# Patient Record
Sex: FEMALE | Race: WHITE | NOT HISPANIC OR LATINO | Employment: OTHER | URBAN - METROPOLITAN AREA
[De-identification: names, ages, dates, MRNs, and addresses within clinical notes are randomized per-mention and may not be internally consistent; named-entity substitution may affect disease eponyms.]

---

## 2018-09-04 ENCOUNTER — HOSPITAL ENCOUNTER (EMERGENCY)
Facility: CLINIC | Age: 73
Discharge: HOME | End: 2018-09-05
Attending: EMERGENCY MEDICINE | Admitting: EMERGENCY MEDICINE
Payer: MEDICARE

## 2018-09-04 VITALS
TEMPERATURE: 98.2 F | DIASTOLIC BLOOD PRESSURE: 79 MMHG | SYSTOLIC BLOOD PRESSURE: 139 MMHG | BODY MASS INDEX: 39.65 KG/M2 | RESPIRATION RATE: 16 BRPM | WEIGHT: 210 LBS | HEART RATE: 84 BPM | HEIGHT: 61 IN | OXYGEN SATURATION: 94 %

## 2018-09-04 DIAGNOSIS — M54.41 ACUTE RIGHT-SIDED LOW BACK PAIN WITH RIGHT-SIDED SCIATICA: ICD-10-CM

## 2018-09-04 DIAGNOSIS — M25.551 HIP PAIN, RIGHT: ICD-10-CM

## 2018-09-04 PROCEDURE — A9270 NON-COVERED ITEM OR SERVICE: HCPCS | Mod: GY | Performed by: EMERGENCY MEDICINE

## 2018-09-04 PROCEDURE — 99283 EMERGENCY DEPT VISIT LOW MDM: CPT

## 2018-09-04 PROCEDURE — 25000132 ZZH RX MED GY IP 250 OP 250 PS 637: Mod: GY | Performed by: EMERGENCY MEDICINE

## 2018-09-04 RX ORDER — TRAMADOL HYDROCHLORIDE 50 MG/1
50 TABLET ORAL ONCE
Status: COMPLETED | OUTPATIENT
Start: 2018-09-04 | End: 2018-09-04

## 2018-09-04 RX ORDER — ACETAMINOPHEN 500 MG
1000 TABLET ORAL ONCE
Status: COMPLETED | OUTPATIENT
Start: 2018-09-04 | End: 2018-09-04

## 2018-09-04 RX ORDER — OXYCODONE HYDROCHLORIDE 5 MG/1
5 TABLET ORAL ONCE
Status: COMPLETED | OUTPATIENT
Start: 2018-09-04 | End: 2018-09-04

## 2018-09-04 RX ADMIN — TRAMADOL HYDROCHLORIDE 50 MG: 50 TABLET, COATED ORAL at 22:09

## 2018-09-04 RX ADMIN — ACETAMINOPHEN 1000 MG: 500 TABLET, FILM COATED ORAL at 22:09

## 2018-09-04 RX ADMIN — OXYCODONE HYDROCHLORIDE 5 MG: 5 TABLET ORAL at 22:58

## 2018-09-04 NOTE — ED AVS SNAPSHOT
Emergency Department    [address] [place] [address]    Phone:  [phone]    Fax:  [phone]                                       Coretta Kolb   MRN: [id number]    Department:   Emergency Department   Date of Visit:  9/4/2018           After Visit Summary Signature Page     I have received my discharge instructions, and my questions have been answered. I have discussed any challenges I see with this plan with the nurse or doctor.    ..........................................................................................................................................  Patient/Patient Representative Signature      ..........................................................................................................................................  Patient Representative Print Name and Relationship to Patient    ..................................................               ................................................  Date                                            Time    ..........................................................................................................................................  Reviewed by Signature/Title    ...................................................              ..............................................  Date                                                            Time          22EPIC Rev 08/18

## 2018-09-04 NOTE — ED AVS SNAPSHOT
Emergency Department    [address] [facility] [address]    Phone:  [phone]    Fax:  [phone]                                       Coretta Kolb   MRN: [id number]    Department:   Emergency Department   Date of Visit:  9/4/2018           Patient Information     Date Of Birth          1945        Your diagnoses for this visit were:     Hip pain, right     Acute right-sided low back pain with right-sided sciatica        You were seen by Hannah Patel MD.      Follow-up Information     Schedule an appointment as soon as possible for a visit with Myra Tesfaye DO.    Specialty:  Family Practice    Contact information:    [facility]  [address]  [facility] [address]  [phone]          Discharge Instructions       Try heat or ice to your back, activity as tolerated.  Tylenol and/or Roxicodone as needed for pain.  MiraLAX and a stool softener.  See your doctor in the clinic in the next 24-48 hours.  You may need an MRI scan.  If you get worse and cannot get up out of the chair or bed due to severe pain, return to the ER.    Opioid Medication Information  You have been given a prescription for an opioid (narcotic) pain medicine and/or have received a pain medicine while here in the emergency department. These medicines can make you drowsy or impaired. You must not drive, operate dangerous equipment, or engage in any other dangerous activities while taking these medications. If you drive while taking these medications, you could be arrested for DUI, or driving under the influence. Do not drink any alcohol while you are taking these medications.   Opioid pain medications can cause addiction. If you have a history of chemical dependency of any type, you are at a higher risk of becoming addicted to pain medications. Only take these prescribed medications to treat your pain when all other options have been tried. Take it for as short a time and as  few doses as possible. Store your pain pills in a secure place, as they are frequently stolen and provide a dangerous opportunity for children or visitors in your house to start abusing these powerful medications. We will not replace any lost or stolen medicine. As soon as your pain is better, you should flush all your remaining medication.   Many prescription pain medications contain Tylenol (acetaminophen), including Vicodin, Tylenol #3, Norco, Lortab, and Percocet. You should not take any extra pills of Tylenol if you are using these prescription medications or you can get very sick. Do not ever take more than 4000 mg of acetaminophen in any 24 hour period.  All opioids tend to cause constipation. Drink plenty of water and eat foods that have a lot of fiber, such as fruits, vegetables, prune juice, apple juice and high fiber cereal. Take a laxative if you don t move your bowels at least every other day. Miralax, Milk of Magnesia, Colace, or Senna can be used to keep you regular.       Discharge References/Attachments     BACK PAIN (ACUTE OR CHRONIC) (ENGLISH)      24 Hour Appointment Hotline       To make an appointment at any [facility] clinic, call [phone] ([phone]). If you don't have a family doctor or clinic, we will help you find one. [facility] clinics are conveniently located to serve the needs of you and your family.             Review of your medicines      START taking        Dose / Directions Last dose taken    oxyCODONE IR 5 MG tablet   Commonly known as:  ROXICODONE   Dose:  5-10 mg   Quantity:  14 tablet        Take 1-2 tablets (5-10 mg) by mouth every 6 hours as needed for pain   Refills:  0          Our records show that you are taking the medicines listed below. If these are incorrect, please call your family doctor or clinic.        Dose / Directions Last dose taken    artificial tears Oint ophthalmic ointment   Dose:  2 inch   Quantity:  1 Tube        Place 2 g into the right eye At  Bedtime   Refills:  1        aspirin 81 MG tablet   Dose:  81 mg        Take 81 mg by mouth daily   Refills:  0        ATENOLOL PO        Refills:  0        HUMALOG KWIKPEN SC        Refills:  0        insulin glargine 100 UNIT/ML injection   Commonly known as:  LANTUS        Inject Subcutaneous At Bedtime   Refills:  0        LEVOTHYROXINE SODIUM PO        Refills:  0        LISINOPRIL PO        Refills:  0        valACYclovir 1000 mg tablet   Commonly known as:  VALTREX   Dose:  1000 mg   Quantity:  21 tablet        Take 1 tablet (1,000 mg) by mouth 3 times daily   Refills:  0                Information about OPIOIDS     PRESCRIPTION OPIOIDS: WHAT YOU NEED TO KNOW   We gave you an opioid (narcotic) pain medicine. It is important to manage your pain, but opioids are not always the best choice. You should first try all the other options your care team gave you. Take this medicine for as short a time (and as few doses) as possible.    Some activities can increase your pain, such as bandage changes or therapy sessions. It may help to take your pain medicine 30 to 60 minutes before these activities. Reduce your stress by getting enough sleep, working on hobbies you enjoy and practicing relaxation or meditation. Talk to your care team about ways to manage your pain beyond prescription opioids.    These medicines have risks:    DO NOT drive when on new or higher doses of pain medicine. These medicines can affect your alertness and reaction times, and you could be arrested for driving under the influence (DUI). If you need to use opioids long-term, talk to your care team about driving.    DO NOT operate heavy machinery    DO NOT do any other dangerous activities while taking these medicines.    DO NOT drink any alcohol while taking these medicines.     If the opioid prescribed includes acetaminophen, DO NOT take with any other medicines that contain acetaminophen. Read all labels carefully. Look for the word   acetaminophen  or  Tylenol.  Ask your pharmacist if you have questions or are unsure.    You can get addicted to pain medicines, especially if you have a history of addiction (chemical, alcohol or substance dependence). Talk to your care team about ways to reduce this risk.    All opioids tend to cause constipation. Drink plenty of water and eat foods that have a lot of fiber, such as fruits, vegetables, prune juice, apple juice and high-fiber cereal. Take a laxative (Miralax, milk of magnesia, Colace, Senna) if you don t move your bowels at least every other day. Other side effects include upset stomach, sleepiness, dizziness, throwing up, tolerance (needing more of the medicine to have the same effect), physical dependence and slowed breathing.    Store your pills in a secure place, locked if possible. We will not replace any lost or stolen medicine. If you don t finish your medicine, please throw away (dispose) as directed by your pharmacist. The [place] [employer] has more information about safe disposal: [email or web address]://[email or web address].[email or web address].mn.us/living-green/managing-unwanted-medications        Prescriptions were sent or printed at these locations (1 Prescription)                   Other Prescriptions                Printed at Department/Unit printer (1 of 1)         oxyCODONE IR (ROXICODONE) 5 MG tablet                Orders Needing Specimen Collection     None      Pending Results     No orders found for last 3 day(s).            Pending Culture Results     No orders found for last 3 day(s).            Pending Results Instructions     If you had any lab results that were not finalized at the time of your Discharge, you can call the ED Lab Result RN at [phone]. You will be contacted by this team for any positive Lab results or changes in treatment. The nurses are available 7 days a week from 10A to 6:30P.  You can leave a message 24 hours per day and they will return your call.        Test Results From  Your Hospital Stay               Clinical Quality Measure: Blood Pressure Screening     Your blood pressure was checked while you were in the emergency department today. The last reading we obtained was  BP: 139/79 . Please read the guidelines below about what these numbers mean and what you should do about them.  If your systolic blood pressure (the top number) is less than 120 and your diastolic blood pressure (the bottom number) is less than 80, then your blood pressure is normal. There is nothing more that you need to do about it.  If your systolic blood pressure (the top number) is 120-139 or your diastolic blood pressure (the bottom number) is 80-89, your blood pressure may be higher than it should be. You should have your blood pressure rechecked within a year by a primary care provider.  If your systolic blood pressure (the top number) is 140 or greater or your diastolic blood pressure (the bottom number) is 90 or greater, you may have high blood pressure. High blood pressure is treatable, but if left untreated over time it can put you at risk for heart attack, stroke, or kidney failure. You should have your blood pressure rechecked by a primary care provider within the next 4 weeks.  If your provider in the emergency department today gave you specific instructions to follow-up with your doctor or provider even sooner than that, you should follow that instruction and not wait for up to 4 weeks for your follow-up visit.        Thank you for choosing [facility]       Thank you for choosing [facility] for your care. Our goal is always to provide you with excellent care. Hearing back from our patients is one way we can continue to improve our services. Please take a few minutes to complete the written survey that you may receive in the mail after you visit with us. Thank you!        [employer] Information     [employer] lets you send messages to your doctor, view your test results, renew your prescriptions, schedule  "appointments and more. To sign up, go to [email or web address] . Click on \"Log in\" on the left side of the screen, which will take you to the Welcome page. Then click on \"Sign up Now\" on the right side of the page.     You will be asked to enter the access code listed below, as well as some personal information. Please follow the directions to create your username and password.     Your access code is: [email or web address]  Expires: 2018 12:35 AM     Your access code will  in 90 days. If you need help or a new code, please call your [facility] clinic or [phone].        Care EveryWhere ID     This is your Care EveryWhere ID. This could be used by other organizations to access your [facility] medical records  [id number]        Equal Access to Services     KETTY GABRIEL : Sienna Gamino, farshad sanabria, florencio valles, becca myers . So [facility] [phone].    ATENCIÓN: Si habla español, tiene a laura disposición servicios gratuitos de asistencia lingüística. Llame al [phone].    We comply with applicable federal civil rights laws and [place] laws. We do not discriminate on the basis of race, color, national origin, age, disability, sex, sexual orientation, or gender identity.            After Visit Summary       This is your record. Keep this with you and show to your community pharmacist(s) and doctor(s) at your next visit.                  "

## 2018-09-05 RX ORDER — OXYCODONE HYDROCHLORIDE 5 MG/1
5-10 TABLET ORAL EVERY 6 HOURS PRN
Qty: 14 TABLET | Refills: 0 | Status: SHIPPED | OUTPATIENT
Start: 2018-09-05 | End: 2023-01-09

## 2018-09-05 ASSESSMENT — ENCOUNTER SYMPTOMS
ARTHRALGIAS: 1
MYALGIAS: 1
BACK PAIN: 1

## 2018-09-05 NOTE — ED PROVIDER NOTES
"  History     Chief Complaint:  Back Pain      HPI   Coretta Kolb is a 72 year old female, with a history of diabetes, neuropathy, hypertension, and back surgery, who presents with back pain. Patient has bad some discomfort in her hip and thighs bilaterally since her surgery, however, three weeks ago it progressively worsened. She reports being able to do her chores around the house and walking up and down stairs, but the movement causes pain. She denies any throbbing pain at night. Patient has neuropathy so she notes numbness in her toes. Normal urination and bowel movements. Patient has iced the area, but has not taken any medications for her pain. She reports  Cortisone shots and physical therapy have not helped. Her surgeon is Dr. Gutierrez and her family physician is Dr. Myra Tesfaye.             Allergies:  Hydrocodone-acetaminophen      Medications:    Artifical tears  Aspirin  Atenolol  Insulin glargine  Insulin lispro  Levothyroxine  Lisinopril  Valtrex     Past Medical History:    Diabetes  Hypertension  Hypothyroid     Past Surgical History:    Orthopedic surgery     Family History:    The patient denies any relevant family medical history.    Social History:  The patient was accompanied to the ED by daughter.  Smoking Status: No  Smokeless Tobacco: Not on file  Alcohol Use: Yes  Marital Status:  Single [1]      Review of Systems   Musculoskeletal: Positive for arthralgias, back pain and myalgias.   All other systems reviewed and are negative.    Physical Exam   Vitals:  Patient Vitals for the past 24 hrs:   BP Temp Temp src Pulse Resp SpO2 Height Weight   09/04/18 2208 139/79 - - - - - - -   09/04/18 2052 182/83 98.2  F (36.8  C) Oral 84 16 94 % 1.556 m (5' 1.25\") 95.3 kg (210 lb)         Physical Exam  Nursing note and vitals reviewed.    Constitutional:  Appears well-developed and well-nourished, comfortable at rest but grimaces    with movement.    Cardiovascular:  Normal rate, regular " rhythm.     Peripheral pulse 2+ in right foot.  Cap refill less than 2 seconds.  Musculoskeletal:  Range of motion as directed with her back due to the pain on the right side. No    extremity deformity.     Tenderness over the right SI joint and lateral hip and thigh and over the proximal    iliotibial band.  Minimal midline lumbar tenderness.  Neurological:   Alert and oriented. Exhibits good muscle tone.  Normal ankle flexion and extension.     Sensation in the affected extremity normal.     GCS eye subscore is 4. GCS verbal subscore is 5.      GCS motor subscore is 6.   Skin:    Skin is warm and dry. No rash noted. No bruising.     No erythema. No pallor.  No lesions.   Psychiatric:   Behavior is normal. Appropriate mood and affect.     Judgment and thought content normal.   Emergency Department Course     Interventions:  2209 Tylenol 1000 mg Oral  2209 Tramadol 50 mg Oral  2258 Oxycodone IR 5 mg Oral       Emergency Department Course:  Nursing notes and vitals reviewed.  I performed an exam of the patient as documented above.     1722 check in with the patient.     I personally answered all related questions prior to discharge.    Findings and plan explained to the patient. Patient discharged home with instructions regarding supportive care, medications, and reasons to return. The importance of close follow-up was reviewed.     Impression & Plan      Medical Decision Making:  Patient comes in with right low back and hip pain. It feels just like her previous one where she required surgery of her lumbar spine due to disc. She has some SI joint tenderness but also lateral hip and thigh tenderness. I believe that she probably does have a disc causing some sciatica. She was given initially a tramadol which was not adequate for pain management and then a Roxicodone 5 mg tablet.  She was able to get up and move around but the getting up and down was what was painful.  She does not need imaging tonight, she does not  have a history of trauma. She may need an MRI to determine if this is another disc issue that may need surgery. She has had steroid injections and been on steroids in the past despite being a diabetic and they have not helped. I am going to send her home with a prescription for oxycodone with the precautions and she will follow-up with her doctor in the clinic in the next 1-2 days.  If she gets acutely worse where she cannot even get out of a chair, she can return to the ER.      Diagnosis:    ICD-10-CM    1. Hip pain, right M25.551    2. Acute right-sided low back pain with right-sided sciatica M54.41        Disposition:   Discharged    Discharge instructions:  Try heat or ice to your back, activity as tolerated. Tylenol and/or Roxicodone as needed for pain. MiraLAX and a stool softener. See your doctor in the clinic in the next 24-48 hours. You may need an MRI scan.  If you get worse and cannot get up out of the chair or bed due to severe pain, return to the ER.    CMS Diagnoses: None     Discharge Medications:  New Prescriptions    OXYCODONE IR (ROXICODONE) 5 MG TABLET    Take 1-2 tablets (5-10 mg) by mouth every 6 hours as needed for pain     Scribe Disclosure:  I, Karla Canseco, am serving as a scribe at 9:54 PM on 9/4/2018 to document services personally performed by Hannah Patel MD, based on my observations and the provider's statements to me.   EMERGENCY DEPARTMENT       Hannah Patel MD  09/05/18 0109

## 2018-09-05 NOTE — ED NOTES
Road test: Pt successfully ambulated without assistance, however 10/10 pain when going to a sitting position. MD notified.

## 2018-09-05 NOTE — DISCHARGE INSTRUCTIONS
Try heat or ice to your back, activity as tolerated.  Tylenol and/or Roxicodone as needed for pain.  MiraLAX and a stool softener.  See your doctor in the clinic in the next 24-48 hours.  You may need an MRI scan.  If you get worse and cannot get up out of the chair or bed due to severe pain, return to the ER.    Opioid Medication Information  You have been given a prescription for an opioid (narcotic) pain medicine and/or have received a pain medicine while here in the emergency department. These medicines can make you drowsy or impaired. You must not drive, operate dangerous equipment, or engage in any other dangerous activities while taking these medications. If you drive while taking these medications, you could be arrested for DUI, or driving under the influence. Do not drink any alcohol while you are taking these medications.   Opioid pain medications can cause addiction. If you have a history of chemical dependency of any type, you are at a higher risk of becoming addicted to pain medications. Only take these prescribed medications to treat your pain when all other options have been tried. Take it for as short a time and as few doses as possible. Store your pain pills in a secure place, as they are frequently stolen and provide a dangerous opportunity for children or visitors in your house to start abusing these powerful medications. We will not replace any lost or stolen medicine. As soon as your pain is better, you should flush all your remaining medication.   Many prescription pain medications contain Tylenol (acetaminophen), including Vicodin, Tylenol #3, Norco, Lortab, and Percocet. You should not take any extra pills of Tylenol if you are using these prescription medications or you can get very sick. Do not ever take more than 4000 mg of acetaminophen in any 24 hour period.  All opioids tend to cause constipation. Drink plenty of water and eat foods that have a lot of fiber, such as fruits, vegetables,  prune juice, apple juice and high fiber cereal. Take a laxative if you don t move your bowels at least every other day. Miralax, Milk of Magnesia, Colace, or Senna can be used to keep you regular.

## 2019-05-09 ENCOUNTER — HOSPITAL ENCOUNTER (EMERGENCY)
Facility: CLINIC | Age: 74
Discharge: HOME | End: 2019-05-09
Attending: EMERGENCY MEDICINE | Admitting: EMERGENCY MEDICINE
Payer: MEDICARE

## 2019-05-09 VITALS
SYSTOLIC BLOOD PRESSURE: 172 MMHG | OXYGEN SATURATION: 96 % | DIASTOLIC BLOOD PRESSURE: 72 MMHG | RESPIRATION RATE: 16 BRPM | WEIGHT: 215 LBS | HEART RATE: 84 BPM | TEMPERATURE: 98.7 F | BODY MASS INDEX: 40.59 KG/M2 | HEIGHT: 61 IN

## 2019-05-09 DIAGNOSIS — M79.651 PAIN OF RIGHT THIGH: ICD-10-CM

## 2019-05-09 PROCEDURE — 99283 EMERGENCY DEPT VISIT LOW MDM: CPT

## 2019-05-09 PROCEDURE — A9270 NON-COVERED ITEM OR SERVICE: HCPCS | Performed by: EMERGENCY MEDICINE

## 2019-05-09 PROCEDURE — 25000132 ZZH RX MED GY IP 250 OP 250 PS 637: Performed by: EMERGENCY MEDICINE

## 2019-05-09 RX ORDER — OXYCODONE AND ACETAMINOPHEN 5; 325 MG/1; MG/1
1 TABLET ORAL ONCE
Status: COMPLETED | OUTPATIENT
Start: 2019-05-09 | End: 2019-05-09

## 2019-05-09 RX ORDER — LIDOCAINE 4 G/G
2 PATCH TOPICAL
Status: DISCONTINUED | OUTPATIENT
Start: 2019-05-09 | End: 2019-05-10 | Stop reason: HOSPADM

## 2019-05-09 RX ORDER — LIDOCAINE 50 MG/G
1 PATCH TOPICAL EVERY 24 HOURS
Qty: 10 PATCH | Refills: 0 | Status: SHIPPED | OUTPATIENT
Start: 2019-05-09 | End: 2019-05-19

## 2019-05-09 RX ADMIN — OXYCODONE HYDROCHLORIDE AND ACETAMINOPHEN 1 TABLET: 5; 325 TABLET ORAL at 22:53

## 2019-05-09 RX ADMIN — LIDOCAINE 2 PATCH: 560 PATCH PERCUTANEOUS; TOPICAL; TRANSDERMAL at 22:53

## 2019-05-09 ASSESSMENT — ENCOUNTER SYMPTOMS
CHILLS: 0
NUMBNESS: 0
WOUND: 0
ARTHRALGIAS: 0
COLOR CHANGE: 0
WEAKNESS: 0
FEVER: 0

## 2019-05-09 ASSESSMENT — MIFFLIN-ST. JEOR: SCORE: 1427.61

## 2019-05-09 NOTE — ED AVS SNAPSHOT
Emergency Department  [address] [place] [address]  Phone:  [phone]  Fax:  [phone]                                    Coretta Kolb   MRN: [id number]    Department:   Emergency Department   Date of Visit:  5/9/2019           After Visit Summary Signature Page    I have received my discharge instructions, and my questions have been answered. I have discussed any challenges I see with this plan with the nurse or doctor.    ..........................................................................................................................................  Patient/Patient Representative Signature      ..........................................................................................................................................  Patient Representative Print Name and Relationship to Patient    ..................................................               ................................................  Date                                   Time    ..........................................................................................................................................  Reviewed by Signature/Title    ...................................................              ..............................................  Date                                               Time          22EPIC Rev 08/18

## 2019-05-10 NOTE — ED PROVIDER NOTES
History     Chief Complaint:  Leg Pain    HPI   Coretta Kolb is a 73 year old female with a history of insulin-dependent diabetes with diabetic neuropathy as well as right leg bursitis diagnosed in September 2018 who presents with right leg pain. Since September 2018, the patient reports she has been dealing with some right upper leg pain secondary to bursitis for which she received injection with good results in December 2018 and follows with Dr. Cardoso at [facility]. She had a second injection a few weeks ago without any relief and has been attending physical therapy since then. This morning, the patient reports she attended physical therapy and noticed the pain in her right leg seemed particularly bad afterwards, preventing her from walking without assistance, so she had her daughter call [facility] who recommended she come to the ED for further evaluation. Here, the patient states her pain is not different than normal, but rather she is just sick of it and has had enough. She notes she has been intermittently using ice at home, but otherwise does not take any over-the-counter pain medications at home. She describes the pain as a tightness. She denies any numbness, tingling, weakness, recent trauma, knee or joint pain, redness/warmth, fevers, chills, leg swelling, or other acute symptoms or changes.    Allergies:  Hydrocodone-acetaminophen  Metformin  Pioglitazone    Medications:    Meloxicam  Amlodipine  Crestor  Lisinopril  Levothyroxine  Cymbalta  Gabapentin  Novolin    Past Medical History:    Corneal dystrophy  Hypertension  Sensorineural hearing loss  Dyslipidemia  Type II diabetes mellitus  Painful diabetic neuropathy  Hypothyroidism  Plantar fascial fibromatosis    Past Surgical History:    Right foot surgery x4  Left knee surgery  Ligation fallopian tube  Left foot surgery  Dilation and curettage  Herniated disc/spinal stenosis surgery    Family History:    Cataracts    Social History:  Smoking status:  "No  Alcohol use: Yes, occasionally  Drug use: No  PCP: Myra Tesfaye DO  Presents to the ED with her daughter  Marital Status: Single [1]     Review of Systems   Constitutional: Negative for chills and fever.   Cardiovascular: Negative for leg swelling.   Musculoskeletal: Negative for arthralgias.        Right lower extremity pain   Skin: Negative for color change and wound.   Neurological: Negative for weakness and numbness.   10 point review of systems performed and is negative except as above and in HPI.    Physical Exam     Patient Vitals for the past 24 hrs:   BP Temp Temp src Pulse Resp SpO2 Height Weight   05/09/19 2033 172/72 98.7  F (37.1  C) Oral 84 16 96 % 1.549 m (5' 1\") 97.5 kg (215 lb)     Physical Exam  General: No distress.   Head: No signs of trauma.   Mouth/Throat: Oropharynx moist.   Eyes: Conjunctivae are normal. Pupils are equal..   Neck: Normal range of motion.    Resp:No respiratory distress.   MSK: Normal range of motion. No obvious deformity. Right hip is non-tender. Right lateral thigh is mildly tender to palpation. No saddle paresthesias. No tenderness in the posterior leg. No tenderness to the calf. No swelling. She is able to bear weight on the right lower extremity.  Neuro: The patient is alert and interactive. TEJEDA. Speech normal. GCS 15  Skin: No lesion or sign of trauma noted.   Psych: normal mood and affect. behavior is normal.     Emergency Department Course   Interventions:  2253: 4% Lidocaine patches applied to patient's right leg  2253: 1 tablet Percocet PO    Emergency Department Course:  Past medical records, nursing notes, and vitals reviewed.  2212: I performed an exam of the patient and obtained history, as documented above.    2303: I rechecked the patient. Findings and plan explained to the patient. Patient discharged home with instructions regarding supportive care, medications, and reasons to return. The importance of close follow-up was reviewed.     Impression & Plan "    Medical Decision Making:  Coretta Kolb is a 73 year old female who presents for evaluation of chronic pain to her right thigh, previously diagnosed as bursitis. The patient follows with orthopedic surgery and is currently undergoing physical therapy. She attended PT this morning and thinks that may have exacerbated her pain, prompting her ED visit this evening. Here, she describes pain consistent with her chronic pain of this area. She has never tried a Lidocaine patch in the past, so we applied these in the ED. I also gave her one dose of Percocet to help alleviate her pain temporarily. At this time, there are no signs of complication of the long-standing bursitis including recent fevers, color changes, numbness, or weakness. There is no indication for imaging as the patient has undergone extensive imaging in the past. The patient will continue to follow-up with orthopedics. In the meantime, I will write her a prescription for Lidocaine patches. She was understanding and agreeable to the plan for discharge home. She was discharged home in stable, improved condition.    Diagnosis:    ICD-10-CM   1. Pain of right thigh M79.651     Disposition: Discharged to home    Discharge Medications:  lidocaine 5 % patch  Commonly known as:  LIDODERM  1 patch, Transdermal, EVERY 24 HOURS       Dorothy Donovan  5/9/2019    EMERGENCY DEPARTMENT    I, Dorothy Donovan, am serving as a scribe at 10:12 PM on 5/9/2019 to document services personally performed by Ines Gordon MD based on my observations and the provider's statements to me.      Ines Gordon MD  05/11/19 0151

## 2019-05-10 NOTE — ED TRIAGE NOTES
Rt leg pain since last fall. Has seen an MD at [facility], has been getting PT, had PT today. Rt side of leg has increased in pain today.

## 2022-11-30 ENCOUNTER — HOSPITAL ENCOUNTER (EMERGENCY)
Facility: CLINIC | Age: 77
Discharge: HOME | End: 2022-11-30
Attending: EMERGENCY MEDICINE | Admitting: EMERGENCY MEDICINE
Payer: MEDICARE

## 2022-11-30 VITALS
HEIGHT: 61 IN | SYSTOLIC BLOOD PRESSURE: 153 MMHG | OXYGEN SATURATION: 96 % | HEART RATE: 77 BPM | TEMPERATURE: 98.1 F | DIASTOLIC BLOOD PRESSURE: 88 MMHG | RESPIRATION RATE: 18 BRPM | BODY MASS INDEX: 41.54 KG/M2 | WEIGHT: 220 LBS

## 2022-11-30 DIAGNOSIS — R73.9 ELEVATED BLOOD SUGAR: ICD-10-CM

## 2022-11-30 LAB
ALBUMIN SERPL-MCNC: 3 G/DL (ref 3.4–5)
ALBUMIN UR-MCNC: 100 MG/DL
ALP SERPL-CCNC: 114 U/L (ref 40–150)
ALT SERPL W P-5'-P-CCNC: 27 U/L (ref 0–50)
ANION GAP SERPL CALCULATED.3IONS-SCNC: 9 MMOL/L (ref 3–14)
APPEARANCE UR: CLEAR
AST SERPL W P-5'-P-CCNC: 35 U/L (ref 0–45)
ATRIAL RATE - MUSE: NORMAL BPM
BASE EXCESS BLDV CALC-SCNC: 2.7 MMOL/L (ref -7.7–1.9)
BASOPHILS # BLD AUTO: 0.1 10E3/UL (ref 0–0.2)
BASOPHILS NFR BLD AUTO: 1 %
BILIRUB SERPL-MCNC: 0.5 MG/DL (ref 0.2–1.3)
BILIRUB UR QL STRIP: NEGATIVE
BUN SERPL-MCNC: 18 MG/DL (ref 7–30)
CALCIUM SERPL-MCNC: 9.8 MG/DL (ref 8.5–10.1)
CHLORIDE BLD-SCNC: 103 MMOL/L (ref 94–109)
CO2 SERPL-SCNC: 23 MMOL/L (ref 20–32)
COLOR UR AUTO: ABNORMAL
CREAT SERPL-MCNC: 0.67 MG/DL (ref 0.52–1.04)
DIASTOLIC BLOOD PRESSURE - MUSE: NORMAL MMHG
EOSINOPHIL # BLD AUTO: 0.5 10E3/UL (ref 0–0.7)
EOSINOPHIL NFR BLD AUTO: 7 %
ERYTHROCYTE [DISTWIDTH] IN BLOOD BY AUTOMATED COUNT: 13.5 % (ref 10–15)
GFR SERPL CREATININE-BSD FRML MDRD: >90 ML/MIN/1.73M2
GLUCOSE BLD-MCNC: 432 MG/DL (ref 70–99)
GLUCOSE BLDC GLUCOMTR-MCNC: 382 MG/DL (ref 70–99)
GLUCOSE UR STRIP-MCNC: >=1000 MG/DL
HBA1C MFR BLD: 9.8 % (ref 0–5.6)
HCO3 BLDV-SCNC: 27 MMOL/L (ref 21–28)
HCT VFR BLD AUTO: 46.3 % (ref 35–47)
HGB BLD-MCNC: 15.2 G/DL (ref 11.7–15.7)
HGB UR QL STRIP: ABNORMAL
IMM GRANULOCYTES # BLD: 0 10E3/UL
IMM GRANULOCYTES NFR BLD: 0 %
INTERPRETATION ECG - MUSE: NORMAL
KETONES BLD-SCNC: 0.3 MMOL/L (ref 0–0.6)
KETONES UR STRIP-MCNC: NEGATIVE MG/DL
LEUKOCYTE ESTERASE UR QL STRIP: NEGATIVE
LYMPHOCYTES # BLD AUTO: 1.8 10E3/UL (ref 0.8–5.3)
LYMPHOCYTES NFR BLD AUTO: 22 %
MCH RBC QN AUTO: 29.3 PG (ref 26.5–33)
MCHC RBC AUTO-ENTMCNC: 32.8 G/DL (ref 31.5–36.5)
MCV RBC AUTO: 89 FL (ref 78–100)
MONOCYTES # BLD AUTO: 0.7 10E3/UL (ref 0–1.3)
MONOCYTES NFR BLD AUTO: 8 %
MUCOUS THREADS #/AREA URNS LPF: PRESENT /LPF
NEUTROPHILS # BLD AUTO: 5.2 10E3/UL (ref 1.6–8.3)
NEUTROPHILS NFR BLD AUTO: 62 %
NITRATE UR QL: NEGATIVE
NRBC # BLD AUTO: 0 10E3/UL
NRBC BLD AUTO-RTO: 0 /100
O2/TOTAL GAS SETTING VFR VENT: 0 %
OXYHGB MFR BLDV: 84 % (ref 70–75)
P AXIS - MUSE: NORMAL DEGREES
PCO2 BLDV: 41 MM HG (ref 40–50)
PH BLDV: 7.43 [PH] (ref 7.32–7.43)
PH UR STRIP: 6 [PH] (ref 5–7)
PLAT MORPH BLD: NORMAL
PLATELET # BLD AUTO: 222 10E3/UL (ref 150–450)
PO2 BLDV: 48 MM HG (ref 25–47)
POTASSIUM BLD-SCNC: 4.6 MMOL/L (ref 3.4–5.3)
PR INTERVAL - MUSE: NORMAL MS
PROT SERPL-MCNC: 7.5 G/DL (ref 6.8–8.8)
QRS DURATION - MUSE: 86 MS
QT - MUSE: 380 MS
QTC - MUSE: 416 MS
R AXIS - MUSE: -24 DEGREES
RBC # BLD AUTO: 5.19 10E6/UL (ref 3.8–5.2)
RBC MORPH BLD: NORMAL
RBC URINE: 2 /HPF
SODIUM SERPL-SCNC: 135 MMOL/L (ref 133–144)
SP GR UR STRIP: 1.03 (ref 1–1.03)
SQUAMOUS EPITHELIAL: <1 /HPF
SYSTOLIC BLOOD PRESSURE - MUSE: NORMAL MMHG
T AXIS - MUSE: 50 DEGREES
TROPONIN I SERPL HS-MCNC: 9 NG/L
UROBILINOGEN UR STRIP-MCNC: NORMAL MG/DL
VENTRICULAR RATE- MUSE: 72 BPM
WBC # BLD AUTO: 8.3 10E3/UL (ref 4–11)
WBC URINE: 1 /HPF

## 2022-11-30 PROCEDURE — 96360 HYDRATION IV INFUSION INIT: CPT

## 2022-11-30 PROCEDURE — 81001 URINALYSIS AUTO W/SCOPE: CPT | Performed by: EMERGENCY MEDICINE

## 2022-11-30 PROCEDURE — 99284 EMERGENCY DEPT VISIT MOD MDM: CPT | Mod: 25

## 2022-11-30 PROCEDURE — 84484 ASSAY OF TROPONIN QUANT: CPT | Performed by: EMERGENCY MEDICINE

## 2022-11-30 PROCEDURE — 82374 ASSAY BLOOD CARBON DIOXIDE: CPT | Performed by: EMERGENCY MEDICINE

## 2022-11-30 PROCEDURE — 82435 ASSAY OF BLOOD CHLORIDE: CPT | Performed by: EMERGENCY MEDICINE

## 2022-11-30 PROCEDURE — 85025 COMPLETE CBC W/AUTO DIFF WBC: CPT | Performed by: EMERGENCY MEDICINE

## 2022-11-30 PROCEDURE — 36415 COLL VENOUS BLD VENIPUNCTURE: CPT | Performed by: EMERGENCY MEDICINE

## 2022-11-30 PROCEDURE — 83036 HEMOGLOBIN GLYCOSYLATED A1C: CPT | Performed by: EMERGENCY MEDICINE

## 2022-11-30 PROCEDURE — 82805 BLOOD GASES W/O2 SATURATION: CPT | Performed by: EMERGENCY MEDICINE

## 2022-11-30 PROCEDURE — 258N000003 HC RX IP 258 OP 636: Performed by: EMERGENCY MEDICINE

## 2022-11-30 PROCEDURE — 93005 ELECTROCARDIOGRAM TRACING: CPT

## 2022-11-30 PROCEDURE — 82010 KETONE BODYS QUAN: CPT | Performed by: EMERGENCY MEDICINE

## 2022-11-30 RX ADMIN — SODIUM CHLORIDE 1000 ML: 9 INJECTION, SOLUTION INTRAVENOUS at 10:13

## 2022-11-30 ASSESSMENT — ACTIVITIES OF DAILY LIVING (ADL): ADLS_ACUITY_SCORE: 35

## 2022-11-30 ASSESSMENT — ENCOUNTER SYMPTOMS
DYSURIA: 0
ABDOMINAL PAIN: 0
HEMATURIA: 0

## 2022-11-30 NOTE — ED PROVIDER NOTES
"  History     Chief Complaint:  Hyperglycemia      HPI   Coretta Kolb is a 77 year old female who has a history of DM type II and presents with hyperglycemia. Patient was supposed to have cataract surgery but was instead sent to the ED for high blood sugar. She states that she has not taken her lantus or humalog since Sunday, and when asked why she states that it \"was a mistake.\" She has enough medication at home. She denies chest pain, abdominal pain, or urinary problems. Her second cataract surgery is scheduled in December.    Review of Systems   Cardiovascular: Negative for chest pain.   Gastrointestinal: Negative for abdominal pain.   Genitourinary: Negative for decreased urine volume, dysuria, hematuria and urgency.   All other systems reviewed and are negative.    Allergies:  Hydrocodone-Acetaminophen      Medications:    aspirin   atenolol  insulin glargine  Insulin Lispro  levothyroxine  lisinopril  oxycodone IR  valacyclovir     Past Medical History:    Diabetes mellitus type II  Hypertension  Hypothyroidism  Basal cell carcinoma  Cataracts  Osteoarthritis  Lichen sclerosus  JESUS    Past Surgical History:    Orthopedic surgery    Social History:  Presents with daughter  Physical Exam     Patient Vitals for the past 24 hrs:   BP Temp Temp src Pulse Resp SpO2 Height Weight   11/30/22 0945 (!) 153/88 -- -- -- -- -- -- --   11/30/22 0914 (!) 209/96 98.1  F (36.7  C) Temporal 77 18 96 % 1.549 m (5' 1\") 99.8 kg (220 lb)       Physical Exam  Vitals: reviewed by me  General: Pt seen on \[facility] [place] [facility]\"", [facility], cooperative, and alert to conversation  Eyes: Tracking well, clear conjunctiva BL  ENT: MMM, midline trachea.   Lungs: No tachypnea, no accessory muscle use. No respiratory distress.   CV: Rate as above  Abd: Soft, non tender, no guarding, no rebound. Non distended  MSK: no joint effusion.  No evidence of trauma  Skin: No rash  Neuro: Clear speech and no facial droop.  Psych: Not RIS, no e/o " AH/VH      Emergency Department Course   ECG:  ECG taken at 0942  ECG results from 11/30/22   EKG 12-lead, tracing only     Value    Systolic Blood Pressure     Diastolic Blood Pressure     Ventricular Rate 72    Atrial Rate     RI Interval     QRS Duration 86        QTc 416    P Axis     R AXIS -24    T Axis 50    Interpretation ECG      Accelerated Junctional rhythm  Low voltage QRS  Cannot rule out Anterior infarct , age undetermined  Abnormal ECG  When compared with ECG of 27-OCT-2014 22:10,  Junctional rhythm has replaced Sinus rhythm  Minimal criteria for Anterior infarct are now Present  ST now depressed in Lateral leads       Laboratory:  Labs Ordered and Resulted from Time of ED Arrival to Time of ED Departure   COMPREHENSIVE METABOLIC PANEL - Abnormal       Result Value    Sodium 135      Potassium 4.6      Chloride 103      Carbon Dioxide (CO2) 23      Anion Gap 9      Urea Nitrogen 18      Creatinine 0.67      Calcium 9.8      Glucose 432 (*)     Alkaline Phosphatase 114      AST 35      ALT 27      Protein Total 7.5      Albumin 3.0 (*)     Bilirubin Total 0.5      GFR Estimate >90     BLOOD GAS VENOUS WITH OXYHEMOGLOBIN - Abnormal    pH Venous 7.43      pCO2 Venous 41      pO2 Venous 48 (*)     Bicarbonate Venous 27      FIO2 0      Oxyhemoglobin Venous 84 (*)     Base Excess/Deficit (+/-) 2.7 (*)    ROUTINE UA WITH MICROSCOPIC REFLEX TO CULTURE - Abnormal    Color Urine Light Yellow      Appearance Urine Clear      Glucose Urine >=1000 (*)     Bilirubin Urine Negative      Ketones Urine Negative      Specific Gravity Urine 1.030      Blood Urine Trace (*)     pH Urine 6.0      Protein Albumin Urine 100 (*)     Urobilinogen Urine Normal      Nitrite Urine Negative      Leukocyte Esterase Urine Negative      Mucus Urine Present (*)     RBC Urine 2      WBC Urine 1      Squamous Epithelials Urine <1     HEMOGLOBIN A1C - Abnormal    Hemoglobin A1C 9.8 (*)    KETONE BETA-HYDROXYBUTYRATE QUANTITATIVE,  RAPID - Normal    Ketone (Beta-Hydroxybutyrate) Quantitative 0.3     TROPONIN I - Normal    Troponin I High Sensitivity 9     CBC WITH PLATELETS AND DIFFERENTIAL    WBC Count 8.3      RBC Count 5.19      Hemoglobin 15.2      Hematocrit 46.3      MCV 89      MCH 29.3      MCHC 32.8      RDW 13.5      Platelet Count 222      % Neutrophils 62      % Lymphocytes 22      % Monocytes 8      % Eosinophils 7      % Basophils 1      % Immature Granulocytes 0      NRBCs per 100 WBC 0      Absolute Neutrophils 5.2      Absolute Lymphocytes 1.8      Absolute Monocytes 0.7      Absolute Eosinophils 0.5      Absolute Basophils 0.1      Absolute Immature Granulocytes 0.0      Absolute NRBCs 0.0     RBC AND PLATELET MORPHOLOGY    Platelet Assessment        Value: Automated Count Confirmed. Platelet morphology is normal.    RBC Morphology Confirmed RBC Indices       Emergency Department Course:    Reviewed:  I reviewed nursing notes, vitals, past medical history and Care Everywhere    Assessments:  0920 I obtained history and examined the patient as noted above.   1120 I rechecked the patient and explained findings.     Interventions:  1013 NS 1000 mL IV    Disposition:  The patient was discharged to home.     Impression & Plan      Medical Decision Making:  This is a very pleasant 77-year-old female who presents to the emergency room with what appears to be elevated blood glucose found incidentally today.  She confirms that she has no symptoms.  She tells me she has a little thirsty however, this may be because she was n.p.o. for her procedure, but also because of dehydration associated with her hyperglycemia.  She seems quite forthright that she simply has forgotten to take her medications for the last 2 or 3 days, and tells me that she feels confident she will be able to start taking it again tonight.  Have asked her to follow with her regular doctor in the next 48 hours at least by phone to let her team know what is happening,  and have asked the patient to be certain to take all her medications starting  when she gets home.  The patient has reassuring labs here, no evidence of acidosis, benign abdominal exam, no evidence of ACS or other cause of hyperglycemia, and we have a clear and compelling alternative reason for why her sugars would be high.  She maintains that apart from her thirst, she again is asymptomatic, and tells me she feels comfortable going home.  She received IV fluids here, just, she feels improved again regarding her thirst and that she will come back if anything changes.  Daughter is at the bedside as well, they are both quite reliable appearing and I do think that they will come back if anything changes      Diagnosis:    ICD-10-CM    1. Elevated blood sugar  R73.9         Scribe Disclosure:  I, Kady Donnelly, am serving as a scribe at 9:51 AM on 11/30/2022 to document services personally performed by Gordon Hu MD based on my observations and the provider's statements to me.      Gordon Hu MD  11/30/22 6536

## 2022-11-30 NOTE — DISCHARGE INSTRUCTIONS
As we discussed, your blood sugar is likely high because you have not taken her medication for several days.  Please take your medication today as directed and be certain that you come back to the ER immediately with any new symptoms like vomiting, abdominal pain, or if you feel too dehydrated.  Please be certain to drink plenty of water at home, more than you normally what is you are somewhat dehydrated here, although we did give you a liter of fluids.  Please also touch base with your primary care doctor in the next 48 to 72 hours at least by phone to let them know what happened and if see if they want to make any changes to your insulin regimen.  Come back with any other concerns you have.  Do be certain to follow with your regular doctor in the next 1 week as we discussed in person as well.

## 2022-11-30 NOTE — ED TRIAGE NOTES
Scheduled for cataract surgery this AM at eye clinic in [place].   and wasn't able to do surgery and sent here.  States no symptoms. Has been 2 days since checking BS numbers.  Type II DM.     Triage Assessment     Row Name 11/30/22 0908       Triage Assessment (Adult)    Airway WDL WDL       Respiratory WDL    Respiratory WDL WDL       Skin Circulation/Temperature WDL    Skin Circulation/Temperature WDL WDL       Cardiac WDL    Cardiac WDL WDL       Peripheral/Neurovascular WDL    Peripheral Neurovascular WDL WDL       Cognitive/Neuro/Behavioral WDL    Cognitive/Neuro/Behavioral WDL WDL

## 2022-11-30 NOTE — ED NOTES
"Patient reports she hasn't checked her BG or taken her medicine for 2 days. I asked patent if something happened that she was unable to and she said, \" I am just lazy and didn't want to.\" Patient has no symptoms, feels well.  "

## 2023-01-09 ENCOUNTER — APPOINTMENT (OUTPATIENT)
Dept: MRI IMAGING | Facility: CLINIC | Age: 78
End: 2023-01-09
Attending: EMERGENCY MEDICINE
Payer: MEDICARE

## 2023-01-09 ENCOUNTER — HOSPITAL ENCOUNTER (OUTPATIENT)
Facility: CLINIC | Age: 78
Setting detail: OBSERVATION
Discharge: NURSING, REHAB OR LONG-TERM CARE | End: 2023-01-14
Attending: EMERGENCY MEDICINE | Admitting: INTERNAL MEDICINE
Payer: MEDICARE

## 2023-01-09 DIAGNOSIS — M54.16 LUMBAR BACK PAIN WITH RADICULOPATHY AFFECTING LEFT LOWER EXTREMITY: ICD-10-CM

## 2023-01-09 DIAGNOSIS — E11.42 TYPE 2 DIABETES MELLITUS WITH DIABETIC POLYNEUROPATHY, WITH LONG-TERM CURRENT USE OF INSULIN (H): Primary | ICD-10-CM

## 2023-01-09 DIAGNOSIS — R52 INTRACTABLE PAIN: ICD-10-CM

## 2023-01-09 DIAGNOSIS — Z79.4 TYPE 2 DIABETES MELLITUS WITH DIABETIC POLYNEUROPATHY, WITH LONG-TERM CURRENT USE OF INSULIN (H): Primary | ICD-10-CM

## 2023-01-09 LAB
ALBUMIN UR-MCNC: 300 MG/DL
AMORPH CRY #/AREA URNS HPF: ABNORMAL /HPF
ANION GAP SERPL CALCULATED.3IONS-SCNC: 8 MMOL/L (ref 3–14)
APPEARANCE UR: ABNORMAL
BACTERIA #/AREA URNS HPF: ABNORMAL /HPF
BASOPHILS # BLD AUTO: 0.1 10E3/UL (ref 0–0.2)
BASOPHILS NFR BLD AUTO: 1 %
BILIRUB UR QL STRIP: NEGATIVE
BUN SERPL-MCNC: 22 MG/DL (ref 7–30)
CALCIUM SERPL-MCNC: 9.6 MG/DL (ref 8.5–10.1)
CHLORIDE BLD-SCNC: 103 MMOL/L (ref 94–109)
CO2 SERPL-SCNC: 23 MMOL/L (ref 20–32)
COLOR UR AUTO: ABNORMAL
CREAT SERPL-MCNC: 0.62 MG/DL (ref 0.52–1.04)
EOSINOPHIL # BLD AUTO: 0.4 10E3/UL (ref 0–0.7)
EOSINOPHIL NFR BLD AUTO: 3 %
ERYTHROCYTE [DISTWIDTH] IN BLOOD BY AUTOMATED COUNT: 13.3 % (ref 10–15)
GFR SERPL CREATININE-BSD FRML MDRD: >90 ML/MIN/1.73M2
GLUCOSE BLD-MCNC: 273 MG/DL (ref 70–99)
GLUCOSE BLDC GLUCOMTR-MCNC: 174 MG/DL (ref 70–99)
GLUCOSE BLDC GLUCOMTR-MCNC: 206 MG/DL (ref 70–99)
GLUCOSE UR STRIP-MCNC: 150 MG/DL
HCT VFR BLD AUTO: 42.8 % (ref 35–47)
HGB BLD-MCNC: 14.3 G/DL (ref 11.7–15.7)
HGB UR QL STRIP: NEGATIVE
HOLD SPECIMEN: NORMAL
HOLD SPECIMEN: NORMAL
HYALINE CASTS: 5 /LPF
IMM GRANULOCYTES # BLD: 0.1 10E3/UL
IMM GRANULOCYTES NFR BLD: 1 %
KETONES UR STRIP-MCNC: NEGATIVE MG/DL
LEUKOCYTE ESTERASE UR QL STRIP: ABNORMAL
LYMPHOCYTES # BLD AUTO: 1.8 10E3/UL (ref 0.8–5.3)
LYMPHOCYTES NFR BLD AUTO: 14 %
MCH RBC QN AUTO: 29.7 PG (ref 26.5–33)
MCHC RBC AUTO-ENTMCNC: 33.4 G/DL (ref 31.5–36.5)
MCV RBC AUTO: 89 FL (ref 78–100)
MONOCYTES # BLD AUTO: 0.5 10E3/UL (ref 0–1.3)
MONOCYTES NFR BLD AUTO: 4 %
MUCOUS THREADS #/AREA URNS LPF: PRESENT /LPF
NEUTROPHILS # BLD AUTO: 10 10E3/UL (ref 1.6–8.3)
NEUTROPHILS NFR BLD AUTO: 77 %
NITRATE UR QL: NEGATIVE
NRBC # BLD AUTO: 0 10E3/UL
NRBC BLD AUTO-RTO: 0 /100
PH UR STRIP: 6 [PH] (ref 5–7)
PLATELET # BLD AUTO: 290 10E3/UL (ref 150–450)
POTASSIUM BLD-SCNC: 4.1 MMOL/L (ref 3.4–5.3)
RBC # BLD AUTO: 4.82 10E6/UL (ref 3.8–5.2)
RBC URINE: 2 /HPF
SODIUM SERPL-SCNC: 134 MMOL/L (ref 133–144)
SP GR UR STRIP: 1.02 (ref 1–1.03)
SQUAMOUS EPITHELIAL: 2 /HPF
UROBILINOGEN UR STRIP-MCNC: NORMAL MG/DL
WBC # BLD AUTO: 12.8 10E3/UL (ref 4–11)
WBC URINE: 53 /HPF

## 2023-01-09 PROCEDURE — 80048 BASIC METABOLIC PNL TOTAL CA: CPT | Performed by: EMERGENCY MEDICINE

## 2023-01-09 PROCEDURE — 87086 URINE CULTURE/COLONY COUNT: CPT | Performed by: EMERGENCY MEDICINE

## 2023-01-09 PROCEDURE — G0378 HOSPITAL OBSERVATION PER HR: HCPCS

## 2023-01-09 PROCEDURE — 99223 1ST HOSP IP/OBS HIGH 75: CPT | Mod: AI | Performed by: INTERNAL MEDICINE

## 2023-01-09 PROCEDURE — 250N000011 HC RX IP 250 OP 636: Performed by: EMERGENCY MEDICINE

## 2023-01-09 PROCEDURE — 96376 TX/PRO/DX INJ SAME DRUG ADON: CPT

## 2023-01-09 PROCEDURE — 85004 AUTOMATED DIFF WBC COUNT: CPT | Performed by: EMERGENCY MEDICINE

## 2023-01-09 PROCEDURE — 82962 GLUCOSE BLOOD TEST: CPT

## 2023-01-09 PROCEDURE — G1010 CDSM STANSON: HCPCS

## 2023-01-09 PROCEDURE — 250N000011 HC RX IP 250 OP 636: Performed by: INTERNAL MEDICINE

## 2023-01-09 PROCEDURE — 96374 THER/PROPH/DIAG INJ IV PUSH: CPT

## 2023-01-09 PROCEDURE — 96375 TX/PRO/DX INJ NEW DRUG ADDON: CPT

## 2023-01-09 PROCEDURE — 250N000013 HC RX MED GY IP 250 OP 250 PS 637: Performed by: EMERGENCY MEDICINE

## 2023-01-09 PROCEDURE — 99285 EMERGENCY DEPT VISIT HI MDM: CPT | Mod: 25

## 2023-01-09 PROCEDURE — 36415 COLL VENOUS BLD VENIPUNCTURE: CPT | Performed by: EMERGENCY MEDICINE

## 2023-01-09 PROCEDURE — 250N000013 HC RX MED GY IP 250 OP 250 PS 637: Performed by: INTERNAL MEDICINE

## 2023-01-09 PROCEDURE — 81001 URINALYSIS AUTO W/SCOPE: CPT | Performed by: EMERGENCY MEDICINE

## 2023-01-09 RX ORDER — HYDROMORPHONE HCL IN WATER/PF 6 MG/30 ML
0.2 PATIENT CONTROLLED ANALGESIA SYRINGE INTRAVENOUS
Status: COMPLETED | OUTPATIENT
Start: 2023-01-09 | End: 2023-01-09

## 2023-01-09 RX ORDER — GABAPENTIN 300 MG/1
900 CAPSULE ORAL 2 TIMES DAILY
Status: ON HOLD | COMMUNITY
End: 2024-02-23

## 2023-01-09 RX ORDER — OXYCODONE AND ACETAMINOPHEN 5; 325 MG/1; MG/1
1 TABLET ORAL EVERY 6 HOURS PRN
Qty: 12 TABLET | Refills: 0 | Status: SHIPPED | OUTPATIENT
Start: 2023-01-09 | End: 2023-01-13

## 2023-01-09 RX ORDER — NALOXONE HYDROCHLORIDE 0.4 MG/ML
0.4 INJECTION, SOLUTION INTRAMUSCULAR; INTRAVENOUS; SUBCUTANEOUS
Status: DISCONTINUED | OUTPATIENT
Start: 2023-01-09 | End: 2023-01-14 | Stop reason: HOSPADM

## 2023-01-09 RX ORDER — NALOXONE HYDROCHLORIDE 0.4 MG/ML
0.2 INJECTION, SOLUTION INTRAMUSCULAR; INTRAVENOUS; SUBCUTANEOUS
Status: DISCONTINUED | OUTPATIENT
Start: 2023-01-09 | End: 2023-01-14 | Stop reason: HOSPADM

## 2023-01-09 RX ORDER — DEXTROSE MONOHYDRATE 25 G/50ML
25-50 INJECTION, SOLUTION INTRAVENOUS
Status: DISCONTINUED | OUTPATIENT
Start: 2023-01-09 | End: 2023-01-14 | Stop reason: HOSPADM

## 2023-01-09 RX ORDER — ACETAMINOPHEN 325 MG/1
975 TABLET ORAL EVERY 8 HOURS
Status: DISCONTINUED | OUTPATIENT
Start: 2023-01-09 | End: 2023-01-14 | Stop reason: HOSPADM

## 2023-01-09 RX ORDER — IBUPROFEN 600 MG/1
600 TABLET, FILM COATED ORAL ONCE
Status: COMPLETED | OUTPATIENT
Start: 2023-01-09 | End: 2023-01-09

## 2023-01-09 RX ORDER — MORPHINE SULFATE 4 MG/ML
4 INJECTION, SOLUTION INTRAMUSCULAR; INTRAVENOUS
Status: COMPLETED | OUTPATIENT
Start: 2023-01-09 | End: 2023-01-09

## 2023-01-09 RX ORDER — BISACODYL 10 MG
10 SUPPOSITORY, RECTAL RECTAL DAILY PRN
Status: DISCONTINUED | OUTPATIENT
Start: 2023-01-09 | End: 2023-01-14 | Stop reason: HOSPADM

## 2023-01-09 RX ORDER — TRIAMCINOLONE ACETONIDE 0.25 MG/G
OINTMENT TOPICAL 2 TIMES DAILY
COMMUNITY

## 2023-01-09 RX ORDER — GABAPENTIN 300 MG/1
900 CAPSULE ORAL 2 TIMES DAILY
Status: DISCONTINUED | OUTPATIENT
Start: 2023-01-09 | End: 2023-01-14 | Stop reason: HOSPADM

## 2023-01-09 RX ORDER — CYCLOBENZAPRINE HCL 10 MG
10 TABLET ORAL 3 TIMES DAILY PRN
Qty: 18 TABLET | Refills: 0 | Status: SHIPPED | OUTPATIENT
Start: 2023-01-09 | End: 2023-01-13

## 2023-01-09 RX ORDER — KETOROLAC TROMETHAMINE 5 MG/ML
1 SOLUTION OPHTHALMIC 4 TIMES DAILY
COMMUNITY
End: 2023-01-30

## 2023-01-09 RX ORDER — CYCLOBENZAPRINE HCL 5 MG
5 TABLET ORAL 3 TIMES DAILY PRN
Status: DISCONTINUED | OUTPATIENT
Start: 2023-01-09 | End: 2023-01-14 | Stop reason: HOSPADM

## 2023-01-09 RX ORDER — AMLODIPINE BESYLATE 10 MG/1
10 TABLET ORAL DAILY
Status: DISCONTINUED | OUTPATIENT
Start: 2023-01-10 | End: 2023-01-14 | Stop reason: HOSPADM

## 2023-01-09 RX ORDER — ONDANSETRON 4 MG/1
4 TABLET, ORALLY DISINTEGRATING ORAL EVERY 6 HOURS PRN
Status: DISCONTINUED | OUTPATIENT
Start: 2023-01-09 | End: 2023-01-14 | Stop reason: HOSPADM

## 2023-01-09 RX ORDER — ONDANSETRON 2 MG/ML
4 INJECTION INTRAMUSCULAR; INTRAVENOUS EVERY 30 MIN PRN
Status: DISCONTINUED | OUTPATIENT
Start: 2023-01-09 | End: 2023-01-09

## 2023-01-09 RX ORDER — ONDANSETRON 2 MG/ML
4 INJECTION INTRAMUSCULAR; INTRAVENOUS EVERY 6 HOURS PRN
Status: DISCONTINUED | OUTPATIENT
Start: 2023-01-09 | End: 2023-01-14 | Stop reason: HOSPADM

## 2023-01-09 RX ORDER — DULOXETIN HYDROCHLORIDE 60 MG/1
60 CAPSULE, DELAYED RELEASE ORAL DAILY
Status: ON HOLD | COMMUNITY
End: 2024-02-14

## 2023-01-09 RX ORDER — OXYCODONE HYDROCHLORIDE 5 MG/1
5 TABLET ORAL ONCE
Status: COMPLETED | OUTPATIENT
Start: 2023-01-09 | End: 2023-01-09

## 2023-01-09 RX ORDER — CYCLOBENZAPRINE HCL 10 MG
10 TABLET ORAL ONCE
Status: COMPLETED | OUTPATIENT
Start: 2023-01-09 | End: 2023-01-09

## 2023-01-09 RX ORDER — HYDROMORPHONE HCL IN WATER/PF 6 MG/30 ML
0.2 PATIENT CONTROLLED ANALGESIA SYRINGE INTRAVENOUS
Status: DISCONTINUED | OUTPATIENT
Start: 2023-01-09 | End: 2023-01-11

## 2023-01-09 RX ORDER — GATIFLOXACIN 5 MG/ML
1 SOLUTION/ DROPS OPHTHALMIC 4 TIMES DAILY
COMMUNITY
End: 2023-01-30

## 2023-01-09 RX ORDER — NICOTINE POLACRILEX 4 MG
15-30 LOZENGE BUCCAL
Status: DISCONTINUED | OUTPATIENT
Start: 2023-01-09 | End: 2023-01-14 | Stop reason: HOSPADM

## 2023-01-09 RX ORDER — LISINOPRIL 30 MG/1
30 TABLET ORAL DAILY
Status: ON HOLD | COMMUNITY
End: 2024-02-23

## 2023-01-09 RX ORDER — INSULIN LISPRO 100 [IU]/ML
INJECTION, SUSPENSION SUBCUTANEOUS
Status: ON HOLD | COMMUNITY
End: 2024-02-23

## 2023-01-09 RX ORDER — LEVOTHYROXINE SODIUM 112 UG/1
112 TABLET ORAL DAILY
COMMUNITY

## 2023-01-09 RX ORDER — LEVOTHYROXINE SODIUM 100 UG/1
100 TABLET ORAL DAILY
Status: DISCONTINUED | OUTPATIENT
Start: 2023-01-10 | End: 2023-01-14 | Stop reason: HOSPADM

## 2023-01-09 RX ORDER — ROSUVASTATIN CALCIUM 20 MG/1
20 TABLET, COATED ORAL AT BEDTIME
COMMUNITY

## 2023-01-09 RX ORDER — AMLODIPINE BESYLATE 10 MG/1
10 TABLET ORAL DAILY
COMMUNITY

## 2023-01-09 RX ORDER — OXYCODONE HYDROCHLORIDE 5 MG/1
5 TABLET ORAL EVERY 4 HOURS PRN
Status: DISCONTINUED | OUTPATIENT
Start: 2023-01-09 | End: 2023-01-11

## 2023-01-09 RX ORDER — TERBINAFINE HYDROCHLORIDE 250 MG/1
250 TABLET ORAL
COMMUNITY

## 2023-01-09 RX ORDER — PREDNISOLONE ACETATE 10 MG/ML
1 SUSPENSION/ DROPS OPHTHALMIC 4 TIMES DAILY
COMMUNITY
End: 2023-01-30

## 2023-01-09 RX ADMIN — GABAPENTIN 900 MG: 300 CAPSULE ORAL at 20:16

## 2023-01-09 RX ADMIN — OXYCODONE HYDROCHLORIDE 5 MG: 5 TABLET ORAL at 12:08

## 2023-01-09 RX ADMIN — ONDANSETRON 4 MG: 2 INJECTION INTRAMUSCULAR; INTRAVENOUS at 11:10

## 2023-01-09 RX ADMIN — MORPHINE SULFATE 4 MG: 4 INJECTION, SOLUTION INTRAMUSCULAR; INTRAVENOUS at 11:09

## 2023-01-09 RX ADMIN — IBUPROFEN 600 MG: 600 TABLET ORAL at 09:28

## 2023-01-09 RX ADMIN — CYCLOBENZAPRINE 10 MG: 10 TABLET, FILM COATED ORAL at 11:09

## 2023-01-09 RX ADMIN — OXYCODONE HYDROCHLORIDE 5 MG: 5 TABLET ORAL at 23:52

## 2023-01-09 RX ADMIN — ACETAMINOPHEN 975 MG: 325 TABLET, FILM COATED ORAL at 18:46

## 2023-01-09 RX ADMIN — ONDANSETRON 4 MG: 2 INJECTION INTRAMUSCULAR; INTRAVENOUS at 14:26

## 2023-01-09 RX ADMIN — HYDROMORPHONE HYDROCHLORIDE 0.2 MG: 0.2 INJECTION, SOLUTION INTRAMUSCULAR; INTRAVENOUS; SUBCUTANEOUS at 14:26

## 2023-01-09 RX ADMIN — HYDROMORPHONE HYDROCHLORIDE 0.2 MG: 0.2 INJECTION, SOLUTION INTRAMUSCULAR; INTRAVENOUS; SUBCUTANEOUS at 20:16

## 2023-01-09 ASSESSMENT — ACTIVITIES OF DAILY LIVING (ADL)
ADLS_ACUITY_SCORE: 35
ADLS_ACUITY_SCORE: 38
ADLS_ACUITY_SCORE: 35
ADLS_ACUITY_SCORE: 35

## 2023-01-09 ASSESSMENT — ENCOUNTER SYMPTOMS
FEVER: 0
ARTHRALGIAS: 1

## 2023-01-09 NOTE — ED NOTES
[facility]  ED Nurse Handoff Report    ED Chief complaint: Leg Pain (Pt c/o left leg pain since Saturday. She sts it feels the same aas in 2010 when she had a back fusion)      ED Diagnosis:   Final diagnoses:   Lumbar back pain with radiculopathy affecting left lower extremity       Code Status: not addressed     Allergies:   Allergies   Allergen Reactions     Hydrocodone-Acetaminophen      constipated       Patient Story: hx lumbar fusion ? Which level,  13 years ago , this is the third time has had significantpain since surgery, started spontaneously on Saturday   Focused Assessment:  Denies relief with pain meds     Treatments and/or interventions provided: morphine, oxy, flexeril, dilaudid   Patient's response to treatments and/or interventions: denies relief, unable to lay for MRI     To be done/followed up on inpatient unit:  unknown     Does this patient have any cognitive concerns?: none     Activity level - Baseline/Home:  Independent  Activity Level - Current:   Unknown    Patient's Preferred language: English   Needed?: No    Isolation: None  Infection: Not Applicable  Patient tested for COVID 19 prior to admission: YES  Bariatric?: No    Vital Signs:   Vitals:    01/09/23 1217 01/09/23 1230 01/09/23 1247 01/09/23 1302   BP: (!) 157/78 (!) 166/79 (!) 157/80 (!) 163/82   Pulse: 73 73 75 78   Resp:       Temp:       TempSrc:       SpO2: 97% 94% 95% 96%       Cardiac Rhythm:     Was the PSS-3 completed:   Yes  What interventions are required if any?               Family Comments:   OBS brochure/video discussed/provided to patient/family:               Name of person given brochure if not patient:               Relationship to patient:     For the majority of the shift this patient's behavior was .   Behavioral interventions performed were .    ED NURSE PHONE NUMBER: [phone]

## 2023-01-09 NOTE — ED PROVIDER NOTES
History     Chief Complaint:  Leg Pain (Pt c/o left leg pain since Saturday. She sts it feels the same aas in 2010 when she had a back fusion)       The history is provided by the patient and a relative (daughter).      Coretta Kolb is a 77 year old female who presents with leg pain. The patient endorses a history of L2/L3 spinal fusion about 13 years ago due to a herniated lumbar disc and spinal stenosis. Since then, she has experienced constant hip discomfort with 2-3 episodes of increased pain. Most recently, she developed terrible left hip pain about 2 nights ago. She took ibuprofen this morning, which did not help. The patient denies recent mechanical falls, injury, extensive periods of immobilization, or other recent back surgeries. She also denies loss of bowel/bladder or fever.      Independent Historian: the patient & her daughter     Review of External Notes: ED note from 9/4/2018    ROS:  Review of Systems   Constitutional: Negative for fever.   Musculoskeletal: Positive for arthralgias (L hip).     Allergies:  Hydrocodone-Acetaminophen   Metformin  Pioglitazone    Medications:    Aspirin 81 mg  Atenolol  Insulin  Levothyroxine  Lisinopril  Lisinopril-hydrochlorothiazide  Amlodipine  Oxycodone  Valcyclovir  Glipizide  Cymbalta  Crestor  Jardiance  Gabapentin  Lamisil    Past Medical History:    Type 2 diabetes  Hypertension  Hypothyroidism  Primary osteoarthritis of left wrist  BCC, face  Cataracts, bilateral  Sensorineural hearing loss  Dyslipidemia  Lichen sclerosus  Anxiety  Plantar fascial fibromatosis  Herniated lumbar disc  Spinal stenosis    Past Surgical History:    Subtalar athroereisis, bilateral feet  Endometrial biopsy  Fallopian tube ligation  L2/L3 spinal fusion    Family History:    Father: cataract    Social History:  The patient presents to the ED with her daughter.  PCP: Myra Tesfaye     Physical Exam     Patient Vitals for the past 24 hrs:   BP Temp Temp src Pulse Resp  SpO2   01/09/23 1302 (!) 163/82 -- -- 78 -- 96 %   01/09/23 1247 (!) 157/80 -- -- 75 -- 95 %   01/09/23 1230 (!) 166/79 -- -- 73 -- 94 %   01/09/23 1217 (!) 157/78 -- -- 73 -- 97 %   01/09/23 1202 (!) 153/79 -- -- 74 -- 94 %   01/09/23 1200 (!) 153/79 -- -- 74 -- 95 %   01/09/23 1157 (!) 148/75 -- -- 72 -- 97 %   01/09/23 0923 (!) 207/78 97.6  F (36.4  C) Oral 75 20 97 %        Physical Exam  General: Patient is alert and quite uncomfortable appearing  HEENT: Head atraumatic    Eyes: pupils equal and reactive. Conjunctiva clear   Nares: patent   Oropharynx: no lesions, uvula midline, no palatal draping, normal voice, no trismus  Neck: Supple without lymphadenopathy, no meningismus  Chest: Heart regular rate and rhythm.   Lungs: Equal clear to auscultation with no wheeze or rales  Abdomen: Soft, non tender, nondistended, normal bowel sounds  Back: No costovertebral angle tenderness, no midline C, T or L spine tenderness.  Left SI joint and lateral back tenderness to palpation.  No saddle anesthesia.  Strength equal bilateral lower extremities.  Reflexes equal and decreased.  Negative straight leg raise.  Neuro: Grossly nonfocal, normal speech, strength equal bilaterally, CN 2-12 intact.  Difficulty walking due to pain in her back  Extremities: No deformities, equal radial and DP pulses. No clubbing, cyanosis.  No edema  Skin: Warm and dry with no rash.       Emergency Department Course     Imaging:  MR Lumbar Spine w/o Contrast   Final Result   IMPRESSION:     1. Limited imaging consisting of only a sagittal T2 sequence. Exam was   terminated early due to patient's request.   2. Postoperative changes at L4-L5. Metal hardware at that level causes   artifact and limits evaluation.   3. Marked degenerative changes at the nonoperative levels particularly   L3-L4, L2-L3, and L1-L2. Recommend repeat imaging when the patient is   able.      ANIYAH ADAMES MD            SYSTEM ID:  [id number]         Report per  radiology    Laboratory:  Labs Ordered and Resulted from Time of ED Arrival to Time of ED Departure   BASIC METABOLIC PANEL - Abnormal       Result Value    Sodium 134      Potassium 4.1      Chloride 103      Carbon Dioxide (CO2) 23      Anion Gap 8      Urea Nitrogen 22      Creatinine 0.62      Calcium 9.6      Glucose 273 (*)     GFR Estimate >90     CBC WITH PLATELETS AND DIFFERENTIAL - Abnormal    WBC Count 12.8 (*)     RBC Count 4.82      Hemoglobin 14.3      Hematocrit 42.8      MCV 89      MCH 29.7      MCHC 33.4      RDW 13.3      Platelet Count 290      % Neutrophils 77      % Lymphocytes 14      % Monocytes 4      % Eosinophils 3      % Basophils 1      % Immature Granulocytes 1      NRBCs per 100 WBC 0      Absolute Neutrophils 10.0 (*)     Absolute Lymphocytes 1.8      Absolute Monocytes 0.5      Absolute Eosinophils 0.4      Absolute Basophils 0.1      Absolute Immature Granulocytes 0.1      Absolute NRBCs 0.0     ROUTINE UA WITH MICROSCOPIC REFLEX TO CULTURE      Emergency Department Course & Assessments:         Interventions:  0928 Ibuprofen 600 mg PO  1109 Morphine 4 mg IV  1109 Flexeril 10 mg PO  1110 Zofran 4 mg IV  1208 Oxycodone 5 mg PO       Consultations/Discussion of Management or Tests:  1049 I obtained patient history and examined the patient as noted above.   1204 I rechecked the patient and discussed lab findings.  1345 patient's had MRI.  I updated the patient's daughter.  1411 patient returned from MRI with incomplete imaging.  She feels too much pain to lay on the MRI table.  She states this is what happened to her when she needed surgery back in 1410.  She feels no relief of pain with medications provided here.  1417 I discussed with Dr. Joe of the hospitalist service    Disposition:  Dr. Joe    Impression & Plan    Medical Decision Making:  Coretta Kolb is a 77 year old female who presents for evaluation of back pain and radicular symptoms. They have history of  back pain in the past with previous surgery.  Patient with continued pain despite pain medications here.  She had not attempted pain medication at home.  Given this, as well as leukocytosis and previous surgery I felt she warranted MRI.  Patient with no new trauma.  There is no clinical evidence of cauda equina syndrome, discitis, spinal/epidural space hematoma or epidural abscess although patient with intractable pain here, leukocytosis and requires further imaging.  Patient went to MRI but was unable to withstand the test due to pain and being unable to lie on the bed.  She feels no relief with pain medications prescribed here.  She denies no new weakness.  Her pain is greatest in the lateral leg radiating to her knee.  There is no erythema or ecchymosis overlying the area and no signs of a rash.  Compartments are soft.  There is no knee pain erythema or warmth.    Given patient's intractable pain and inability to have MRI performed.  Patient in addition lives alone and feels she is unable to get around her house due to her pain level will require observation stay for continued pain control, spine consultation and repeat attempts at MRI.  Patient does not feel she safe to go home given her pain.      Diagnosis:    ICD-10-CM    1. Lumbar back pain with radiculopathy affecting left lower extremity  M54.16       2. Intractable pain  R52                Scribe Disclosure:  Agustín MERINO, am serving as a scribe at 10:46 AM on 1/9/2023 to document services personally performed by Lorin Cuevas MD  based on my observations and the provider's statements to me.     1/9/2023   Lorin Cuevas MD Neuner, Maria Bea, MD  01/09/23 9783

## 2023-01-09 NOTE — H&P
[facility]    History and Physical  Hospitalist       Date of Admission:  1/9/2023    Assessment & Plan   Coretta Kolb is a 77 year old female who presents with back pain.    Intractable left lower back and left lateral thigh pain.(Likely L2-4 radiculopathy)  History of L2-L3 hemilaminectomy and discectomy, L3-5 laminectomy and foraminotomies bilaterally and L4-5 spinal fusion in November 2010  -Patient appears to have longstanding problem with lumbar spine disease  -Underwent above surgery in 2010, with Dr. Gutierrez of [facility] and subsequently has had multiple epidural injection with last injection being left L3 transforaminal epidural steroid injection pain 2016  -Presents with left lower back pain and left lateral thigh pain(apparently similar in presentatio to previous episodes)  -MRI of the spine was attempted today however was terminated early due to patient wishes due to ongoing pain.  This was limited which shows postoperative changes at L4-L5, marked degenerative changes at the nonoperative levels particularly L3-L4, L2-L3 and L1-L2  -Pain control with scheduled Tylenol, as needed oxycodone and IV Dilaudid.    -Continue prior to admission Neurontin.  -Flexeril 5 mg 3 times daily as needed  -Spine surgery consult  -Physical therapy evaluation    Insulin-dependent diabetes mellitus type 2  -Prior to admission on Lantus and scheduled Humalog  -Resume prior to admission Lantus and Humalog once dose verified by pharmacy  -High intensity sliding scale    Essential hypertension  -Resume PTA atenolol and lisinopril    Hypothyroidism  -Continue levothyroxine      DVT Prophylaxis: Pneumatic Compression Devices  Code Status: Full Code    Disposition: Expected discharge in less than 2 days    Adolfo Amaya MD, MD    Primary Care Physician   Myra Tesfaye    Chief Complaint   Back pain    History is obtained from the patient    History of Present Illness   Coretta Kolb is  a 77 year old female who presents with left lower back and left lateral thigh pain.  Patient has an history of lumbar spine surgery as mentioned above which was done in 2010 and subsequent to that has had on and off pain of her back and left lateral thigh.  She is also had few epidural steroid injection subsequent to the surgery.  Over the last few years though she has not required any intervention, she does have some low-grade on and off pain but nothing major.   This past Saturday i.e. 2 days ago she started having pain in the same area which got worse yesterday to the point it was 10/10 in intensity.  The pain is in her left lateral lumbar area and left lateral thigh.  There is no radiation of the pain and does not extend below her knee.  Denies any fever or chills.  Patient denies any trauma or falls.  She denies bowel or bladder incontinence.  As far as other review of system is concerned she has insulin-dependent diabetes mellitus and peripheral neuropathy and has pins and needle sensation on bilateral lower extremity.  Denies dysuria, urinary urgency or frequency.  No hematochezia or melena.    In the emergency room the patient was found to be in intractable pain.  An MRI of the lumbar spine was attempted which was inadequate study and had to be terminated early.  Given inadequate pain control and inability to get out of the bed she is admitted for pain control and further evaluation.    Past Medical History    I have reviewed this patient's medical history and updated it with pertinent information if needed.   Past Medical History:   Diagnosis Date     Diabetes (H)      Hypertension      Hypothyroid        Past Surgical History   I have reviewed this patient's surgical history and updated it with pertinent information if needed.  Past Surgical History:   Procedure Laterality Date     ORTHOPEDIC SURGERY         Prior to Admission Medications   Prior to Admission Medications   Prescriptions Last Dose Informant  Patient Reported? Taking?   ATENOLOL PO   Yes No   Artificial Tear Ointment (ARTIFICIAL TEARS) OINT ophthalmic ointment   No No   Sig: Place 2 g into the right eye At Bedtime   Insulin Lispro, Human, (HUMALOG KWIKPEN SC)   Yes No   LEVOTHYROXINE SODIUM PO   Yes No   LISINOPRIL PO   Yes No   aspirin 81 MG tablet   Yes No   Sig: Take 81 mg by mouth daily   insulin glargine (LANTUS VIAL) 100 UNIT/ML soln   Yes No   Sig: Inject Subcutaneous At Bedtime   oxyCODONE IR (ROXICODONE) 5 MG tablet   No No   Sig: Take 1-2 tablets (5-10 mg) by mouth every 6 hours as needed for pain   valACYclovir (VALTREX) 1000 mg tablet   No No   Sig: Take 1 tablet (1,000 mg) by mouth 3 times daily      Facility-Administered Medications: None     Allergies   Allergies   Allergen Reactions     Hydrocodone-Acetaminophen      constipated       Social History   I have reviewed this patient's social history and updated it with pertinent information if needed. Coretta Kolb  reports that she has never smoked. She does not have any smokeless tobacco history on file. She reports current alcohol use. She reports that she does not use drugs.    Family History   I have reviewed this patient's family history and updated it with pertinent information if needed.   No family history on file.    Review of Systems   The 10 point Review of Systems is negative other than noted in the HPI or here.     Physical Exam   Temp: 97.6  F (36.4  C) Temp src: Oral BP: (!) 163/82 Pulse: 78   Resp: 20 SpO2: 96 %      Vital Signs with Ranges  Temp:  [97.6  F (36.4  C)] 97.6  F (36.4  C)  Pulse:  [72-78] 78  Resp:  [20] 20  BP: (148-207)/(75-82) 163/82  SpO2:  [94 %-97 %] 96 %  0 lbs 0 oz    Gen: AAOX3, NAD, comfortable  HEENT: Moist oral mucosa, no pallor or icterus  Neck: Supple neck, good ROM, no stridor  Resp: CTA B/L, normal WOB; no crackles; no wheezes  CVS- RRR, no murmur, no edema  Abd/GI: Soft, non-tender. BS- normoactive  Skin- Warm, dry, no rashes  MSK: Area  of mild tenderness in the left lateral thigh area.  No spinal midline tenderness in the lumbar area, 1+ pedal edema bilaterally  Neuro- CN- intact.  Moving all 4 extremities however somewhat restricted range of motion of the left thigh.    Data   Data reviewed today:  I personally reviewed no images or EKG's today.  Recent Labs   Lab 01/09/23  1104   WBC 12.8*   HGB 14.3   MCV 89         POTASSIUM 4.1   CHLORIDE 103   CO2 23   BUN 22   CR 0.62   ANIONGAP 8   KATERIN 9.6   *       Recent Results (from the past 24 hour(s))   MR Lumbar Spine w/o Contrast    Narrative    MRI LUMBAR SPINE WITHOUT CONTRAST January 9, 2023 1:53 PM     HISTORY: Low back pain radiating down the left lower.     TECHNIQUE: Exam was terminated early per patient's wishes. Only the  sagittal T2 sequence was obtained.    COMPARISON: None.     FINDINGS: Limited imaging consisting of only sagittal T2 sequence.  Metallic fixation hardware at the L4-L5 level. Metal hardware causes  artifact and limits evaluation. Mild grade 1 retrolisthesis of L1 on  L2. No obvious loss of vertebral body height. Several presumed  Schmorl's node deformities in the lumbar spine. Marked degenerative  endplate changes and loss of disc height at L2-L3 and L3-L4. Moderate  degenerative endplate changes and loss of disc height at L1-L2. There  appears to be facet hypertrophy at L2-L3 and L3-L4. Apparent marked  spinal canal narrowing at L3-L4. Mild to moderate spinal canal  narrowing at L1-L2. Marked neural foraminal narrowings at L2-L3 and  L3-L4.      Impression    IMPRESSION:    1. Limited imaging consisting of only a sagittal T2 sequence. Exam was  terminated early due to patient's request.  2. Postoperative changes at L4-L5. Metal hardware at that level causes  artifact and limits evaluation.  3. Marked degenerative changes at the nonoperative levels particularly  L3-L4, L2-L3, and L1-L2. Recommend repeat imaging when the patient is  able.    ANIYAH RIVERA  MD ROSANGELA         SYSTEM ID:  [id number]

## 2023-01-09 NOTE — PROGRESS NOTES
RECEIVING UNIT ED HANDOFF REVIEW    ED Nurse Handoff Report was reviewed by: Jeannie Staley RN on January 9, 2023 at 5:20 PM

## 2023-01-09 NOTE — PHARMACY-ADMISSION MEDICATION HISTORY
Pharmacy Medication History  Admission medication history interview status for the 1/9/2023  admission is complete. See EPIC admission navigator for prior to admission medications     Location of Interview: Patient room  Medication history sources: Patient, Surescripts and Care Everywhere    Significant changes made to the medication list:  Deleted aspirin atenolol, oxycodone valtrex  Added duloxetine, gabapentin, crestor, amlodipine, triamcinolone, eye drops (3)    In the past week, patient estimated taking medication this percent of the time: less than 50% due to pt says she hasn't started terbinafine and currently not taking jardiance    Additional medication history information:   Pt did not have a list with her and was not a good historian so doses, medication details from Care Everywhere and Surescripts, which pt tried to confirm.    Medication reconciliation completed by provider prior to medication history? No    Time spent in this activity: 25 minutes    Prior to Admission medications    Medication Sig Last Dose Taking? Auth Provider Long Term End Date   amLODIPine (NORVASC) 10 MG tablet Take 10 mg by mouth daily 1/9/2023 at am Yes Unknown, Entered By History Yes    cyclobenzaprine (FLEXERIL) 10 MG tablet Take 1 tablet (10 mg) by mouth 3 times daily as needed for muscle spasms  Yes Lorin Cuevas MD  1/15/23   DULoxetine (CYMBALTA) 60 MG capsule Take 60 mg by mouth daily 1/9/2023 at am Yes Unknown, Entered By History Yes    gabapentin (NEURONTIN) 300 MG capsule Take 900 mg by mouth 2 times daily 1/9/2023 at am Yes Unknown, Entered By History Yes    gatifloxacin (ZYMAXID) 0.5 % ophthalmic solution Place 1 drop into both eyes 4 times daily 1/9/2023 Yes Unknown, Entered By History     insulin glargine (LANTUS VIAL) 100 UNIT/ML soln Inject 32 Units Subcutaneous every morning 1/9/2023 at am Yes Reported, Patient Yes    insulin lispro protamine-insulin lispro (HUMALOG MIX 75/25 KWIKPEN) (75-25) 100 UNIT/ML  pen Inject Subcutaneous 2 times daily (before meals) 50 units with breakfast in the morning and 60 units with dinner in the evening 1/9/2023 at am Yes Unknown, Entered By History Yes    ketorolac (ACULAR) 0.5 % ophthalmic solution Place 1 drop into both eyes 4 times daily 1/9/2023 at am Yes Unknown, Entered By History     levothyroxine (SYNTHROID/LEVOTHROID) 100 MCG tablet Take 100 mcg by mouth daily 1/9/2023 at am Yes Unknown, Entered By History Yes    lisinopril (ZESTRIL) 30 MG tablet Take 30 mg by mouth daily 1/9/2023 at am Yes Unknown, Entered By History Yes    oxyCODONE-acetaminophen (PERCOCET) 5-325 MG tablet Take 1 tablet by mouth every 6 hours as needed for pain  Yes Lorin Cuevas MD  1/12/23   prednisoLONE acetate (PRED FORTE) 1 % ophthalmic suspension Place 1 drop into both eyes 4 times daily 1/9/2023 at am Yes Unknown, Entered By History     rosuvastatin (CRESTOR) 10 MG tablet Take 10 mg by mouth At Bedtime 1/8/2023 at pm Yes Unknown, Entered By History Yes    terbinafine (LAMISIL) 250 MG tablet Take 250 mg by mouth Daily for one week each month Past Month Yes Unknown, Entered By History     triamcinolone (KENALOG) 0.025 % external ointment Apply topically 2 times daily To lower legs 1/9/2023 at am Yes Unknown, Entered By History     empagliflozin (JARDIANCE) 25 MG TABS tablet Take 25 mg by mouth daily  Patient not taking: Reported on 1/9/2023 Not Taking  Unknown, Entered By History         The information provided in this note is only as accurate as the sources available at the time of update(s)

## 2023-01-10 ENCOUNTER — APPOINTMENT (OUTPATIENT)
Dept: GENERAL RADIOLOGY | Facility: CLINIC | Age: 78
End: 2023-01-10
Attending: NURSE PRACTITIONER
Payer: MEDICARE

## 2023-01-10 ENCOUNTER — APPOINTMENT (OUTPATIENT)
Dept: PHYSICAL THERAPY | Facility: CLINIC | Age: 78
End: 2023-01-10
Attending: INTERNAL MEDICINE
Payer: MEDICARE

## 2023-01-10 LAB
BACTERIA UR CULT: ABNORMAL
ERYTHROCYTE [DISTWIDTH] IN BLOOD BY AUTOMATED COUNT: 13.2 % (ref 10–15)
GLUCOSE BLDC GLUCOMTR-MCNC: 112 MG/DL (ref 70–99)
GLUCOSE BLDC GLUCOMTR-MCNC: 139 MG/DL (ref 70–99)
GLUCOSE BLDC GLUCOMTR-MCNC: 153 MG/DL (ref 70–99)
GLUCOSE BLDC GLUCOMTR-MCNC: 174 MG/DL (ref 70–99)
GLUCOSE BLDC GLUCOMTR-MCNC: 193 MG/DL (ref 70–99)
GLUCOSE BLDC GLUCOMTR-MCNC: 225 MG/DL (ref 70–99)
GLUCOSE BLDC GLUCOMTR-MCNC: 77 MG/DL (ref 70–99)
GLUCOSE BLDC GLUCOMTR-MCNC: 80 MG/DL (ref 70–99)
HCT VFR BLD AUTO: 39 % (ref 35–47)
HGB BLD-MCNC: 12.7 G/DL (ref 11.7–15.7)
MCH RBC QN AUTO: 29.1 PG (ref 26.5–33)
MCHC RBC AUTO-ENTMCNC: 32.6 G/DL (ref 31.5–36.5)
MCV RBC AUTO: 89 FL (ref 78–100)
PLATELET # BLD AUTO: 271 10E3/UL (ref 150–450)
RBC # BLD AUTO: 4.36 10E6/UL (ref 3.8–5.2)
WBC # BLD AUTO: 10 10E3/UL (ref 4–11)

## 2023-01-10 PROCEDURE — G0378 HOSPITAL OBSERVATION PER HR: HCPCS

## 2023-01-10 PROCEDURE — 36415 COLL VENOUS BLD VENIPUNCTURE: CPT | Performed by: INTERNAL MEDICINE

## 2023-01-10 PROCEDURE — 250N000012 HC RX MED GY IP 250 OP 636 PS 637: Performed by: INTERNAL MEDICINE

## 2023-01-10 PROCEDURE — 99221 1ST HOSP IP/OBS SF/LOW 40: CPT | Performed by: PHYSICIAN ASSISTANT

## 2023-01-10 PROCEDURE — 97530 THERAPEUTIC ACTIVITIES: CPT | Mod: GP

## 2023-01-10 PROCEDURE — 250N000013 HC RX MED GY IP 250 OP 250 PS 637: Performed by: INTERNAL MEDICINE

## 2023-01-10 PROCEDURE — 82962 GLUCOSE BLOOD TEST: CPT

## 2023-01-10 PROCEDURE — 73590 X-RAY EXAM OF LOWER LEG: CPT | Mod: LT

## 2023-01-10 PROCEDURE — 85027 COMPLETE CBC AUTOMATED: CPT | Performed by: INTERNAL MEDICINE

## 2023-01-10 PROCEDURE — 97161 PT EVAL LOW COMPLEX 20 MIN: CPT | Mod: GP

## 2023-01-10 PROCEDURE — 250N000011 HC RX IP 250 OP 636: Performed by: INTERNAL MEDICINE

## 2023-01-10 PROCEDURE — 96372 THER/PROPH/DIAG INJ SC/IM: CPT | Performed by: INTERNAL MEDICINE

## 2023-01-10 PROCEDURE — 96375 TX/PRO/DX INJ NEW DRUG ADDON: CPT

## 2023-01-10 PROCEDURE — 99232 SBSQ HOSP IP/OBS MODERATE 35: CPT | Performed by: INTERNAL MEDICINE

## 2023-01-10 PROCEDURE — 99207 PR APP CREDIT; MD BILLING SHARED VISIT: CPT | Performed by: NURSE PRACTITIONER

## 2023-01-10 RX ORDER — CEFTRIAXONE 1 G/1
1 INJECTION, POWDER, FOR SOLUTION INTRAMUSCULAR; INTRAVENOUS EVERY 24 HOURS
Status: DISCONTINUED | OUTPATIENT
Start: 2023-01-10 | End: 2023-01-11

## 2023-01-10 RX ADMIN — GABAPENTIN 900 MG: 300 CAPSULE ORAL at 21:50

## 2023-01-10 RX ADMIN — LISINOPRIL 30 MG: 20 TABLET ORAL at 08:12

## 2023-01-10 RX ADMIN — INSULIN GLARGINE 32 UNITS: 100 INJECTION, SOLUTION SUBCUTANEOUS at 13:01

## 2023-01-10 RX ADMIN — INSULIN ASPART 50 UNITS: 100 INJECTION, SUSPENSION SUBCUTANEOUS at 11:50

## 2023-01-10 RX ADMIN — CEFTRIAXONE 1 G: 1 INJECTION, POWDER, FOR SOLUTION INTRAMUSCULAR; INTRAVENOUS at 16:10

## 2023-01-10 RX ADMIN — GABAPENTIN 900 MG: 300 CAPSULE ORAL at 08:12

## 2023-01-10 RX ADMIN — ACETAMINOPHEN 975 MG: 325 TABLET, FILM COATED ORAL at 10:47

## 2023-01-10 RX ADMIN — AMLODIPINE BESYLATE 10 MG: 10 TABLET ORAL at 08:12

## 2023-01-10 RX ADMIN — INSULIN ASPART 3 UNITS: 100 INJECTION, SOLUTION INTRAVENOUS; SUBCUTANEOUS at 11:49

## 2023-01-10 RX ADMIN — LEVOTHYROXINE SODIUM 100 MCG: 100 TABLET ORAL at 08:12

## 2023-01-10 RX ADMIN — CYCLOBENZAPRINE 5 MG: 5 TABLET, FILM COATED ORAL at 08:12

## 2023-01-10 RX ADMIN — Medication 1 MG: at 23:53

## 2023-01-10 RX ADMIN — ACETAMINOPHEN 975 MG: 325 TABLET, FILM COATED ORAL at 03:45

## 2023-01-10 RX ADMIN — ACETAMINOPHEN 975 MG: 325 TABLET, FILM COATED ORAL at 17:04

## 2023-01-10 ASSESSMENT — ACTIVITIES OF DAILY LIVING (ADL)
ADLS_ACUITY_SCORE: 45
ADLS_ACUITY_SCORE: 43
DEPENDENT_IADLS:: INDEPENDENT
ADLS_ACUITY_SCORE: 43
ADLS_ACUITY_SCORE: 44
ADLS_ACUITY_SCORE: 38
ADLS_ACUITY_SCORE: 43
ADLS_ACUITY_SCORE: 41
ADLS_ACUITY_SCORE: 45
ADLS_ACUITY_SCORE: 40
ADLS_ACUITY_SCORE: 40
ADLS_ACUITY_SCORE: 45
ADLS_ACUITY_SCORE: 41

## 2023-01-10 NOTE — PLAN OF CARE
Observation goals  PRIOR TO DISCHARGE        Comments:   -diagnostic tests and consults completed and resulted: Not met   -vital signs normal or at patient baseline: Met  -adequate pain control on oral analgesics: Not met  Nurse to notify provider when observation goals have been met and patient is ready for discharge.

## 2023-01-10 NOTE — PLAN OF CARE
Observation Goals:    -diagnostic tests and consults completed and resulted: Not met  -vital signs normal or at patient baseline: Met  -adequate pain control on oral analgesics: Not Met  Nurse to notify provider when observation goals have been met and patient is ready for discharge.

## 2023-01-10 NOTE — PROGRESS NOTES
House NP paged to come assess pt post fall. Patient was attempting to get up from bedside commode with assistance from nursing aid when she states her L leg gave out on her. She went down to the floor and states she landed on her L knee. She is now having increased pain in the L knee, small abrasion and bruising noted. Denies hitting head. Neuros intact. Assisted back into bed with ceiling lift/team. NP to come assess if further testing needed. Ice applied to knee.

## 2023-01-10 NOTE — CONSULTS
Care Management Initial Consult    General Information  Assessment completed with: Patient,    Type of CM/SW Visit: Initial Assessment    Primary Care Provider verified and updated as needed:     Readmission within the last 30 days:        Reason for Consult: discharge planning  Advance Care Planning:            Communication Assessment  Patient's communication style: spoken language (English or Bilingual)             Cognitive  Cognitive/Neuro/Behavioral: WDL                      Living Environment:   People in home: alone     Current living Arrangements: house      Able to return to prior arrangements:         Family/Social Support:  Care provided by: self  Provides care for:    Marital Status: Single  Children          Description of Support System: Involved, Supportive    Support Assessment: Adequate social supports, Adequate family and caregiver support    Current Resources:   Patient receiving home care services: No     Community Resources: None  Equipment currently used at home: cane, straight, walker, standard  Supplies currently used at home: None    Employment/Financial:  Employment Status:          Financial Concerns:             Lifestyle & Psychosocial Needs:  Social Determinants of Health     Tobacco Use: Not on file   Alcohol Use: Not on file   Financial Resource Strain: Not on file   Food Insecurity: Not on file   Transportation Needs: Not on file   Physical Activity: Not on file   Stress: Not on file   Social Connections: Not on file   Intimate Partner Violence: Not on file   Depression: Not on file   Housing Stability: Not on file       Functional Status:  Prior to admission patient needed assistance:   Dependent ADLs:: Independent  Dependent IADLs:: Independent       Mental Health Status:          Chemical Dependency Status:                Values/Beliefs:  Spiritual, Cultural Beliefs, Confucianist Practices, Values that affect care:                 Additional Information:  CM/SW consulted for  discharge planning. SW reviewed chart. Patient is a 77 year old female who admitted to the hospital on 1/9/23 with left lower back pain and left lateral thigh pain and was admitted for pain control and further evaluation.   SW met with patient at bedside to introduce self and role and discuss discharge planning. Patient lives in a town home alone and she is independent there.   PT is recommending TCU for rehab prior to returning home. Patient was somewhat hesitant because she cannot have her car there and come and go as she pleases, but she ultimately decided it would be a good opportunity. SW left SNF list with patient and her daughter so they could review and choose their preferences. Daughter wanted to cross check list with Medicare.gov. SW also explained insurance coverage to patient and daughter. SW to follow up tomorrow to get choices and send referrals.   SW will continue to follow for discharge planning.     BERENICE Silver

## 2023-01-10 NOTE — PLAN OF CARE
Observation goals  PRIOR TO DISCHARGE        Comments:   -diagnostic tests and consults completed and resulted: Not met   -vital signs normal or at patient baseline: Not met  -adequate pain control on oral analgesics: Not met  Nurse to notify provider when observation goals have been met and patient is ready for discharge.

## 2023-01-10 NOTE — UTILIZATION REVIEW
"Admission Status; Secondary Review Determination     Admission Date: 1/9/2023 10:44 AM       Under the authority of the Utilization Management Committee, the utilization review process indicated a secondary review on the above patient.  The review outcome is based on review of the medical records, discussions with staff, and applying clinical experience noted on the date of the review.          (x) Observation Status Appropriate - This patient does not meet hospital inpatient criteria and is placed in observation status. If this patient's primary payer is Medicare and was admitted as an inpatient, Condition Code 44 should be used and patient status changed to \"observation\".       RATIONALE FOR DETERMINATION      Brief clinical presentation, information copied from the chart, abbreviated and edited for relevant content:     Other than LOS, no criteria for IP.       Coretta Kolb is a 77 year old female who presented with recurrent back pain. History of L2-L3 hemilaminectomy and discectomy, L3-5 laminectomy and foraminotomies bilaterally and L4-5 spinal fusion in November 2010. MRI of the spine was attempted at presentation however was terminated early due to patient wishes due to ongoing pain.  This was limited which shows postoperative changes at L4-L5, marked degenerative changes at the nonoperative levels particularly L3-L4, L2-L3 and L1-L2   Patient mentioned that pain is very consistent with previous pains which she had relief with epidural steroid injection. Pain control with scheduled Tylenol, as needed oxycodone and IV Dilaudid.Neurosurgery consulted, plan is for epidural steroid injection if radiology is okay with the current imaging.  If not we will have to reimage her lumbar spine again probably under sedation.         . The severity of illness, intensity of cares provided, risk for adverse outcome, and expected LOS make the care appropriate for observation.       The information on this document is " developed by the utilization review team in order for the business office to ensure compliance.  This only denotes the appropriateness of proper admission status and does not reflect the quality of care rendered.         The definitions of Inpatient Status and Observation Status used in making the determination above are those provided in the CMS Coverage Manual, Chapter 1 and Chapter 6, section 70.4.      Sincerely,     Kerline Puga MD   Utilization Review/ Case Management  [facility].

## 2023-01-10 NOTE — PLAN OF CARE
Orientation/Cognitive: A&O; forgetful.   Observation Goals (Met/ Not Met): Not met  Mobility Level/Assist Equipment: A2 GB/W. Pt had fall this morning due to L leg giving out on her.   Fall Risk (Y/N): Yes  Behavior Concerns: None  Pain Management: Scheduled tylenol. PRN flexeril given. Prn Oxy and dilaudid available. Pt feel meds aren't helping.   Tele/VS/O2: VSS on RA  ABNL Lab/BG: B 193  Diet: Mod Carb  Bowel/Bladder: Urinary frequency. Purewick in place.   Skin Concerns: Edema in BLE; small abrasion to L knee post fall.   Drains/Devices: PIV SL  Tests/Procedures for next shift: epidural steroid injection tomorrow.   Anticipated DC date & active delays: TBD; will need TCU at discharge.   Patient Stated Goal for Today: Pain control

## 2023-01-10 NOTE — PROGRESS NOTES
Radiology injection provider requesting the patient obtain a completed MRI prior to performing an epidural steroid injection.  I have reordered lumbar MRI, and will ask the hospitalist to provide the patient with sedation either oral or IV to better help her complete the imaging.

## 2023-01-10 NOTE — PROGRESS NOTES
[id number]  Alert and orineted x4. VSS. Denies SOB. Clear but diminished lung sounds. On RA, sating at 96%. Tolerating mod carb diet. Denies nausea and vomiting. Assist of 1 with GB/W. Denied pain upon arrival to the unit. BG checks with meals, has sliding scale insulin. PCDs in place.

## 2023-01-10 NOTE — SIGNIFICANT EVENT
"[facility]    Fall Note  1/10/2023   Time Called: 1019    Date of Admission:  1/9/2023  Code Status: Full Code    Pt was transferring from [facility] when her leg \"gave out\". She was lowered to the ground with staff, but fell on to her left knee in the meantime.     Summary:  I was called to evaluate Coretta Kolb, a 77 year old female following an assisted fall. The patient is admitted for evaluation of back pain. PMH includes DMII, HTN, hypothyroidism.  Patient denies preceding symptoms of chest pain, dyspnea, dizziness.     Assessment:  On my arrival, patient is semi upright  In bed, left lower extremity elevated w/ ice applied.  She endorses ongoing left knee pain, and pain below patella. Pt did brace herself with arms, but denies any discomfort.     Temp: 98  F (36.7  C) Temp src: Oral BP: (!) 178/84 Pulse: 82   Resp: 16 SpO2: 97 % O2 Device: None (Room air)       Plan:  -- tib/fib XR LLE   -- continue working with PT     Physical Exam  Vitals and nursing note reviewed.   Constitutional:       Appearance: Normal appearance.   HENT:      Head: Atraumatic.   Musculoskeletal:      Right elbow: Normal.      Left elbow: Normal.      Right wrist: Normal.      Left wrist: Normal.      Right hand: Normal.      Left hand: Normal.      Left knee: Erythema and bony tenderness present. Decreased range of motion. Tenderness present.        Legs:    Skin:     Findings: Abrasion and bruising present.          Psychiatric:         Behavior: Behavior normal. Behavior is cooperative.      Not all injuries from a fall are obvious on initial exam, please to not hesitate to call for reassessment by House provider should the patient's clinical status change, report new pain, or patient/nursing concern.         HANY Miranda [facility]  Hospitalist Service  House Officer  Pager: [phone] (7a - 6p)     Medical Decision Making     15 MINUTES SPENT BY ME on the date of service doing chart review, history, " exam, documentation & further activities per the note.

## 2023-01-10 NOTE — PROGRESS NOTES
[facility]    Medicine Progress Note - Hospitalist Service        Date of Admission:  1/9/2023 10:44 AM    Assessment & Plan:   Coretta Kolb is a 77 year old female who presents with back pain.     Intractable left lower back and left lateral thigh pain.(Likely L2-4 radiculopathy)  History of L2-L3 hemilaminectomy and discectomy, L3-5 laminectomy and foraminotomies bilaterally and L4-5 spinal fusion in November 2010  -Patient appears to have longstanding problem with lumbar spine disease  -Underwent above surgery in 2010, with Dr. Gutierrez of [facility] and subsequently has had multiple epidural injection with last injection being left L3 transforaminal epidural steroid injection pain 2016  -Presents with left lower back pain and left lateral thigh pain(apparently similar in presentatio to previous episodes)  -MRI of the spine was attempted at presentation however was terminated early due to patient wishes due to ongoing pain.  This was limited which shows postoperative changes at L4-L5, marked degenerative changes at the nonoperative levels particularly L3-L4, L2-L3 and L1-L2   -Patient mentions that pain is very consistent with previous pains which she had relief with epidural steroid injection  -Pain control with scheduled Tylenol, as needed oxycodone and IV Dilaudid.    -Continue prior to admission Neurontin.  -Flexeril 5 mg 3 times daily as needed  -Neurosurgery consulted, plan is for epidural steroid injection if radiology is okay with the current imaging.  If not we will have to reimage her lumbar spine again probably under sedation.  -Physical therapy evaluation  -Patient sustained a fall earlier, x-ray of the left tibia-fibula does not show any fracture, continue to monitor closely     Insulin-dependent diabetes mellitus type 2  -Continue NovoLog 70/30 at PTA dose of 50 units in the morning and 60 units in the evening  -Continue Lantus 32 units every morning  -High intensity sliding  "scale    ?  UTI  -Urine analysis was abnormal, urine culture now growing strep, start empiric Rocephin     Essential hypertension  -Resume PTA atenolol and lisinopril     Hypothyroidism  -Continue levothyroxine    ?  Cognitive impairment  -Daughter concerned about patient's recent memory.  -Due to concern for possible cognitive impairment we will get OT eval          Diet: Moderate Consistent Carb (60 g CHO per Meal) Diet     DVT Prophylaxis: Pneumatic Compression Devices   Rodriguez Catheter: Not present  Code Status: Full Code     Disposition Plan       Expected Discharge Date: 01/11/2023        Discharge Comments: PT consult.      Entered: Adolfo Amaya MD 01/10/2023, 2:14 PM        Clinically Significant Risk Factors Present on Admission                  # Hypertension: home medication list includes antihypertensive(s)      # Severe Obesity: Estimated body mass index is 45.91 kg/m  as calculated from the following:    Height as of this encounter: 1.549 m (5' 1\").    Weight as of this encounter: 110.2 kg (243 lb).              The patient's care was discussed with the Bedside Nurse, Patient and Patient's Family.    Adolfo Amaya MD  Hospitalist Service  [facility]  Text Page 7AM-6PM  Securely message with the Vocera Web Console (learn more here)  Text page via [employer] Paging/Directory    ______________________________________________________________________    Interval History   Continues to have pain in the left lateral thigh area and left lower back.  Patient sustained a fall earlier but thankfully did not appear to have any injury.    Data reviewed today: I reviewed all medications, new labs and imaging results over the last 24 hours. I personally reviewed no images or EKG's today.    Physical Exam   Vital signs:  Temp: 98  F (36.7  C) Temp src: Oral BP: (!) 178/84 Pulse: 82   Resp: 16 SpO2: 97 % O2 Device: None (Room air)   Height: 154.9 cm (5' 1\") Weight: 110.2 kg (243 " "lb)  Estimated body mass index is 45.91 kg/m  as calculated from the following:    Height as of this encounter: 1.549 m (5' 1\").    Weight as of this encounter: 110.2 kg (243 lb).      Wt Readings from Last 2 Encounters:   01/09/23 110.2 kg (243 lb)   11/30/22 99.8 kg (220 lb)       Gen: AAOX3, NAD, comfortable  Resp: CTA B/L, normal WOB  CVS: RRR, no murmur  Abd/GI: Soft, non-tender. BS- normoactive.    Skin: Warm, dry no rashes  MSK: Left knee with small bruise, no other areas of injury apparent.  Still has mild tenderness over left lateral thigh  Neuro- CN- intact.       Data   Recent Labs   Lab 01/10/23  1229 01/10/23  1107 01/10/23  0758 01/10/23  0734 01/09/23  1824 01/09/23  1104   WBC  --   --   --  10.0  --  12.8*   HGB  --   --   --  12.7  --  14.3   MCV  --   --   --  89  --  89   PLT  --   --   --  271  --  290   NA  --   --   --   --   --  134   POTASSIUM  --   --   --   --   --  4.1   CHLORIDE  --   --   --   --   --  103   CO2  --   --   --   --   --  23   BUN  --   --   --   --   --  22   CR  --   --   --   --   --  0.62   ANIONGAP  --   --   --   --   --  8   KATERIN  --   --   --   --   --  9.6   * 193* 174*  --    < > 273*    < > = values in this interval not displayed.       Recent Results (from the past 24 hour(s))   XR Tibia and Fibula Left 2 Views    Narrative    TIBIA AND FIBULA LEFT 2 VIEWS  DATE/TIME: 1/10/2023 12:32 PM    INDICATION: Pain at left knee cap and below knee post fall.    COMPARISON: None available.       Impression    IMPRESSION: Anatomic alignment left tibia and fibula. No acute  displaced tibia or fibula fracture identified. Degenerative arthrosis  left knee. Moderate to severe mid to distal leg and ankle soft tissue  swelling. Talonavicular osteoarthritis with dorsal spurring.     Medications       acetaminophen  975 mg Oral Q8H     amLODIPine  10 mg Oral Daily     gabapentin  900 mg Oral BID     insulin aspart  1-10 Units Subcutaneous TID AC     insulin aspart  1-7 " Units Subcutaneous At Bedtime     insulin aspart prot & aspart  50 Units Subcutaneous BID AC     insulin glargine  32 Units Subcutaneous QAM     levothyroxine  100 mcg Oral Daily     lisinopril  30 mg Oral Daily

## 2023-01-10 NOTE — CONSULTS
Neurosurgery Consult    HPI    Ms. Kolb is a 77-year-old female with a history of lumbar surgery in 2010 as follows.    Operations/Procedures:   1. L2-L3 hemilaminectomy and diskectomy.   2. L3-L4-L5 laminectomy and foraminotomies bilaterally with partial facetectomy.    3. L4-L5 posterior nonsegmental instrumented spinal fusion with pedicle screws. 4. Posterolateral arthrodesis L4-L5 with local laminectomy bone graft and infused BMP.   5. Open reduction spondylolisthesis, grade 1, L4-L5.   6. Dural tear repair.   7. Use of operative microscope for dural tear repair, hemilaminotomy and laminectomy and laminectomy procedures.    Postoperatively she had issues with wound healing, and required a wound VAC.  Over the years she has had multiple injections into her back, most recently in 2016,, when she had a transforaminal left L2 3 injection.  She states that injection helped the symptoms are similar to what she is experiencing now.    Today she reports symptoms of pain in her left lateral thigh.  She does not report any pain radiating from her low back.  It is more of a focal tenderness along the lateral aspect of her mid thigh.    She also fell today in the hospital and had an injury to her left lower leg, x-ray of the fibula and tibia that was negative for fracture.    She states she presented to the hospital because her pain was getting worse over the past several days, and she cannot manage at home anymore.    Medical history  Type 2 diabetes  Hypertension  Hypothyroidism  Primary osteoarthritis of left wrist  BCC, face  Cataracts, bilateral  Sensorineural hearing loss  Dyslipidemia  Lichen sclerosus  Anxiety  Plantar fascial fibromatosis  Herniated lumbar disc  Spinal stenosis         Social history  Patient is here with her daughter.      B/P: 178/84, T: 98, P: 82, R: 16       Exam    No new imaging no acute distress  Bilateral lower extremities of 5-5 strength in hip flexion, knee flexion extension, ankle  dorsiflexion plantarflexion.  Normal with the exception of difficulty with lifting her left leg off the bed due to pain.    Reflexes 2+ patella, absent ankles    Patient's left lateral thigh is tender to palpation in the mid portion focally.    Lumbar spine nontender to palpation, midline incision noted.    Imaging    None MRI performed in the hospital was limited, and prematurely due to patient discomfort.  Results are as follows.    IMPRESSION:    1. Limited imaging consisting of only a sagittal T2 sequence. Exam was  terminated early due to patient's request.  2. Postoperative changes at L4-L5. Metal hardware at that level causes  artifact and limits evaluation.  3. Marked degenerative changes at the nonoperative levels particularly  L3-L4, L2-L3, and L1-L2. Recommend repeat imaging when the patient is  able.    Assessment    Left lateral thigh pain  Status post lumbar surgery and L4-5 fusion, laminectomies at L2-3 and L3-4    Plan:      We discussed various options with the patient, we discussed possibly imaging her left lateral thigh, the patient however would like to try another injection in the low back.  We will see if radiology is willing to do the injection based on the MRI that she currently has, we could target either the left L3-4 level of the left L4-5 given the distribution of her pain.  If her symptoms improve dramatically with the injection that helps us with the diagnosis.  If she does not get any improvement in pain in her left lateral leg with the injection, then we would recommend obtaining a lumbar MRI with sedation and further imaging of that area for evaluation of a more localized problem as a possible radiculopathy.

## 2023-01-10 NOTE — PLAN OF CARE
Orientation/Cognitive: A&O  Observation Goals (Met/ Not Met): Not met  Mobility Level/Assist Equipment: A1 GB/W, transferring to commode  Fall Risk (Y/N): Yes  Behavior Concerns: None  Pain Management: Scheduled tylenol. PRN Oxycodone & Dilaudid. Pt complains medication available is not controlling pain. Remains 10/10   Tele/VS/O2: VSS on RA  ABNL Lab/BG: B, 139  Diet: Mod Carb  Bowel/Bladder: Cont  Skin Concerns: Edema in BLE  Drains/Devices: PIV SL  Tests/Procedures for next shift: PT/SW, Lab draw  Anticipated DC date & active delays: TBD  Patient Stated Goal for Today: Pain control

## 2023-01-10 NOTE — PROGRESS NOTES
01/10/23 1326   Appointment Info   Signing Clinician's Name / Credentials (PT) Louise Chau DPT   Living Environment   People in Home alone   Current Living Arrangements house   Home Accessibility stairs to enter home;stairs within home   Number of Stairs, Main Entrance 1   Stair Railings, Main Entrance none   Number of Stairs, Within Home, Primary greater than 10 stairs   Stair Railings, Within Home, Primary none   Transportation Anticipated family or friend will provide   Living Environment Comments Pt lives alone, pt's daughter cannot provide assist   Self-Care   Usual Activity Tolerance moderate   Current Activity Tolerance poor   Equipment Currently Used at Home cane, straight;walker, standard   Fall history within last six months yes   Number of times patient has fallen within last six months 3   Activity/Exercise/Self-Care Comment Pt IND at baseline, does not use AD   General Information   Onset of Illness/Injury or Date of Surgery 01/09/23   Referring Physician Adolfo Amaya MD   Patient/Family Therapy Goals Statement (PT) Return home   Pertinent History of Current Problem (include personal factors and/or comorbidities that impact the POC) Coretta Kolb is a 77 year old female who presents with back pain   Existing Precautions/Restrictions fall   General Observations Pt supine in bed upon therapist arrival, agreeable to PT   Cognition   Affect/Mental Status (Cognition) WFL   Orientation Status (Cognition) oriented x 4   Follows Commands (Cognition) WFL   Pain Assessment   Patient Currently in Pain   (10/10 L LE)   Integumentary/Edema   Integumentary/Edema Comments Bruising on LE   Posture    Posture Protracted shoulders;Forward head position   Range of Motion (ROM)   Range of Motion ROM is WFL   Strength (Manual Muscle Testing)   Strength (Manual Muscle Testing) Deficits observed during functional mobility   Bed Mobility   Comment, (Bed Mobility) Supine to sit SBA   Transfers   Comment,  (Transfers) Sit to stand Min A   Gait/Stairs (Locomotion)   Comment, (Gait/Stairs) Not tested d/t pain and weakness   Balance   Balance Comments Fair seated and poor standing   Sensory Examination   Sensory Perception patient reports no sensory changes   Clinical Impression   Criteria for Skilled Therapeutic Intervention Yes, treatment indicated   PT Diagnosis (PT) Impaired functional mobility and gait   Influenced by the following impairments Pain, weakness, impaired balance, decreased activity tolerance   Functional limitations due to impairments Limited functional mobility requiring AD and assist   Clinical Presentation (PT Evaluation Complexity) Stable/Uncomplicated   Clinical Presentation Rationale Based on PMH, current status, and social support   Clinical Decision Making (Complexity) low complexity   Planned Therapy Interventions (PT) balance training;bed mobility training;gait training;stair training;strengthening;transfer training;progressive activity/exercise   Risk & Benefits of therapy have been explained evaluation/treatment results reviewed;care plan/treatment goals reviewed;risks/benefits reviewed;current/potential barriers reviewed;participants voiced agreement with care plan;participants included;patient;daughter   PT Total Evaluation Time   PT Eval, Low Complexity Minutes (11303) 10   Physical Therapy Goals   PT Frequency 5x/week   PT Predicted Duration/Target Date for Goal Attainment 01/17/23   PT Goals Bed Mobility;Transfers;Gait;Stairs   PT: Bed Mobility Modified independent;Supine to/from sit   PT: Transfers Supervision/stand-by assist;Sit to/from stand;Assistive device   PT: Gait Supervision/stand-by assist;Assistive device;50 feet   PT: Stairs Minimal assist;Greater than 10 stairs   Interventions   Interventions Quick Adds Therapeutic Activity   Therapeutic Activity   Therapeutic Activities: dynamic activities to improve functional performance Minutes (32807) 16   Symptoms Noted During/After  Treatment Increased pain   Treatment Detail/Skilled Intervention Pt significantly limited by pain. Pt sat EOB for 2 minutes w/ SBA, able to scoot to EOB. Pt stood for 10 seconds, reported increased pain, reported needing to sit down. Pt sat EOB, reported needing to lie down. Pt transferred to supine SBA. Pt able to boost in bed w/ Mod A x2, VCs for hand placement, pt pulling from bed rails. Pt performed APs and quad sets in supine. Therapist educated pt on safe mobility and recommendations for exercises. Therapist wrote exercises on board. Pt supine in bed at end of session, bed alarm on, all needs w/in reach, ice on LE, daughter at side, RN updated.   PT Discharge Planning   PT Plan Progress bed mob and transfers, monitor knee buckling   PT Discharge Recommendation (DC Rec) Transitional Care Facility   PT Rationale for DC Rec Pt below baseline, currently SBA for bed mob and Min A for transfers. Pt significantly limited by pain, unable to amb. Pt high falls risk. Recommend TCU to increase strength and functional mobility   PT Brief overview of current status SBA for bed mob and Min A for transfers   Total Session Time   Timed Code Treatment Minutes 16   Total Session Time (sum of timed and untimed services) 26

## 2023-01-11 ENCOUNTER — ANESTHESIA EVENT (OUTPATIENT)
Dept: SURGERY | Facility: CLINIC | Age: 78
End: 2023-01-11
Payer: MEDICARE

## 2023-01-11 ENCOUNTER — APPOINTMENT (OUTPATIENT)
Dept: OCCUPATIONAL THERAPY | Facility: CLINIC | Age: 78
End: 2023-01-11
Attending: INTERNAL MEDICINE
Payer: MEDICARE

## 2023-01-11 ENCOUNTER — ANESTHESIA (OUTPATIENT)
Dept: SURGERY | Facility: CLINIC | Age: 78
End: 2023-01-11
Payer: MEDICARE

## 2023-01-11 ENCOUNTER — APPOINTMENT (OUTPATIENT)
Dept: MRI IMAGING | Facility: CLINIC | Age: 78
End: 2023-01-11
Attending: INTERNAL MEDICINE
Payer: MEDICARE

## 2023-01-11 LAB
GLUCOSE BLDC GLUCOMTR-MCNC: 139 MG/DL (ref 70–99)
GLUCOSE BLDC GLUCOMTR-MCNC: 144 MG/DL (ref 70–99)
GLUCOSE BLDC GLUCOMTR-MCNC: 145 MG/DL (ref 70–99)
GLUCOSE BLDC GLUCOMTR-MCNC: 184 MG/DL (ref 70–99)
GLUCOSE BLDC GLUCOMTR-MCNC: 186 MG/DL (ref 70–99)
GLUCOSE BLDC GLUCOMTR-MCNC: 204 MG/DL (ref 70–99)
GLUCOSE BLDC GLUCOMTR-MCNC: 266 MG/DL (ref 70–99)

## 2023-01-11 PROCEDURE — 96376 TX/PRO/DX INJ SAME DRUG ADON: CPT

## 2023-01-11 PROCEDURE — 258N000003 HC RX IP 258 OP 636: Performed by: ANESTHESIOLOGY

## 2023-01-11 PROCEDURE — G0378 HOSPITAL OBSERVATION PER HR: HCPCS

## 2023-01-11 PROCEDURE — 97110 THERAPEUTIC EXERCISES: CPT | Mod: GO | Performed by: OCCUPATIONAL THERAPIST

## 2023-01-11 PROCEDURE — 82962 GLUCOSE BLOOD TEST: CPT

## 2023-01-11 PROCEDURE — 250N000011 HC RX IP 250 OP 636: Performed by: INTERNAL MEDICINE

## 2023-01-11 PROCEDURE — 250N000011 HC RX IP 250 OP 636: Performed by: ANESTHESIOLOGY

## 2023-01-11 PROCEDURE — 97530 THERAPEUTIC ACTIVITIES: CPT | Mod: GO | Performed by: OCCUPATIONAL THERAPIST

## 2023-01-11 PROCEDURE — 99232 SBSQ HOSP IP/OBS MODERATE 35: CPT | Performed by: INTERNAL MEDICINE

## 2023-01-11 PROCEDURE — 999N000141 HC STATISTIC PRE-PROCEDURE NURSING ASSESSMENT

## 2023-01-11 PROCEDURE — 710N000009 HC RECOVERY PHASE 1, LEVEL 1, PER MIN

## 2023-01-11 PROCEDURE — 250N000013 HC RX MED GY IP 250 OP 250 PS 637: Performed by: STUDENT IN AN ORGANIZED HEALTH CARE EDUCATION/TRAINING PROGRAM

## 2023-01-11 PROCEDURE — 72148 MRI LUMBAR SPINE W/O DYE: CPT | Mod: MF

## 2023-01-11 PROCEDURE — 250N000013 HC RX MED GY IP 250 OP 250 PS 637: Performed by: INTERNAL MEDICINE

## 2023-01-11 PROCEDURE — 97166 OT EVAL MOD COMPLEX 45 MIN: CPT | Mod: GO | Performed by: OCCUPATIONAL THERAPIST

## 2023-01-11 RX ORDER — FENTANYL CITRATE 0.05 MG/ML
25 INJECTION, SOLUTION INTRAMUSCULAR; INTRAVENOUS EVERY 5 MIN PRN
Status: DISCONTINUED | OUTPATIENT
Start: 2023-01-11 | End: 2023-01-11 | Stop reason: HOSPADM

## 2023-01-11 RX ORDER — LIDOCAINE 40 MG/G
CREAM TOPICAL
Status: DISCONTINUED | OUTPATIENT
Start: 2023-01-11 | End: 2023-01-11 | Stop reason: HOSPADM

## 2023-01-11 RX ORDER — HYDROMORPHONE HYDROCHLORIDE 1 MG/ML
0.3 INJECTION, SOLUTION INTRAMUSCULAR; INTRAVENOUS; SUBCUTANEOUS
Status: DISCONTINUED | OUTPATIENT
Start: 2023-01-11 | End: 2023-01-14 | Stop reason: HOSPADM

## 2023-01-11 RX ORDER — SODIUM CHLORIDE, SODIUM LACTATE, POTASSIUM CHLORIDE, CALCIUM CHLORIDE 600; 310; 30; 20 MG/100ML; MG/100ML; MG/100ML; MG/100ML
INJECTION, SOLUTION INTRAVENOUS CONTINUOUS
Status: DISCONTINUED | OUTPATIENT
Start: 2023-01-11 | End: 2023-01-11 | Stop reason: HOSPADM

## 2023-01-11 RX ORDER — CEFTRIAXONE 2 G/1
2 INJECTION, POWDER, FOR SOLUTION INTRAMUSCULAR; INTRAVENOUS EVERY 24 HOURS
Status: COMPLETED | OUTPATIENT
Start: 2023-01-11 | End: 2023-01-12

## 2023-01-11 RX ORDER — FENTANYL CITRATE 0.05 MG/ML
50 INJECTION, SOLUTION INTRAMUSCULAR; INTRAVENOUS EVERY 5 MIN PRN
Status: DISCONTINUED | OUTPATIENT
Start: 2023-01-11 | End: 2023-01-11 | Stop reason: HOSPADM

## 2023-01-11 RX ORDER — ONDANSETRON 2 MG/ML
4 INJECTION INTRAMUSCULAR; INTRAVENOUS EVERY 30 MIN PRN
Status: DISCONTINUED | OUTPATIENT
Start: 2023-01-11 | End: 2023-01-11 | Stop reason: HOSPADM

## 2023-01-11 RX ORDER — ONDANSETRON 4 MG/1
4 TABLET, ORALLY DISINTEGRATING ORAL EVERY 30 MIN PRN
Status: DISCONTINUED | OUTPATIENT
Start: 2023-01-11 | End: 2023-01-11 | Stop reason: HOSPADM

## 2023-01-11 RX ORDER — OXYCODONE HYDROCHLORIDE 5 MG/1
10 TABLET ORAL EVERY 4 HOURS PRN
Status: DISCONTINUED | OUTPATIENT
Start: 2023-01-11 | End: 2023-01-14 | Stop reason: HOSPADM

## 2023-01-11 RX ORDER — LABETALOL HYDROCHLORIDE 5 MG/ML
10 INJECTION, SOLUTION INTRAVENOUS
Status: DISCONTINUED | OUTPATIENT
Start: 2023-01-11 | End: 2023-01-11 | Stop reason: HOSPADM

## 2023-01-11 RX ADMIN — OXYCODONE HYDROCHLORIDE 10 MG: 5 TABLET ORAL at 20:22

## 2023-01-11 RX ADMIN — LEVOTHYROXINE SODIUM 100 MCG: 100 TABLET ORAL at 09:28

## 2023-01-11 RX ADMIN — FENTANYL CITRATE 25 MCG: 50 INJECTION, SOLUTION INTRAMUSCULAR; INTRAVENOUS at 19:08

## 2023-01-11 RX ADMIN — CEFTRIAXONE SODIUM 2 G: 2 INJECTION, POWDER, FOR SOLUTION INTRAMUSCULAR; INTRAVENOUS at 15:10

## 2023-01-11 RX ADMIN — INSULIN GLARGINE 32 UNITS: 100 INJECTION, SOLUTION SUBCUTANEOUS at 09:32

## 2023-01-11 RX ADMIN — ACETAMINOPHEN 975 MG: 325 TABLET, FILM COATED ORAL at 09:28

## 2023-01-11 RX ADMIN — SODIUM CHLORIDE, POTASSIUM CHLORIDE, SODIUM LACTATE AND CALCIUM CHLORIDE: 600; 310; 30; 20 INJECTION, SOLUTION INTRAVENOUS at 17:13

## 2023-01-11 RX ADMIN — LISINOPRIL 30 MG: 20 TABLET ORAL at 09:28

## 2023-01-11 RX ADMIN — ACETAMINOPHEN 975 MG: 325 TABLET, FILM COATED ORAL at 19:06

## 2023-01-11 RX ADMIN — HYDROMORPHONE HYDROCHLORIDE 0.2 MG: 0.2 INJECTION, SOLUTION INTRAMUSCULAR; INTRAVENOUS; SUBCUTANEOUS at 01:28

## 2023-01-11 RX ADMIN — AMLODIPINE BESYLATE 10 MG: 10 TABLET ORAL at 09:28

## 2023-01-11 RX ADMIN — GABAPENTIN 900 MG: 300 CAPSULE ORAL at 09:28

## 2023-01-11 RX ADMIN — ACETAMINOPHEN 975 MG: 325 TABLET, FILM COATED ORAL at 01:28

## 2023-01-11 RX ADMIN — GABAPENTIN 900 MG: 300 CAPSULE ORAL at 19:49

## 2023-01-11 ASSESSMENT — ACTIVITIES OF DAILY LIVING (ADL)
ADLS_ACUITY_SCORE: 47
ADLS_ACUITY_SCORE: 40
ADLS_ACUITY_SCORE: 48
ADLS_ACUITY_SCORE: 40
ADLS_ACUITY_SCORE: 41
ADLS_ACUITY_SCORE: 43
PREVIOUS_RESPONSIBILITIES: MEAL PREP;HOUSEKEEPING;LAUNDRY;SHOPPING;MEDICATION MANAGEMENT;FINANCES;DRIVING
ADLS_ACUITY_SCORE: 40
ADLS_ACUITY_SCORE: 43
ADLS_ACUITY_SCORE: 47
ADLS_ACUITY_SCORE: 41
ADLS_ACUITY_SCORE: 48
ADLS_ACUITY_SCORE: 41

## 2023-01-11 NOTE — ANESTHESIA PREPROCEDURE EVALUATION
Anesthesia Pre-Procedure Evaluation    Patient: Coretta Kolb   MRN: [id number] : 1945        Procedure : Procedure(s):  ANESTHESIA OUT OF OR LUMBAR MRI          Past Medical History:   Diagnosis Date     Diabetes (H)      Hypertension      Hypothyroid       Past Surgical History:   Procedure Laterality Date     ORTHOPEDIC SURGERY        Allergies   Allergen Reactions     Hydrocodone-Acetaminophen      constipated      Social History     Tobacco Use     Smoking status: Never     Smokeless tobacco: Not on file   Substance Use Topics     Alcohol use: Yes     Comment: occ      Wt Readings from Last 1 Encounters:   23 110.2 kg (243 lb)        Anesthesia Evaluation            ROS/MED HX  ENT/Pulmonary:     (+) LUIS risk factors, hypertension, obese,  (-) sleep apnea   Neurologic:       Cardiovascular:     (+) hypertension-----    METS/Exercise Tolerance:     Hematologic:       Musculoskeletal: Comment: Intractable left lower back and left lateral thigh pain.(Likely L2-4 radiculopathy)  History of L2-L3 hemilaminectomy and discectomy, L3-5 laminectomy and foraminotomies bilaterally and L4-5 spinal fusion in 2010      GI/Hepatic:    (-) GERD   Renal/Genitourinary:       Endo:     (+) type II DM, Using insulin, thyroid problem, hypothyroidism, Obesity,     Psychiatric/Substance Use:       Infectious Disease:       Malignancy:       Other:            Physical Exam    Airway        Mallampati: II   TM distance: > 3 FB   Neck ROM: full   Mouth opening: > 3 cm    Respiratory Devices and Support         Dental  no notable dental history         Cardiovascular   cardiovascular exam normal          Pulmonary   pulmonary exam normal                OUTSIDE LABS:  CBC:   Lab Results   Component Value Date    WBC 10.0 01/10/2023    WBC 12.8 (H) 2023    HGB 12.7 01/10/2023    HGB 14.3 2023    HCT 39.0 01/10/2023    HCT 42.8 2023     01/10/2023     2023     BMP:   Lab  Results   Component Value Date     01/09/2023     11/30/2022    POTASSIUM 4.1 01/09/2023    POTASSIUM 4.6 11/30/2022    CHLORIDE 103 01/09/2023    CHLORIDE 103 11/30/2022    CO2 23 01/09/2023    CO2 23 11/30/2022    BUN 22 01/09/2023    BUN 18 11/30/2022    CR 0.62 01/09/2023    CR 0.67 11/30/2022     (H) 01/11/2023     (H) 01/11/2023     COAGS: No results found for: PTT, INR, FIBR  POC: No results found for: BGM, HCG, HCGS  HEPATIC:   Lab Results   Component Value Date    ALBUMIN 3.0 (L) 11/30/2022    PROTTOTAL 7.5 11/30/2022    ALT 27 11/30/2022    AST 35 11/30/2022    ALKPHOS 114 11/30/2022    BILITOTAL 0.5 11/30/2022     OTHER:   Lab Results   Component Value Date    A1C 9.8 (H) 11/30/2022    KATERIN 9.6 01/09/2023       Anesthesia Plan    ASA Status:  3   NPO Status:  NPO Appropriate    Anesthesia Type: General.     - Airway: ETT   Induction: Intravenous.   Maintenance: Balanced.   Techniques and Equipment:     - Airway: Video-Laryngoscope         Consents    Anesthesia Plan(s) and associated risks, benefits, and realistic alternatives discussed. Questions answered and patient/representative(s) expressed understanding.    - Discussed:     - Discussed with:  Patient         Postoperative Care    Pain management: IV analgesics.   PONV prophylaxis: Ondansetron (or other 5HT-3)     Comments:                BOZENA HARDIN MD

## 2023-01-11 NOTE — PROGRESS NOTES
01/11/23 1420   Appointment Info   Signing Clinician's Name / Credentials (OT) Andriy Alston OTR/L   Living Environment   People in Home alone   Current Living Arrangements house   Home Accessibility stairs to enter home;stairs within home   Number of Stairs, Main Entrance 1   Stair Railings, Main Entrance none   Number of Stairs, Within Home, Primary greater than 10 stairs   Stair Railings, Within Home, Primary none   Transportation Anticipated family or friend will provide   Living Environment Comments Pt. lives alone in two level condo. Tub shower. Bedrooms and bath upstairs. Kitchen downstairs. Railing on landing at top of stairs was removwd several years ago   Self-Care   Usual Activity Tolerance moderate   Current Activity Tolerance poor   Equipment Currently Used at Home cane, straight;walker, standard  (Railing on landing at top of sdta)   Fall history within last six months yes   Number of times patient has fallen within last six months 3   Activity/Exercise/Self-Care Comment Pt IND at baseline, does not use AD   Instrumental Activities of Daily Living (IADL)   Previous Responsibilities meal prep;housekeeping;laundry;shopping;medication management;finances;driving   IADL Comments IND at baseline with all IADL. Pt needs to be MOD I to return home. Daughter works daily and can help intermittently for some tasks.   General Information   Onset of Illness/Injury or Date of Surgery 01/09/23   Referring Physician Adolfo Amaya MD   Patient/Family Therapy Goal Statement (OT) return home   Additional Occupational Profile Info/Pertinent History of Current Problem Coretta Kolb is a 77 year old female who presents with back pain.     Intractable left lower back and left lateral thigh pain.(Likely L2-4 radiculopathy)  History of L2-L3 hemilaminectomy and discectomy, L3-5 laminectomy and foraminotomies bilaterally and L4-5 spinal fusion in November 2010   Existing Precautions/Restrictions fall   Cognitive  Status Examination   Orientation Status orientation to person, place and time   Cognitive Status Comments Pt. very talkative with racing thoughts at times.   Pain Assessment   Patient Currently in Pain Yes, see Vital Sign flowsheet  (10/10 L LE thigh area)   Range of Motion Comprehensive   General Range of Motion bilateral upper extremity ROM WFL   Comment, General Range of Motion decreased LE ROM due to pain   Strength Comprehensive (MMT)   Comment, General Manual Muscle Testing (MMT) Assessment B UE WFL. Decreased L LE strength limited for ADL mobility   Bed Mobility   Bed Mobility rolling right;supine-sit   Rolling Right Lander (Bed Mobility) moderate assist (50% patient effort)   Supine-Sit Lander (Bed Mobility) moderate assist (50% patient effort)   Assistive Device (Bed Mobility) bed rails   Comment (Bed Mobility) Significant pain limitations   Transfers   Transfer Comments Unalbe to complete   Activities of Daily Living   BADL Assessment/Intervention lower body dressing;upper body dressing   Upper Body Dressing Assessment/Training   Lander Level (Upper Body Dressing) moderate assist (50% patient effort)   Lower Body Dressing Assessment/Training   Lander Level (Lower Body Dressing) dependent (less than 25% patient effort)   Clinical Impression   Criteria for Skilled Therapeutic Interventions Met (OT) Yes, treatment indicated   OT Diagnosis Decreased functionbal mobility for ADL   OT Problem List-Impairments impacting ADL problems related to;activity tolerance impaired;cognition;pain   Assessment of Occupational Performance 3-5 Performance Deficits   Identified Performance Deficits Dressing, g/h, toileting, IADL   Planned Therapy Interventions (OT) ADL retraining;IADL retraining;progressive activity/exercise;risk factor education   Clinical Decision Making Complexity (OT) moderate complexity   Risk & Benefits of therapy have been explained evaluation/treatment results reviewed;care  plan/treatment goals reviewed;risks/benefits reviewed;current/potential barriers reviewed;participants voiced agreement with care plan;participants included;patient;daughter   OT Total Evaluation Time   OT Eval, Moderate Complexity Minutes (34068) 15   OT Goals   Therapy Frequency (OT) 5 times/wk   OT Predicted Duration/Target Date for Goal Attainment 01/18/23   OT Goals Hygiene/Grooming;Upper Body Dressing;Lower Body Dressing;Toilet Transfer/Toileting;Cognition   OT: Hygiene/Grooming supervision/stand-by assist;while standing   OT: Upper Body Dressing Modified independent   OT: Lower Body Dressing Supervision/stand-by assist;using adaptive equipment   OT: Toilet Transfer/Toileting Supervision/stand-by assist;cleaning and garment management;toilet transfer   OT: Cognitive Patient/caregiver will verbalize understanding of cognitive assessment results/recommendations as needed for safe discharge planning   Interventions   Interventions Quick Adds Self-Care/Home Management;Therapeutic Activity;Therapeutic Procedures/Exercise   Therapeutic Procedures/Exercise   Therapeutic Procedure: strength, endurance, ROM, flexibillity minutes (06841) 10   Symptoms Noted During/After Treatment fatigue   Treatment Detail/Skilled Intervention Pt enaged in B UE strengthening acvitiy from elevated HOB. Bed at 40 degree incline. Pt. Completed Three sets x 10 of B shoulder protraction and retraction. Completed 3 sets x 10 shldr elevation. Pt indicating that UE ther ex in supported sitting was not worsening pain in leg.   Therapeutic Activities   Therapeutic Activity Minutes (95699) 15   Symptoms noted during/after treatment increased pain;fatigue   Treatment Detail/Skilled Intervention Eval completged and daughter present. Pt. enaged in bed mobility from supine to EOB. MOD A to roll to left side and with VCs for hand placement pt. pushed to sit with MIn A. Pt sat self supported with B UE. UNable toprogress with mobiility or reaching.  Return to supine with MOD A. Pt. and daughter enaged in instruction on safety for return home. Pt/ and daughter with mutiple questions. Due to lack of hand rails on stairs and cluttered living space, pt is at greater risk for falls and difficulty with home mobility.   OT Discharge Planning   OT Plan COG screen SLUMS, G/H EOB or b/s chair. Toilet transfer to b/s commode   OT Discharge Recommendation (DC Rec) Transitional Care Facility   OT Rationale for DC Rec Pt. is significantly below baseline LOF with pain limiting  OOB and seated activity. Per daughter. pt's home is multilevel and cluttered extensively. Pt. had marginal ability to complete ADL/IADL consistenelty at home and ability has deteiorated over the past several years. Pt demonstrates potential to regain mobility and activity tolerance via IP OT and TCU OT for ADl/IADL IND.   OT Brief overview of current status PT coud not tolerated EOB sit for more than 3 minutes due to L LE pain   Total Session Time   Timed Code Treatment Minutes 25   Total Session Time (sum of timed and untimed services) 40

## 2023-01-11 NOTE — PLAN OF CARE
Orientation/Cognitive: A&O; forgetful.   Observation Goals (Met/ Not Met): Not met  Mobility Level/Assist Equipment: A2 GB/W. Pt had fall this morning due to L leg giving out on her.   Fall Risk (Y/N): Yes  Behavior Concerns: None  Pain Management: Scheduled tylenol and ice given  Tele/VS/O2: VSS on RA  ABNL Lab/BG: B --> 80 --> 112  Diet: Mod Carb  Bowel/Bladder: Urinary frequency. Purewick in place.   Skin Concerns: Edema in BLE; small abrasion to L knee post fall.   Drains/Devices: PIV SL  Tests/Procedures for next shift: epidural steroid injection tomorrow.   Anticipated DC date & active delays: TBD; will need TCU at discharge.   Patient Stated Goal for Today: Pain control

## 2023-01-11 NOTE — PLAN OF CARE
Goal Outcome Evaluation:                      Orientation/Cognitive: A&O; forgetful.   Observation Goals (Met/ Not Met): Not met  Mobility Level/Assist Equipment: A2 GB/W.  Fall Risk (Y/N): Yes  Behavior Concerns: None  Pain Management: Scheduled tylenol and diladed  Tele/VS/O2: VSS on RA  ABNL Lab/BG: See Chart  Diet: Mod Carb  Bowel/Bladder: Urinary frequency. Purewick in place.   Skin Concerns: Edema in BLE; small abrasion to L knee post fall.   Drains/Devices: PIV SL  Tests/Procedures for next shift: epidural steroid injection today   Anticipated DC date & active delays: TBD; will need TCU at discharge.   Patient Stated Goal for Today: Pain control           Observation goals  PRIOR TO DISCHARGE        Comments:   -diagnostic tests and consults completed and resulted: Not met   -vital signs normal or at patient baseline: Not met  -adequate pain control on oral analgesics: Not met  Nurse to notify provider when observation goals have been met and patient is ready for discharge.

## 2023-01-11 NOTE — PLAN OF CARE
Observation goals  PRIOR TO DISCHARGE          -diagnostic tests and consults completed and resulted: Not met   -vital signs normal or at patient baseline: Partially met  -adequate pain control on oral analgesics: Not met    Nurse to notify provider when observation goals have been met and patient is ready for discharge.

## 2023-01-11 NOTE — PROGRESS NOTES
[facility]  Neurosurgery Daily Progress Note    Assessment & Plan   77F with history of L4-5 fusion and L2-4 laminectomies in 2010, presented with left lateral thigh pain. NSG recommended CHANTAL. IR recommended new MRI prior to CHANTAL. MRI has been ordered with sedation but not completed yet. Patient reports continued left lateral thigh pain. Denies back pain, numbness, weakness, or other new/worsening symptoms.     MRI LUMBAR SPINE WITHOUT CONTRAST January 9, 2023 1:53 PM   IMPRESSION:    1. Limited imaging consisting of only a sagittal T2 sequence. Exam was  terminated early due to patient's request.  2. Postoperative changes at L4-L5. Metal hardware at that level causes  artifact and limits evaluation.  3. Marked degenerative changes at the nonoperative levels particularly  L3-L4, L2-L3, and L1-L2. Recommend repeat imaging when the patient is  able.    Plan:  - Lumbar MRI with sedation - to be completed today  - Once MRI is completed, will plan for CHANTAL with IR   - Continue supportive and symptomatic treatment  - PT/OT  - Continue pain control measures   - Appreciate assistance from specialties      Mable Meyer CNP  [facility] [place] [address]  Tel [phone]  Pager [phone]    Interval History   Continued left leg pain     Physical Exam   Temp: 97.4  F (36.3  C) Temp src: Axillary BP: (!) 147/81 Pulse: 76   Resp: 18 SpO2: 96 % O2 Device: None (Room air)    Vitals:    01/09/23 2226   Weight: 110.2 kg (243 lb)     Vital Signs with Ranges  Temp:  [97.4  F (36.3  C)-98.5  F (36.9  C)] 97.4  F (36.3  C)  Pulse:  [76-83] 76  Resp:  [16-18] 18  BP: (138-178)/(56-84) 147/81  SpO2:  [94 %-97 %] 96 %  I/O last 3 completed shifts:  In: 300 [P.O.:300]  Out: 1200 [Urine:1200]    Mental status:  Alert and oriented x 3, speech is fluent.  Motor:    Moves all extremities  RLE 5/5  Left hip flexion and knee flex/ext  limited due to pain   Bilateral DF/PF 5/5   Sensation:  Intact    Medications        acetaminophen  975 mg Oral Q8H     amLODIPine  10 mg Oral Daily     cefTRIAXone  1 g Intravenous Q24H     gabapentin  900 mg Oral BID     insulin aspart  1-10 Units Subcutaneous TID AC     insulin aspart  1-7 Units Subcutaneous At Bedtime     insulin aspart prot & aspart  50 Units Subcutaneous QAM AC     insulin glargine  32 Units Subcutaneous QAM     levothyroxine  100 mcg Oral Daily     lisinopril  30 mg Oral Daily       Mable Meyer [facility]   [address] [address]  Tel [phone]  Pager [phone]

## 2023-01-11 NOTE — PROGRESS NOTES
[facility]    Medicine Progress Note - Hospitalist Service        Date of Admission:  1/9/2023 10:44 AM    Assessment & Plan:   Coretta Kolb is a 77 year old female who presents with back pain.     Intractable left lower back and left lateral thigh pain.(Likely L2-4 radiculopathy)  History of L2-L3 hemilaminectomy and discectomy, L3-5 laminectomy and foraminotomies bilaterally and L4-5 spinal fusion in November 2010  -Patient appears to have longstanding problem with lumbar spine disease  -Underwent above surgery in 2010, with Dr. Gutierrez of [facility] and subsequently has had multiple epidural injection with last injection being left L3 transforaminal epidural steroid injection pain 2016  -Presents with left lower back pain and left lateral thigh pain(apparently similar in presentatio to previous episodes)  -MRI of the spine was attempted at presentation however was terminated early due to patient wishes due to ongoing pain.  This was limited which shows postoperative changes at L4-L5, marked degenerative changes at the nonoperative levels particularly L3-L4, L2-L3 and L1-L2   -Patient mentions that pain is very consistent with previous pains which she had relief with epidural steroid injection  -Pain control with scheduled Tylenol, as needed oxycodone and IV Dilaudid.    -Continue prior to admission Neurontin.  -Flexeril 5 mg 3 times daily as needed  -Neurosurgery consulted, they recommended epidural steroid injection at L2-L3, however radiology recommended repeating the MRI as the initial MRI was not adequate quality.  -Plan is to repeat lumbar spine MRI with sedation today  -PT recommending TCU     Insulin-dependent diabetes mellitus type 2  -Hold  NovoLog 70/30 as patient is n.p.o.  -Continue Lantus 32 units every morning  -High intensity sliding scale    ?  UTI  -Urine culture growing strep, continue Rocephin     Essential hypertension  -Continue PTA atenolol and  "lisinopril     Hypothyroidism  -Continue levothyroxine    ?  Cognitive impairment  -Daughter concerned about patient's recent memory.  -OT evaluation today          Diet: Moderate Consistent Carb (60 g CHO per Meal) Diet     DVT Prophylaxis: Pneumatic Compression Devices   Rodriguez Catheter: Not present  Code Status: Full Code     Disposition Plan       Expected Discharge Date: 01/12/2023      Destination: inpatient rehabilitation facility  Discharge Comments: Needs an MRI, unable to liyah may need sedation  Ortho following.   TCU      Entered: Adolfo Amaya MD 01/11/2023, 11:15 AM        Clinically Significant Risk Factors Present on Admission                  # Hypertension: home medication list includes antihypertensive(s)      # Severe Obesity: Estimated body mass index is 45.91 kg/m  as calculated from the following:    Height as of this encounter: 1.549 m (5' 1\").    Weight as of this encounter: 110.2 kg (243 lb).              The patient's care was discussed with the  bedside nurse and the patient.    Adolfo Amaya MD  Hospitalist Service  [facility]  Text Page 7AM-6PM  Securely message with the Vocera Web Console (learn more here)  Text page via [employer] Paging/Directory    ______________________________________________________________________    Interval History   Pain about the same.  Patient mentions that conventional pain medicine not working for her.  Plan is to repeat MRI under sedation today.  Data reviewed today: I reviewed all medications, new labs and imaging results over the last 24 hours. I personally reviewed no images or EKG's today.    Physical Exam   Vital signs:  Temp: 97.4  F (36.3  C) Temp src: Axillary BP: (!) 147/81 Pulse: 76   Resp: 18 SpO2: 96 % O2 Device: None (Room air)   Height: 154.9 cm (5' 1\") Weight: 110.2 kg (243 lb)  Estimated body mass index is 45.91 kg/m  as calculated from the following:    Height as of this encounter: 1.549 m (5' 1\").    Weight as " of this encounter: 110.2 kg (243 lb).      Wt Readings from Last 2 Encounters:   01/09/23 110.2 kg (243 lb)   11/30/22 99.8 kg (220 lb)       Gen: AAOX3, NAD, comfortable  Resp: CTA B/L, normal WOB  CVS: RRR, no murmur  Abd/GI: Soft, non-tender. BS- normoactive.    Skin: Warm, dry no rashes  MSK: Left knee with small bruise, no other areas of injury apparent.  Mild tenderness over left lateral thigh  Neuro- CN- intact.       Data   Recent Labs   Lab 01/11/23  0739 01/11/23  0214 01/10/23  2142 01/10/23  0758 01/10/23  0734 01/09/23  1824 01/09/23  1104   WBC  --   --   --   --  10.0  --  12.8*   HGB  --   --   --   --  12.7  --  14.3   MCV  --   --   --   --  89  --  89   PLT  --   --   --   --  271  --  290   NA  --   --   --   --   --   --  134   POTASSIUM  --   --   --   --   --   --  4.1   CHLORIDE  --   --   --   --   --   --  103   CO2  --   --   --   --   --   --  23   BUN  --   --   --   --   --   --  22   CR  --   --   --   --   --   --  0.62   ANIONGAP  --   --   --   --   --   --  8   KATERIN  --   --   --   --   --   --  9.6   * 145* 153*   < >  --    < > 273*    < > = values in this interval not displayed.       Recent Results (from the past 24 hour(s))   XR Tibia and Fibula Left 2 Views    Narrative    TIBIA AND FIBULA LEFT 2 VIEWS  DATE/TIME: 1/10/2023 12:32 PM    INDICATION: Pain at left knee cap and below knee post fall.    COMPARISON: None available.       Impression    IMPRESSION: Anatomic alignment left tibia and fibula. No acute  displaced tibia or fibula fracture identified. Degenerative arthrosis  left knee. Moderate to severe mid to distal leg and ankle soft tissue  swelling. Talonavicular osteoarthritis with dorsal spurring.    KRIS MAGALLANES MD         SYSTEM ID:  [id number]     Medications       acetaminophen  975 mg Oral Q8H     amLODIPine  10 mg Oral Daily     cefTRIAXone  2 g Intravenous Q24H     gabapentin  900 mg Oral BID     insulin aspart  1-10 Units Subcutaneous TID AC      insulin aspart  1-7 Units Subcutaneous At Bedtime     insulin aspart prot & aspart  50 Units Subcutaneous QAM AC     insulin glargine  32 Units Subcutaneous QAM     levothyroxine  100 mcg Oral Daily     lisinopril  30 mg Oral Daily

## 2023-01-11 NOTE — PROGRESS NOTES
Care Management Follow Up    Length of Stay (days): 0    Expected Discharge Date: 01/12/2023     Concerns to be Addressed:       Patient plan of care discussed at interdisciplinary rounds: Yes    Anticipated Discharge Disposition:       Anticipated Discharge Services:    Anticipated Discharge DME:      Patient/family educated on Medicare website which has current facility and service quality ratings:    Education Provided on the Discharge Plan:    Patient/Family in Agreement with the Plan:      Referrals Placed by CM/SW:    Private pay costs discussed: Not applicable    Additional Information:  SW received choices for TCU from patient. Choices are: [facility], [facility], and [facility]. SW sent referrals via [facility]. [facility] is patient's first choice.     SW will continue to follow.       BERENICE Silver

## 2023-01-11 NOTE — PLAN OF CARE
Orientation/Cognitive: A&O; forgetful.   Observation Goals (Met/ Not Met): Not met  Mobility Level/Assist Equipment: A2/walker/gb; had fall in room yesterday as well as fall in the ED.   Fall Risk (Y/N): Yes  Behavior Concerns: None  Pain Management: Scheduled tylenol/gabapentin. Declines use of any other medications as she states they don't work.   Tele/VS/O2: BP elevated, all other VSS, on RA  ABNL Lab/BG:  and 184. UC +.   Diet: NPO for upcoming MRI under sedation.   Bowel/Bladder: Urinary frequency. Purewick in place.   Skin Concerns: Edema in BLE; small abrasion to L knee post fall.   Drains/Devices: PIV SL  Tests/Procedures for next shift: MRI under sedation, planned for 1730   Anticipated DC date & active delays: needs complete MRI and planned epidural injection tomorrow (planned for 1130). Will need TCU placement upon discharge.   Patient Stated Goal for Today: To have testing completed.

## 2023-01-12 ENCOUNTER — APPOINTMENT (OUTPATIENT)
Dept: GENERAL RADIOLOGY | Facility: CLINIC | Age: 78
End: 2023-01-12
Attending: PHYSICIAN ASSISTANT
Payer: MEDICARE

## 2023-01-12 ENCOUNTER — APPOINTMENT (OUTPATIENT)
Dept: OCCUPATIONAL THERAPY | Facility: CLINIC | Age: 78
End: 2023-01-12
Payer: MEDICARE

## 2023-01-12 LAB
GLUCOSE BLDC GLUCOMTR-MCNC: 111 MG/DL (ref 70–99)
GLUCOSE BLDC GLUCOMTR-MCNC: 149 MG/DL (ref 70–99)
GLUCOSE BLDC GLUCOMTR-MCNC: 152 MG/DL (ref 70–99)
GLUCOSE BLDC GLUCOMTR-MCNC: 211 MG/DL (ref 70–99)
GLUCOSE BLDC GLUCOMTR-MCNC: 393 MG/DL (ref 70–99)
GLUCOSE BLDC GLUCOMTR-MCNC: 54 MG/DL (ref 70–99)
GLUCOSE BLDC GLUCOMTR-MCNC: 59 MG/DL (ref 70–99)
GLUCOSE BLDC GLUCOMTR-MCNC: 61 MG/DL (ref 70–99)

## 2023-01-12 PROCEDURE — 82962 GLUCOSE BLOOD TEST: CPT

## 2023-01-12 PROCEDURE — 250N000011 HC RX IP 250 OP 636: Performed by: PHYSICIAN ASSISTANT

## 2023-01-12 PROCEDURE — 99232 SBSQ HOSP IP/OBS MODERATE 35: CPT | Performed by: INTERNAL MEDICINE

## 2023-01-12 PROCEDURE — 62323 NJX INTERLAMINAR LMBR/SAC: CPT

## 2023-01-12 PROCEDURE — 99152 MOD SED SAME PHYS/QHP 5/>YRS: CPT

## 2023-01-12 PROCEDURE — 99153 MOD SED SAME PHYS/QHP EA: CPT

## 2023-01-12 PROCEDURE — 255N000002 HC RX 255 OP 636: Performed by: INTERNAL MEDICINE

## 2023-01-12 PROCEDURE — 250N000009 HC RX 250: Performed by: INTERNAL MEDICINE

## 2023-01-12 PROCEDURE — 999N000154 HC STATISTIC RADIOLOGY XRAY, US, CT, MAR, NM

## 2023-01-12 PROCEDURE — 96376 TX/PRO/DX INJ SAME DRUG ADON: CPT

## 2023-01-12 PROCEDURE — 72100 X-RAY EXAM L-S SPINE 2/3 VWS: CPT

## 2023-01-12 PROCEDURE — 250N000013 HC RX MED GY IP 250 OP 250 PS 637: Performed by: INTERNAL MEDICINE

## 2023-01-12 PROCEDURE — G0378 HOSPITAL OBSERVATION PER HR: HCPCS

## 2023-01-12 PROCEDURE — 250N000011 HC RX IP 250 OP 636: Performed by: INTERNAL MEDICINE

## 2023-01-12 PROCEDURE — 97535 SELF CARE MNGMENT TRAINING: CPT | Mod: GO | Performed by: OCCUPATIONAL THERAPIST

## 2023-01-12 PROCEDURE — 250N000013 HC RX MED GY IP 250 OP 250 PS 637: Performed by: STUDENT IN AN ORGANIZED HEALTH CARE EDUCATION/TRAINING PROGRAM

## 2023-01-12 RX ORDER — LIDOCAINE HYDROCHLORIDE 10 MG/ML
30 INJECTION, SOLUTION EPIDURAL; INFILTRATION; INTRACAUDAL; PERINEURAL ONCE
Status: COMPLETED | OUTPATIENT
Start: 2023-01-12 | End: 2023-01-12

## 2023-01-12 RX ORDER — NALOXONE HYDROCHLORIDE 0.4 MG/ML
0.2 INJECTION, SOLUTION INTRAMUSCULAR; INTRAVENOUS; SUBCUTANEOUS
Status: DISCONTINUED | OUTPATIENT
Start: 2023-01-12 | End: 2023-01-12

## 2023-01-12 RX ORDER — SENNOSIDES 8.6 MG
1 TABLET ORAL 2 TIMES DAILY
Status: DISCONTINUED | OUTPATIENT
Start: 2023-01-12 | End: 2023-01-14 | Stop reason: HOSPADM

## 2023-01-12 RX ORDER — GATIFLOXACIN 5 MG/ML
1 SOLUTION/ DROPS OPHTHALMIC 4 TIMES DAILY
Status: DISCONTINUED | OUTPATIENT
Start: 2023-01-12 | End: 2023-01-14 | Stop reason: HOSPADM

## 2023-01-12 RX ORDER — DEXAMETHASONE SODIUM PHOSPHATE 10 MG/ML
10 INJECTION, SOLUTION INTRAMUSCULAR; INTRAVENOUS ONCE
Status: COMPLETED | OUTPATIENT
Start: 2023-01-12 | End: 2023-01-12

## 2023-01-12 RX ORDER — FLUMAZENIL 0.1 MG/ML
0.2 INJECTION, SOLUTION INTRAVENOUS
Status: DISCONTINUED | OUTPATIENT
Start: 2023-01-12 | End: 2023-01-12

## 2023-01-12 RX ORDER — LIDOCAINE HYDROCHLORIDE 10 MG/ML
10 INJECTION, SOLUTION EPIDURAL; INFILTRATION; INTRACAUDAL; PERINEURAL ONCE
Status: COMPLETED | OUTPATIENT
Start: 2023-01-12 | End: 2023-01-12

## 2023-01-12 RX ORDER — NALOXONE HYDROCHLORIDE 0.4 MG/ML
0.4 INJECTION, SOLUTION INTRAMUSCULAR; INTRAVENOUS; SUBCUTANEOUS
Status: DISCONTINUED | OUTPATIENT
Start: 2023-01-12 | End: 2023-01-12

## 2023-01-12 RX ORDER — KETOROLAC TROMETHAMINE 5 MG/ML
1 SOLUTION OPHTHALMIC 4 TIMES DAILY
Status: DISCONTINUED | OUTPATIENT
Start: 2023-01-12 | End: 2023-01-14 | Stop reason: HOSPADM

## 2023-01-12 RX ORDER — IOPAMIDOL 408 MG/ML
10 INJECTION, SOLUTION INTRATHECAL ONCE
Status: COMPLETED | OUTPATIENT
Start: 2023-01-12 | End: 2023-01-12

## 2023-01-12 RX ORDER — POLYETHYLENE GLYCOL 3350 17 G/17G
17 POWDER, FOR SOLUTION ORAL DAILY
Status: DISCONTINUED | OUTPATIENT
Start: 2023-01-12 | End: 2023-01-14 | Stop reason: HOSPADM

## 2023-01-12 RX ORDER — PREDNISOLONE ACETATE 10 MG/ML
1 SUSPENSION/ DROPS OPHTHALMIC 4 TIMES DAILY
Status: DISCONTINUED | OUTPATIENT
Start: 2023-01-12 | End: 2023-01-14 | Stop reason: HOSPADM

## 2023-01-12 RX ORDER — FENTANYL CITRATE 0.05 MG/ML
25-50 INJECTION, SOLUTION INTRAMUSCULAR; INTRAVENOUS EVERY 5 MIN PRN
Status: DISCONTINUED | OUTPATIENT
Start: 2023-01-12 | End: 2023-01-12

## 2023-01-12 RX ADMIN — SENNOSIDES 1 TABLET: 8.6 TABLET, FILM COATED ORAL at 11:05

## 2023-01-12 RX ADMIN — PREDNISOLONE ACETATE 1 DROP: 10 SUSPENSION/ DROPS OPHTHALMIC at 16:10

## 2023-01-12 RX ADMIN — KETOROLAC TROMETHAMINE 1 DROP: 5 SOLUTION OPHTHALMIC at 16:10

## 2023-01-12 RX ADMIN — KETOROLAC TROMETHAMINE 1 DROP: 5 SOLUTION OPHTHALMIC at 20:01

## 2023-01-12 RX ADMIN — MIDAZOLAM 1 MG: 1 INJECTION INTRAMUSCULAR; INTRAVENOUS at 15:02

## 2023-01-12 RX ADMIN — SENNOSIDES 1 TABLET: 8.6 TABLET, FILM COATED ORAL at 19:58

## 2023-01-12 RX ADMIN — OXYCODONE HYDROCHLORIDE 10 MG: 5 TABLET ORAL at 11:05

## 2023-01-12 RX ADMIN — ACETAMINOPHEN 975 MG: 325 TABLET, FILM COATED ORAL at 10:27

## 2023-01-12 RX ADMIN — GABAPENTIN 900 MG: 300 CAPSULE ORAL at 19:58

## 2023-01-12 RX ADMIN — MIDAZOLAM 1 MG: 1 INJECTION INTRAMUSCULAR; INTRAVENOUS at 14:48

## 2023-01-12 RX ADMIN — PREDNISOLONE ACETATE 1 DROP: 10 SUSPENSION/ DROPS OPHTHALMIC at 20:01

## 2023-01-12 RX ADMIN — LIDOCAINE HYDROCHLORIDE 3 ML: 10 INJECTION, SOLUTION EPIDURAL; INFILTRATION; INTRACAUDAL; PERINEURAL at 15:33

## 2023-01-12 RX ADMIN — LEVOTHYROXINE SODIUM 100 MCG: 100 TABLET ORAL at 08:20

## 2023-01-12 RX ADMIN — CEFTRIAXONE SODIUM 2 G: 2 INJECTION, POWDER, FOR SOLUTION INTRAMUSCULAR; INTRAVENOUS at 16:10

## 2023-01-12 RX ADMIN — IOPAMIDOL 0.5 ML: 408 INJECTION, SOLUTION INTRATHECAL at 15:32

## 2023-01-12 RX ADMIN — DEXAMETHASONE SODIUM PHOSPHATE 20 MG: 10 INJECTION, SOLUTION INTRAMUSCULAR; INTRAVENOUS at 15:31

## 2023-01-12 RX ADMIN — GATIFLOXACIN 1 DROP: 5 SOLUTION/ DROPS OPHTHALMIC at 16:10

## 2023-01-12 RX ADMIN — LISINOPRIL 30 MG: 20 TABLET ORAL at 08:20

## 2023-01-12 RX ADMIN — ACETAMINOPHEN 975 MG: 325 TABLET, FILM COATED ORAL at 19:58

## 2023-01-12 RX ADMIN — GABAPENTIN 900 MG: 300 CAPSULE ORAL at 08:20

## 2023-01-12 RX ADMIN — AMLODIPINE BESYLATE 10 MG: 10 TABLET ORAL at 08:19

## 2023-01-12 RX ADMIN — OXYCODONE HYDROCHLORIDE 10 MG: 5 TABLET ORAL at 00:34

## 2023-01-12 RX ADMIN — FENTANYL CITRATE 100 MCG: 50 INJECTION, SOLUTION INTRAMUSCULAR; INTRAVENOUS at 14:48

## 2023-01-12 RX ADMIN — OXYCODONE HYDROCHLORIDE 10 MG: 5 TABLET ORAL at 21:11

## 2023-01-12 RX ADMIN — LIDOCAINE HYDROCHLORIDE 4 ML: 10 INJECTION, SOLUTION EPIDURAL; INFILTRATION; INTRACAUDAL; PERINEURAL at 15:30

## 2023-01-12 RX ADMIN — INSULIN GLARGINE 32 UNITS: 100 INJECTION, SOLUTION SUBCUTANEOUS at 08:23

## 2023-01-12 RX ADMIN — ACETAMINOPHEN 975 MG: 325 TABLET, FILM COATED ORAL at 02:53

## 2023-01-12 RX ADMIN — GATIFLOXACIN 1 DROP: 5 SOLUTION/ DROPS OPHTHALMIC at 20:01

## 2023-01-12 ASSESSMENT — ACTIVITIES OF DAILY LIVING (ADL)
ADLS_ACUITY_SCORE: 47
ADLS_ACUITY_SCORE: 43
ADLS_ACUITY_SCORE: 47
ADLS_ACUITY_SCORE: 47

## 2023-01-12 NOTE — PLAN OF CARE
Patient currently still off the floor for MRI    Orientation/Cognitive: A&O; forgetful.   Observation Goals (Met/ Not Met): Not met  Mobility Level/Assist Equipment: A2 GB/W  Fall Risk (Y/N): Yes  Behavior Concerns: None  Pain Management: Scheduled tylenol and heat packs  Tele/VS/O2: VSS on RA  ABNL Lab/BG: B  Diet: Mod Carb  Bowel/Bladder: Urinary frequency. Purewick in place.   Skin Concerns: Edema in BLE; small abrasion to L knee post fall.   Drains/Devices: PIV SL  Tests/Procedures for next shift: epidural steroid injection tomorrow.   Anticipated DC date & active delays: TBD; will need TCU at discharge.   Patient Stated Goal for Today:

## 2023-01-12 NOTE — PROGRESS NOTES
[facility]    Medicine Progress Note - Hospitalist Service        Date of Admission:  1/9/2023 10:44 AM    Assessment & Plan:   Coretta Kolb is a 77 year old female who presents with back pain.     Intractable left lower back and left lateral thigh pain.(Likely L2-4 radiculopathy)  Severe spinal canal stenosis at L3-L4, and severe bilateral neural foraminal stenosis at L2-L3  History of L2-L3 hemilaminectomy and discectomy, L3-5 laminectomy and foraminotomies bilaterally and L4-5 spinal fusion in November 2010  -Patient appears to have longstanding problem with lumbar spine disease  -Underwent above surgery in 2010, with Dr. Gutierrez of [facility] and subsequently has had multiple epidural injection with last injection being left L3 transforaminal epidural steroid injection pain 2016  -Presents with left lower back pain and left lateral thigh pain(apparently similar in presentatio to previous episodes)  -Initial MRI of the spine was not a good quality study, subsequently she had repeat MRI of the lumbar spine under sedation on 1/11 which shows severe spinal canal stenosis at L3-L4 again on a limited exam and severe bilateral neural foraminal stenosis at L2-L3  -Patient mentions that pain is very consistent with previous pains which she had relief with epidural steroid injection  -Continue prior to admission Neurontin.  -Flexeril 5 mg 3 times daily as needed  -Neurosurgery consulted, they recommended epidural steroid injection which is planned for this afternoon with the help of IR  -PT recommending TCU     Insulin-dependent diabetes mellitus type 2  -Continue Lantus 32 units every morning  -Patient had low blood sugars yesterday, therefore we will skip evening dose of 70/30 tonight, INR reassess tomorrow  -Continue 70/30 at 50 units in the morning  -High intensity sliding scale    ?  UTI  -Continue Rocephin for 1 more day today and likely stop     Essential hypertension  -Continue PTA  "atenolol and lisinopril     Hypothyroidism  -Continue levothyroxine    ?  Cognitive impairment  -Daughter concerned about patient's recent memory.  -OT following, awaiting cognitive eval      Diet: Moderate Consistent Carb (60 g CHO per Meal) Diet     DVT Prophylaxis: Pneumatic Compression Devices   Rodriguez Catheter: Not present  Code Status: Full Code     Disposition Plan       Expected Discharge Date: 01/13/2023      Destination: inpatient rehabilitation facility  Discharge Comments: CHANTAL today.   Ortho following.   [facility]      Entered: Adolfo Amaya MD 01/12/2023, 10:40 AM        Clinically Significant Risk Factors Present on Admission                  # Hypertension: home medication list includes antihypertensive(s)      # Severe Obesity: Estimated body mass index is 45.91 kg/m  as calculated from the following:    Height as of this encounter: 1.549 m (5' 1\").    Weight as of this encounter: 110.2 kg (243 lb).              The patient's care was discussed with the  bedside nurse and the patient.    Adolfo Amaya MD  Hospitalist Service  [facility]  Text Page 7AM-6PM  Securely message with the Vocera Web Console (learn more here)  Text page via [employer] Paging/Directory    ______________________________________________________________________    Interval History   Remains essentially about the same.  Patient underwent MRI under sedation yesterday and is found to have severe spinal canal stenosis and bilateral foraminal stenosis at L2-L3.  Plan for epidural steroid injection today.    Data reviewed today: I reviewed all medications, new labs and imaging results over the last 24 hours. I personally reviewed no images or EKG's today.    Physical Exam   Vital signs:  Temp: 97.8  F (36.6  C) Temp src: Oral BP: (!) 146/74 Pulse: 71   Resp: 18 SpO2: 97 % O2 Device: None (Room air) Oxygen Delivery: 2 LPM Height: 154.9 cm (5' 1\") Weight: 110.2 kg (243 lb)  Estimated body mass " "index is 45.91 kg/m  as calculated from the following:    Height as of this encounter: 1.549 m (5' 1\").    Weight as of this encounter: 110.2 kg (243 lb).      Wt Readings from Last 2 Encounters:   01/09/23 110.2 kg (243 lb)   11/30/22 99.8 kg (220 lb)       Gen: AAOX3, NAD, comfortable  Resp: CTA B/L, normal WOB  CVS: RRR, no murmur  Abd/GI: Soft, non-tender. BS- normoactive.    MSK: Mild tenderness over left lateral thigh with unchanged exam.  Neuro- CN- intact.       Data   Recent Labs   Lab 01/12/23  0750 01/12/23  0215 01/11/23  2133 01/10/23  0758 01/10/23  0734 01/09/23  1824 01/09/23  1104   WBC  --   --   --   --  10.0  --  12.8*   HGB  --   --   --   --  12.7  --  14.3   MCV  --   --   --   --  89  --  89   PLT  --   --   --   --  271  --  290   NA  --   --   --   --   --   --  134   POTASSIUM  --   --   --   --   --   --  4.1   CHLORIDE  --   --   --   --   --   --  103   CO2  --   --   --   --   --   --  23   BUN  --   --   --   --   --   --  22   CR  --   --   --   --   --   --  0.62   ANIONGAP  --   --   --   --   --   --  8   KATERIN  --   --   --   --   --   --  9.6   * 211* 266*   < >  --    < > 273*    < > = values in this interval not displayed.       Recent Results (from the past 24 hour(s))   MR Lumbar Spine w/o Contrast    Narrative    EXAM: MR LUMBAR SPINE W/O CONTRAST  LOCATION: [facility]  DATE/TIME: 1/11/2023 6:29 PM    INDICATION: Low back pain; Low back pain, no complicating feature; No chronic LBP duration >= 3 months  COMPARISON: 10/12/2010  TECHNIQUE: Routine Lumbar Spine MRI without IV contrast.    FINDINGS:   Nomenclature is based on 5 lumbar type vertebral bodies. There are interval postsurgical changes including posterior spinal fusion at L4-L5. Extensive streak artifact from surgical hardware at these levels limits evaluation. There is 2 to 3 mm   retrolisthesis of L1 on L2 and L2 on L3. Normal vertebral body heights and marrow signal. Normal distal " spinal cord and cauda equina with conus medullaris at T12-L1. No extraspinal abnormality. Unremarkable visualized bony pelvis.    T12-L1: There is disc desiccation and mild diffuse disc bulge. Bilateral facet hypertrophy. No spinal canal or neural foraminal stenosis.     L1-L2: There is disc desiccation and diffuse disc bulge. Bilateral facet hypertrophy. Mild spinal canal stenosis. Moderate bilateral neural foraminal stenosis. Mild to moderate bilateral subarticular stenosis.    L2-L3: There is disc desiccation and diffuse disc bulge. Bilateral ligament flavum and facet hypertrophy. Mild spinal canal stenosis. Severe bilateral neural foraminal stenosis with disc material contacting and possibly compressing the exiting bilateral   L2 nerve roots. Moderate bilateral subarticular stenosis.     L3-L4: Limited evaluation secondary to streak artifact. There is diffuse disc bulge. Suspect severe spinal canal stenosis.    L4-L5: Not visualized secondary to streak artifact.    L5-S1: Limited evaluation secondary to streak artifact. There is diffuse disc bulge. There is bilateral facet hypertrophy. Suspect mild spinal canal stenosis. Neural foramen not well evaluated.      Impression    IMPRESSION:  1.  Interval postsurgical changes. Extensive streak artifact limits evaluation.  2.  Multilevel disc degeneration and facet hypertrophy as described above. Mild spinal canal stenosis at L1-L2 and L2-L3. Suspect severe spinal canal stenosis at L3-L4 on this is limited in evaluation.  3.  Severe bilateral neural foraminal stenoses at L2-L3.     Medications       acetaminophen  975 mg Oral Q8H     amLODIPine  10 mg Oral Daily     cefTRIAXone  2 g Intravenous Q24H     gabapentin  900 mg Oral BID     insulin aspart  1-10 Units Subcutaneous TID AC     insulin aspart  1-7 Units Subcutaneous At Bedtime     insulin aspart prot & aspart  50 Units Subcutaneous QAM AC     insulin glargine  32 Units Subcutaneous QAM     levothyroxine  100 mcg  Oral Daily     lisinopril  30 mg Oral Daily     polyethylene glycol  17 g Oral Daily     sennosides  1 tablet Oral BID

## 2023-01-12 NOTE — PROGRESS NOTES
Care Suites Procedure Nursing Note    Patient Information  Name: Coretta Kolb  Age: 77 year old    Procedure  Procedure: lumbar steroid injection  Procedure start time: 1445  Procedure complete time: 1540  Concerns/abnormal assessment: Pt had episode of hypotension upon completion of procedure. Pt was in prone position and was lethargic but speaking to staff entire time. Pt transferred into trendelenburg/supine position and blood pressure was restored to pre-procedure baseline with no other intervention. Pt comfortable and VSS on 2L NC at time of transfer.   If abnormal assessment, provider notified: Yes - Edgar ALVARADO at bedside throughout procedure and witnessed  Hypotension post-procedure. Provider stayed at bedside until blood pressure was restored to baseline and pt was at her baseline.   Plan/Other: Detailed report given to short stay RN and all questions answered. Pt transferred to room 5523 per transport. No complaints at time of transport.     Jyotsna Fair, RN

## 2023-01-12 NOTE — PLAN OF CARE
Orientation/Cognitive: A&Ox4, forgetful.   Observation Goals (Met/ Not Met): Not met  Mobility Level/Assist: A2/walker/gb  Fall Risk (Y/N): Y  Behavior Concerns: None  Pain Management: Always rates pain 10/10, falls right back to sleep before returning with meds.  Oxycodone 10mg PRN, scheduled tylenol and gabapentin, heat packs  Tele/VS/O2: VSS on RA ex HTN  ABNL Lab/BG:   Diet: Mod CHO  Bowel/Bladder: Incontinent, purewick overnight.  Skin Concerns: BLE edema, L knee abrasion  Drains/Devices: PIV SL  Tests/Procedures for next shift: plan for CHANTAL by neurosurg.   Anticipated DC date & active delays: TBD pending improvement, CHANTAL, and placement to TCU.  Patient Stated Goal for Today: pain control    Observation goals  PRIOR TO DISCHARGE        Comments:   -diagnostic tests and consults completed and resulted- NOT MET   -vital signs normal or at patient baseline- NOT MET   -adequate pain control on oral analgesics- NOT MET   Nurse to notify provider when observation goals have been met and patient is ready for discharge.

## 2023-01-12 NOTE — PROGRESS NOTES
Observation goals  PRIOR TO DISCHARGE       Comments: -diagnostic tests and consults completed and resulted: not met   -vital signs normal or at patient baseline: not met, HTN   -adequate pain control on oral analgesics: not met   Nurse to notify provider when observation goals have been met and patient is ready for discharge.

## 2023-01-12 NOTE — PRE-PROCEDURE
GENERAL PRE-PROCEDURE:   Procedure:  Lumbar TF CHANTAL @ L3-L4  Date/Time:  1/12/2023 2:44 PM    Written consent obtained?: Yes    Risks and benefits: Risks, benefits and alternatives were discussed    Consent given by:  Patient  Patient states understanding of procedure being performed: Yes    Patient's understanding of procedure matches consent: Yes    Procedure consent matches procedure scheduled: Yes    Expected level of sedation:  Moderate  Appropriately NPO:  Yes  Mallampati  :  Grade 1- soft palate, uvula, tonsillar pillars, and posterior pharyngeal wall visible  Lungs:  Lungs clear with good breath sounds bilaterally  Heart:  Normal heart sounds and rate  History & Physical reviewed:  History and physical reviewed and no updates needed  Statement of review:  I have reviewed the lab findings, diagnostic data, medications, and the plan for sedation

## 2023-01-12 NOTE — PROVIDER NOTIFICATION
"MD Notification    Notified Person: MD    Notified Person Name: Ethan Moncada MD    Notification Date/Time: 01/11/23  7:56 PM    Notification Interaction: amcom    Purpose of Notification: \"pt states oxy 5mg and dilaudid 0.2 not helping at all. Able to increase her dose? Thanks.\"    Orders Received: Oxy 10mg and Dilaudid 0.3mg ordered    Comments:    "

## 2023-01-12 NOTE — PROGRESS NOTES
Observation goals  PRIOR TO DISCHARGE        Comments:   -diagnostic tests and consults completed and resulted- NOT MET   -vital signs normal or at patient baseline- NOT MET   -adequate pain control on oral analgesics- NOT MET   Nurse to notify provider when observation goals have been met and patient is ready for discharge.

## 2023-01-12 NOTE — PROGRESS NOTES
RADIOLOGY PROCEDURE NOTE  Patient name: Coretta Kolb  MRN: [id number]  : 1945    Pre-procedure diagnosis: Left leg pain. DDD  Post-procedure diagnosis: Same    Procedure Date/Time: 2023  3:41 PM  Procedure: Left L3-L4 TFESI.  Very tight neuro foramen.  Conscious sedation used.  2 mg of versed and 100 mcg of fentanyl.  Pt reported good  Pain relief after the case.  BP dropped to high 70s and 80s at the end of the case.  Pt transferred to bed and monitored.  BP then came back up to 100s after approximately 5 minutes.  Pt alert throughout the case.    Estimated blood loss: None  Specimen(s) collected with description: none  The patient tolerated the procedure well with no immediate complications.  Significant findings:none    See imaging dictation for procedural details.    Provider name: Edgar Andrew PA-C  Assistant(s):None

## 2023-01-12 NOTE — PLAN OF CARE
Observation goals  PRIOR TO DISCHARGE        Comments:   -diagnostic tests and consults completed and resulted: Not met; CHANTAL today  -vital signs normal or at patient baseline: Met  -adequate pain control on oral analgesics: Not met  Nurse to notify provider when observation goals have been met and patient is ready for discharge.

## 2023-01-12 NOTE — PLAN OF CARE
Orientation/Cognitive: A&Ox4, forgetful.   Observation Goals (Met/ Not Met): Not met  Mobility Level/Assist: A2/walker/gb; pivot BSC  Fall Risk (Y/N): Yes  Behavior Concerns: None  Pain Management: Always rates pain 10/10, although resting and appears comfortable. PRN Oxycodone 10mg given, scheduled tylenol and gabapentin, heat packs.   Tele/VS/O2: VSS on RA ex HTN  ABNL Lab/BG:  152  Diet: Mod CHO  Bowel/Bladder: Incontinent, purewick overnight; No BM in 4 days, bowel meds ordered.   Skin Concerns: BLE edema, L knee abrasion. Blanchable redness to sacrum, encouraged repo'ing.   Drains/Devices: PIV SL  Tests/Procedures for next shift: plan for CHANTAL by neurosurg at 1430.   Anticipated DC date & active delays: TBD pending improvement, CHANTAL, and placement to TCU.  Patient Stated Goal for Today: pain control

## 2023-01-13 ENCOUNTER — APPOINTMENT (OUTPATIENT)
Dept: OCCUPATIONAL THERAPY | Facility: CLINIC | Age: 78
End: 2023-01-13
Payer: MEDICARE

## 2023-01-13 ENCOUNTER — APPOINTMENT (OUTPATIENT)
Dept: GENERAL RADIOLOGY | Facility: CLINIC | Age: 78
End: 2023-01-13
Attending: INTERNAL MEDICINE
Payer: MEDICARE

## 2023-01-13 LAB
GLUCOSE BLDC GLUCOMTR-MCNC: 232 MG/DL (ref 70–99)
GLUCOSE BLDC GLUCOMTR-MCNC: 264 MG/DL (ref 70–99)
GLUCOSE BLDC GLUCOMTR-MCNC: 281 MG/DL (ref 70–99)
GLUCOSE BLDC GLUCOMTR-MCNC: 310 MG/DL (ref 70–99)
GLUCOSE BLDC GLUCOMTR-MCNC: 349 MG/DL (ref 70–99)

## 2023-01-13 PROCEDURE — 82962 GLUCOSE BLOOD TEST: CPT

## 2023-01-13 PROCEDURE — 73552 X-RAY EXAM OF FEMUR 2/>: CPT | Mod: LT

## 2023-01-13 PROCEDURE — 73502 X-RAY EXAM HIP UNI 2-3 VIEWS: CPT

## 2023-01-13 PROCEDURE — 250N000013 HC RX MED GY IP 250 OP 250 PS 637: Performed by: STUDENT IN AN ORGANIZED HEALTH CARE EDUCATION/TRAINING PROGRAM

## 2023-01-13 PROCEDURE — 97535 SELF CARE MNGMENT TRAINING: CPT | Mod: GO

## 2023-01-13 PROCEDURE — 96376 TX/PRO/DX INJ SAME DRUG ADON: CPT

## 2023-01-13 PROCEDURE — 99232 SBSQ HOSP IP/OBS MODERATE 35: CPT | Performed by: INTERNAL MEDICINE

## 2023-01-13 PROCEDURE — 250N000013 HC RX MED GY IP 250 OP 250 PS 637: Performed by: INTERNAL MEDICINE

## 2023-01-13 PROCEDURE — G0378 HOSPITAL OBSERVATION PER HR: HCPCS

## 2023-01-13 RX ORDER — CYCLOBENZAPRINE HCL 5 MG
5 TABLET ORAL 3 TIMES DAILY PRN
Qty: 15 TABLET | Refills: 0 | Status: SHIPPED | OUTPATIENT
Start: 2023-01-13 | End: 2023-02-08

## 2023-01-13 RX ORDER — OXYCODONE HYDROCHLORIDE 5 MG/1
5 TABLET ORAL EVERY 6 HOURS PRN
Qty: 16 TABLET | Refills: 0 | Status: SHIPPED | OUTPATIENT
Start: 2023-01-13 | End: 2023-01-14

## 2023-01-13 RX ADMIN — AMLODIPINE BESYLATE 10 MG: 10 TABLET ORAL at 08:54

## 2023-01-13 RX ADMIN — PREDNISOLONE ACETATE 1 DROP: 10 SUSPENSION/ DROPS OPHTHALMIC at 22:13

## 2023-01-13 RX ADMIN — KETOROLAC TROMETHAMINE 1 DROP: 5 SOLUTION OPHTHALMIC at 11:45

## 2023-01-13 RX ADMIN — PREDNISOLONE ACETATE 1 DROP: 10 SUSPENSION/ DROPS OPHTHALMIC at 17:01

## 2023-01-13 RX ADMIN — INSULIN ASPART 2 UNITS: 100 INJECTION, SOLUTION INTRAVENOUS; SUBCUTANEOUS at 12:16

## 2023-01-13 RX ADMIN — INSULIN GLARGINE 32 UNITS: 100 INJECTION, SOLUTION SUBCUTANEOUS at 09:13

## 2023-01-13 RX ADMIN — SENNOSIDES 1 TABLET: 8.6 TABLET, FILM COATED ORAL at 08:54

## 2023-01-13 RX ADMIN — OXYCODONE HYDROCHLORIDE 10 MG: 5 TABLET ORAL at 22:11

## 2023-01-13 RX ADMIN — ACETAMINOPHEN 975 MG: 325 TABLET, FILM COATED ORAL at 09:08

## 2023-01-13 RX ADMIN — GABAPENTIN 900 MG: 300 CAPSULE ORAL at 22:11

## 2023-01-13 RX ADMIN — POLYETHYLENE GLYCOL 3350 17 G: 17 POWDER, FOR SOLUTION ORAL at 08:55

## 2023-01-13 RX ADMIN — SENNOSIDES 1 TABLET: 8.6 TABLET, FILM COATED ORAL at 22:11

## 2023-01-13 RX ADMIN — LEVOTHYROXINE SODIUM 100 MCG: 100 TABLET ORAL at 08:54

## 2023-01-13 RX ADMIN — CYCLOBENZAPRINE 5 MG: 5 TABLET, FILM COATED ORAL at 00:09

## 2023-01-13 RX ADMIN — GATIFLOXACIN 1 DROP: 5 SOLUTION/ DROPS OPHTHALMIC at 22:13

## 2023-01-13 RX ADMIN — GABAPENTIN 900 MG: 300 CAPSULE ORAL at 08:54

## 2023-01-13 RX ADMIN — INSULIN ASPART 9 UNITS: 100 INJECTION, SOLUTION INTRAVENOUS; SUBCUTANEOUS at 09:18

## 2023-01-13 RX ADMIN — GATIFLOXACIN 1 DROP: 5 SOLUTION/ DROPS OPHTHALMIC at 17:01

## 2023-01-13 RX ADMIN — KETOROLAC TROMETHAMINE 1 DROP: 5 SOLUTION OPHTHALMIC at 17:01

## 2023-01-13 RX ADMIN — PREDNISOLONE ACETATE 1 DROP: 10 SUSPENSION/ DROPS OPHTHALMIC at 11:46

## 2023-01-13 RX ADMIN — ACETAMINOPHEN 975 MG: 325 TABLET, FILM COATED ORAL at 17:08

## 2023-01-13 RX ADMIN — INSULIN ASPART 5 UNITS: 100 INJECTION, SOLUTION INTRAVENOUS; SUBCUTANEOUS at 17:08

## 2023-01-13 RX ADMIN — LISINOPRIL 30 MG: 20 TABLET ORAL at 08:54

## 2023-01-13 RX ADMIN — CYCLOBENZAPRINE 5 MG: 5 TABLET, FILM COATED ORAL at 09:09

## 2023-01-13 RX ADMIN — PREDNISOLONE ACETATE 1 DROP: 10 SUSPENSION/ DROPS OPHTHALMIC at 09:08

## 2023-01-13 RX ADMIN — KETOROLAC TROMETHAMINE 1 DROP: 5 SOLUTION OPHTHALMIC at 22:13

## 2023-01-13 RX ADMIN — KETOROLAC TROMETHAMINE 1 DROP: 5 SOLUTION OPHTHALMIC at 09:07

## 2023-01-13 ASSESSMENT — ACTIVITIES OF DAILY LIVING (ADL)
ADLS_ACUITY_SCORE: 44
ADLS_ACUITY_SCORE: 40
ADLS_ACUITY_SCORE: 44
ADLS_ACUITY_SCORE: 44
ADLS_ACUITY_SCORE: 40
ADLS_ACUITY_SCORE: 47
ADLS_ACUITY_SCORE: 40

## 2023-01-13 NOTE — PROGRESS NOTES
"[facility]    Neurosurgery Progress Note    Date of Service (when I saw the patient): 01/13/2023     Assessment & Plan   77F with history of L4-5 fusion and L2-4 laminectomies in 2010, presented with left lateral thigh pain. She underwent a left L3-4 CHANTAL 1/12/2023. Today she reports improvement in let lateral thigh pain. She has baseline paresthesias in her bilateral feet, unchanged. She reports weakness in her left leg due to pain. No new or worsening symptoms.    Plan:  -Activity as tolerated  -Pain management as needed  -Continue to monitor effect of CHANTAL  -Appreciate assistance from other services.     Julieta Carreno, RICKEY  [facility] Neurosurgery  [facility]  [address]  [address] [place] [id number]    Tel [phone]    Interval History   Stable improving slowly.    Physical Exam   Temp: 98.3  F (36.8  C) Temp src: Axillary BP: (!) 168/80 Pulse: 81   Resp: 18 SpO2: 95 % O2 Device: None (Room air) Oxygen Delivery: 2 LPM  Vitals:    01/09/23 2226   Weight: 243 lb (110.2 kg)     Vital Signs with Ranges  Temp:  [97.5  F (36.4  C)-98.3  F (36.8  C)] 98.3  F (36.8  C)  Pulse:  [65-81] 81  Resp:  [16-18] 18  BP: ()/(40-80) 168/80  SpO2:  [92 %-99 %] 95 %  I/O last 3 completed shifts:  In: -   Out: 300 [Urine:300]     , Blood pressure (!) 168/80, pulse 81, temperature 98.3  F (36.8  C), temperature source Axillary, resp. rate 18, height 5' 1\" (1.549 m), weight 243 lb (110.2 kg), SpO2 95 %, not currently breastfeeding.  243 lbs 0 oz  HEENT:  Normocephalic.  PERRLA.    Heart:  No peripheral edema  Lungs:  No SOB  Skin:  Warm and dry, good capillary refill.  Extremities:  Good radial and dorsalis pedis pulses bilaterally, no edema, cyanosis or clubbing.    NEUROLOGICAL EXAMINATION:   Mental status:  Alert and Oriented x 3, speech is fluent.  Motor:  Exam limited due to left thigh pain with movement.   Hip Flexor:                Right: 5/5  Left:  5/5 " (difficult to assess due to pain)  Hip Adductor:             Right:  5/5  Left:  5/5  Hip Abductor:             Right:  5/5  Left:  5/5  Gastroc Soleus:        Right:  5/5  Left:  5/5  Tib/Ant:                      Right:  5/5  Left:  5/5  EHL:                          Right:  5/5  Left:  5/5  Sensation:  intact    Medications       acetaminophen  975 mg Oral Q8H     amLODIPine  10 mg Oral Daily     gabapentin  900 mg Oral BID     gatifloxacin  1 drop Both Eyes 4x Daily     insulin aspart  1-10 Units Subcutaneous TID AC     insulin aspart  1-7 Units Subcutaneous At Bedtime     insulin aspart prot & aspart  50 Units Subcutaneous QAM AC     insulin glargine  32 Units Subcutaneous QAM     ketorolac  1 drop Both Eyes 4x Daily     levothyroxine  100 mcg Oral Daily     lisinopril  30 mg Oral Daily     polyethylene glycol  17 g Oral Daily     prednisoLONE acetate  1 drop Both Eyes 4x Daily     sennosides  1 tablet Oral BID       Data     CBC RESULTS:   Recent Labs   Lab Test 01/10/23  0734   WBC 10.0   RBC 4.36   HGB 12.7   HCT 39.0   MCV 89   MCH 29.1   MCHC 32.6   RDW 13.2        Basic Metabolic Panel:  Lab Results   Component Value Date     01/09/2023     10/27/2014      Lab Results   Component Value Date    POTASSIUM 4.1 01/09/2023    POTASSIUM 3.6 10/27/2014     Lab Results   Component Value Date    CHLORIDE 103 01/09/2023    CHLORIDE 105 10/27/2014     Lab Results   Component Value Date    KATERIN 9.6 01/09/2023    KATERIN 9.5 10/27/2014     Lab Results   Component Value Date    CO2 23 01/09/2023    CO2 24 10/27/2014     Lab Results   Component Value Date    BUN 22 01/09/2023    BUN 13 10/27/2014     Lab Results   Component Value Date    CR 0.62 01/09/2023    CR 0.61 10/27/2014     Lab Results   Component Value Date     01/13/2023     01/09/2023     10/27/2014     INR:  No results found for: INR

## 2023-01-13 NOTE — PROGRESS NOTES
Observation Goals:     -diagnostic tests and consults completed and resulted - in progress     -vital signs normal or at patient baseline -met      -adequate pain control on oral analgesics - not met. 9/10 pain-improving

## 2023-01-13 NOTE — PROGRESS NOTES
[facility]    Medicine Progress Note - Hospitalist Service        Date of Admission:  1/9/2023 10:44 AM    Assessment & Plan:   Coretta Kolb is a 77 year old female who presents with back pain.     Intractable left lower back and left lateral thigh pain due to suspected left L3-4 radiculopathy  Severe spinal canal stenosis at L3-L4, and severe bilateral neural foraminal stenosis at L2-L3  History of L2-L3 hemilaminectomy and discectomy, L3-5 laminectomy and foraminotomies bilaterally and L4-5 spinal fusion in November 2010  -Patient appears to have longstanding problem with lumbar spine disease  -Underwent above surgery in 2010, with Dr. Gutierrez of [facility] and subsequently has had multiple epidural injection with last injection being left L3 transforaminal epidural steroid injection pain 2016  -Presents with left lower back pain and left lateral thigh pain(apparently similar in presentatio to previous episodes)  -Initial MRI of the spine was not a good quality study, subsequently she had repeat MRI of the lumbar spine under sedation on 1/11 which shows severe spinal canal stenosis at L3-L4 again on a limited exam and severe bilateral neural foraminal stenosis at L2-L3  -Patient mentions that pain is very consistent with previous pains which she had relief with epidural steroid injection  -Evaluated by neurosurgery who recommended epidural steroid injection, which was completed yesterday i.e. 1/13/2023.  -It appears like patient had immediate pain relief on the left leg yesterday however pain has appeared to come back again.  Continue to monitor has she can continue to have improvement in pain for up to 72 hours after CHANTAL  -For completeness x-ray of the left pelvis and femur completed today, no evidence of fractures or other acute findings  -Continue current pain regimen which includes Tylenol 975 mg 3 times daily, Flexeril 5 mg 3 times daily as needed, oxycodone and Dilaudid as  "needed  -PT recommending TCU     Insulin-dependent diabetes mellitus type 2    -Blood sugars were very erratic yesterday as patient had to be n.p.o. at various times.  She was also hyperglycemic on 1 or 2 occasions  -Continue Lantus 32 units every morning   -Continue NovoLog Mix 70/30 at 50 units in the morning  -Will resume NovoLog Mix 70/30 at slightly reduced dose of 50 units(PTA on 60 units) starting tonight  -High intensity sliding scale    ?  UTI  -Urine culture growing group B strep  -Completed 3 days of Rocephin     Essential hypertension  -Continue PTA atenolol and lisinopril     Hypothyroidism  -Continue levothyroxine    ?  Cognitive impairment  -Daughter concerned about patient's recent memory.  -OT following, awaiting cognitive eval which likely will be completed today      Diet: Moderate Consistent Carb (60 g CHO per Meal) Diet     DVT Prophylaxis: Pneumatic Compression Devices   Rodriguez Catheter: Not present  Code Status: Full Code     Disposition Plan      Expected Discharge Date: 01/13/2023      Destination: inpatient rehabilitation facility  Discharge Comments: CHANTAL today.   Ortho following.   [facility]      Entered: Adolfo Amaya MD 01/13/2023, 12:39 PM        Clinically Significant Risk Factors Present on Admission                  # Hypertension: home medication list includes antihypertensive(s)      # Severe Obesity: Estimated body mass index is 45.91 kg/m  as calculated from the following:    Height as of this encounter: 1.549 m (5' 1\").    Weight as of this encounter: 110.2 kg (243 lb).              The patient's care was discussed with the  bedside nurse and the patient.    Adolfo Amaya MD  Hospitalist Service  [facility]  Text Page 7AM-6PM  Securely message with the Vocera Web Console (learn more here)  Text page via [employer] Paging/Directory    ______________________________________________________________________    Interval History   Patient " "underwent epidural steroid injection yesterday apparently had immediate pain relief on the left thigh however pain has come back again today.  Blood sugars on the higher side.  No new complaints.    Data reviewed today: I reviewed all medications, new labs and imaging results over the last 24 hours. I personally reviewed no images or EKG's today.    Physical Exam   Vital signs:  Temp: 98.3  F (36.8  C) Temp src: Oral BP: (!) 149/64 Pulse: 82   Resp: 18 SpO2: 90 % O2 Device: None (Room air) Oxygen Delivery: 2 LPM Height: 154.9 cm (5' 1\") Weight: 110.2 kg (243 lb)  Estimated body mass index is 45.91 kg/m  as calculated from the following:    Height as of this encounter: 1.549 m (5' 1\").    Weight as of this encounter: 110.2 kg (243 lb).      Wt Readings from Last 2 Encounters:   01/09/23 110.2 kg (243 lb)   11/30/22 99.8 kg (220 lb)       Gen: AAOX3, NAD, comfortable  Resp: CTA B/L, normal WOB  CVS: RRR, no murmur  Abd/GI: Soft, non-tender. BS- normoactive.    MSK: Mild tenderness over left lateral thigh which is changed from previous days.  Neuro- CN- intact.       Data   Recent Labs   Lab 01/13/23  1208 01/13/23  0843 01/13/23  0203 01/10/23  0758 01/10/23  0734 01/09/23  1824 01/09/23  1104   WBC  --   --   --   --  10.0  --  12.8*   HGB  --   --   --   --  12.7  --  14.3   MCV  --   --   --   --  89  --  89   PLT  --   --   --   --  271  --  290   NA  --   --   --   --   --   --  134   POTASSIUM  --   --   --   --   --   --  4.1   CHLORIDE  --   --   --   --   --   --  103   CO2  --   --   --   --   --   --  23   BUN  --   --   --   --   --   --  22   CR  --   --   --   --   --   --  0.62   ANIONGAP  --   --   --   --   --   --  8   KATERIN  --   --   --   --   --   --  9.6   * 349* 310*   < >  --    < > 273*    < > = values in this interval not displayed.       Recent Results (from the past 24 hour(s))   XR Lumbar Spine 2/3 Views    Narrative    LUMBAR SPINE 2/3 VIEWS 1/12/2023 2:25 PM     COMPARISON: Lumbar " spine MRI 1/11/2023.    HISTORY: Status post lumbar fusion.      Impression    IMPRESSION: Posterior spinal fusion hardware from L4 down to L5 again  noted. No evidence for hardware failure or loosening. Mild  degenerative retrolisthesis of L1 upon L2. Minimal degenerative  anterolisthesis of L3 upon L4. Alignment otherwise normal. Vertebral  body heights normal. No fractures. Loss of disc space height and  degenerative endplate spurring at L1-L2, L2-L3 and L3-L4. Facet  arthropathy throughout the lumbar spine.    NEENA YANEZ MD         SYSTEM ID:  [id number]   XR Pelvis w Hip Left 1 View    Narrative    PELVIS AND LEFT HIP, ONE VIEW  1/13/2023 9:58 AM    INDICATION: Pain, unable to weight-bear.    COMPARISON: None available.       Impression    IMPRESSION: Anatomic alignment left hip. No acute displaced left hip  fracture. Mild bilateral hip osteoarthritis. No acute displaced pelvic  fracture. Symmetric SI joints. Postop and degenerative change lower  lumbar spine.    KRIS MAGALLANES MD         SYSTEM ID:  [id number]   XR Femur Left 2 Views    Narrative    EXAM:  FEMUR LEFT TWO VIEWS  DATE/TIME: 1/13/2023 9:59 AM    INDICATION: Pain, unable to weight bear.    COMPARISON: None available.       Impression    IMPRESSION: Anatomic alignment left femur. No acute displaced left  femur fracture. Mild left hip and moderate left knee osteoarthritis.  No appreciable left knee joint effusion. Arterial calcification.       KRIS MAGALLANES MD         SYSTEM ID:  [id number]     Medications       acetaminophen  975 mg Oral Q8H     amLODIPine  10 mg Oral Daily     gabapentin  900 mg Oral BID     gatifloxacin  1 drop Both Eyes 4x Daily     insulin aspart  1-10 Units Subcutaneous TID AC     insulin aspart  1-7 Units Subcutaneous At Bedtime     insulin aspart prot & aspart  50 Units Subcutaneous Daily with supper     insulin aspart prot & aspart  50 Units Subcutaneous QAM AC     insulin glargine  32 Units Subcutaneous QAM     ketorolac   1 drop Both Eyes 4x Daily     levothyroxine  100 mcg Oral Daily     lisinopril  30 mg Oral Daily     polyethylene glycol  17 g Oral Daily     prednisoLONE acetate  1 drop Both Eyes 4x Daily     sennosides  1 tablet Oral BID

## 2023-01-13 NOTE — PROGRESS NOTES
Observation goals  PRIOR TO DISCHARGE        Comments: -diagnostic tests and consults completed and resulted - not met  -vital signs normal or at patient baseline - met  -adequate pain control on oral analgesics - not met  Nurse to notify provider when observation goals have been met and patient is ready for discharge.        Orientation/Cognitive: AOX4. Forgetful.  Observation Goals (Met/ Not Met): not met  Mobility Level/Assist Equipment: AX2 GB/walker.  Fall Risk (Y/N): Yes  Behavior Concerns: none  Pain Management: Scheduled tylenol & gabapentin. Prn oxy X1.   Tele/VS/O2: VSS on RA. No tele  ABNL Lab/BG: UA-abnormal. BG- 64, 54, 59, 111, 393.  Diet: Mod carb  Bowel/Bladder: Incontinent. Purewick. No BM.  Skin Concerns: L lower back epidural injection site bandage CDI. BLE edema. L knee abrasion.  Drains/Devices: PIV SL.  Tests/Procedures for next shift: none  Anticipated DC date & active delays: TCU placement pending. SW following, referral sent.  Patient Stated Goal for Today: Pain control.

## 2023-01-13 NOTE — PROGRESS NOTES
Shift: [id number]  Orientation/Cognitive: AOx4  Observation Goals (Met/ Not Met): not met, was complaining of left hip 10/10, flexeril 5mg given, patient asleep, has not asked pain med.  Mobility Level/Assist Equipment: A1-2, has not gotten up yet.  Fall Risk (Y/N): yes  Behavior Concerns: calm and cooperative  Pain Management: Complains of muscular achy hip pain 10/10, gave Flexeril 5mg - patient asleep, has not asked pain meds.  Tele/VS/O2: vitally stable and on room air.  ABNL Lab/BG: none  Diet: regular  Bowel/Bladder: incontinent bowel and bladder  Skin Concerns: none  Drains/Devices: PIV SL  Tests/Procedures for next shift: none  Anticipated DC date & active delays: 1/13/23      Observation Goals:    -diagnostic tests and consults completed and resulted - in progress    -vital signs normal or at patient baseline - vitally stable and on room air.    -adequate pain control on oral analgesics - denies lower back pain but complains of 10/10 constant muscular achy pain on left thigh, gave Flexeril 5mg - patient appears calm and has declined scheduled Tylenol at 0300, patient unable to rate the pain level when asked at 0300, but looks comfortable. Patient asleep

## 2023-01-13 NOTE — PROGRESS NOTES
Care Management Follow Up    Length of Stay (days): 0    Expected Discharge Date: 01/13/2023     Concerns to be Addressed:       Patient plan of care discussed at interdisciplinary rounds: Yes    Anticipated Discharge Disposition:       Anticipated Discharge Services:    Anticipated Discharge DME:      Patient/family educated on Medicare website which has current facility and service quality ratings:    Education Provided on the Discharge Plan:    Patient/Family in Agreement with the Plan:      Referrals Placed by CM/SW:    Private pay costs discussed: Not applicable    Additional Information:  [facility]. [facility] informed they are unsure whether they will have a bed available today.    Addendum: [facility] can accept patient once medically ready. [facility] spoke with patient/daughter and both are in agreement      BERENICE Nelson    [facility]

## 2023-01-13 NOTE — PROGRESS NOTES
Observation Goals:    -diagnostic tests and consults completed and resulted - in progress    -vital signs normal or at patient baseline - vitally stable and on room air.    -adequate pain control on oral analgesics - denies lower back pain but complains of 10/10 constant muscular achy pain on left thigh, gave Flexeril 5mg - patient appears calm and has declined scheduled Tylenol at 0300, patient unable to rate the pain level when asked at 0300, but looks comfortable.

## 2023-01-13 NOTE — PROGRESS NOTES
Observation goals  PRIOR TO DISCHARGE        Comments: -diagnostic tests and consults completed and resulted - not met  -vital signs normal or at patient baseline - met  -adequate pain control on oral analgesics - met  Nurse to notify provider when observation goals have been met and patient is ready for discharge.

## 2023-01-13 NOTE — PROVIDER NOTIFICATION
"MD Notification    Notified Person: MD    Notified Person Name: Adolfo Amaya     Notification Date/Time: 01/13/23  11:09 AM      Notification Interaction: august     Purpose of Notification: FYI- pt back from xrays, not officially signed off yet but preliminary results are in. Also, pt's blood sugars have been elevated the last 12 hours. This AM was 349    Orders Received:    4:27 PM  Pt had SLUMS score done yesterday- pt scored a 20. It doesn't show up in their note. it's in the \"IP OT Eval/Treat\" flowsheet.    Comments:      "

## 2023-01-14 VITALS
BODY MASS INDEX: 45.88 KG/M2 | TEMPERATURE: 97.8 F | HEART RATE: 71 BPM | OXYGEN SATURATION: 97 % | HEIGHT: 61 IN | DIASTOLIC BLOOD PRESSURE: 46 MMHG | SYSTOLIC BLOOD PRESSURE: 110 MMHG | RESPIRATION RATE: 18 BRPM | WEIGHT: 243 LBS

## 2023-01-14 LAB
GLUCOSE BLDC GLUCOMTR-MCNC: 135 MG/DL (ref 70–99)
GLUCOSE BLDC GLUCOMTR-MCNC: 147 MG/DL (ref 70–99)
GLUCOSE BLDC GLUCOMTR-MCNC: 153 MG/DL (ref 70–99)

## 2023-01-14 PROCEDURE — 82962 GLUCOSE BLOOD TEST: CPT | Mod: 91

## 2023-01-14 PROCEDURE — 99238 HOSP IP/OBS DSCHRG MGMT 30/<: CPT | Performed by: HOSPITALIST

## 2023-01-14 PROCEDURE — G0378 HOSPITAL OBSERVATION PER HR: HCPCS

## 2023-01-14 PROCEDURE — 250N000013 HC RX MED GY IP 250 OP 250 PS 637: Performed by: INTERNAL MEDICINE

## 2023-01-14 RX ORDER — INSULIN LISPRO 100 [IU]/ML
INJECTION, SOLUTION INTRAVENOUS; SUBCUTANEOUS
Qty: 15 ML | DISCHARGE
Start: 2023-01-14

## 2023-01-14 RX ORDER — ACETAMINOPHEN 325 MG/1
975 TABLET ORAL EVERY 8 HOURS
Status: ON HOLD | DISCHARGE
Start: 2023-01-14 | End: 2024-02-23

## 2023-01-14 RX ORDER — OXYCODONE HYDROCHLORIDE 5 MG/1
5-10 TABLET ORAL EVERY 6 HOURS PRN
Qty: 16 TABLET | Refills: 0 | Status: SHIPPED | OUTPATIENT
Start: 2023-01-14 | End: 2023-02-08

## 2023-01-14 RX ADMIN — PREDNISOLONE ACETATE 1 DROP: 10 SUSPENSION/ DROPS OPHTHALMIC at 10:04

## 2023-01-14 RX ADMIN — GABAPENTIN 900 MG: 300 CAPSULE ORAL at 07:56

## 2023-01-14 RX ADMIN — ACETAMINOPHEN 975 MG: 325 TABLET, FILM COATED ORAL at 12:14

## 2023-01-14 RX ADMIN — SENNOSIDES 1 TABLET: 8.6 TABLET, FILM COATED ORAL at 07:56

## 2023-01-14 RX ADMIN — INSULIN GLARGINE 32 UNITS: 100 INJECTION, SOLUTION SUBCUTANEOUS at 10:03

## 2023-01-14 RX ADMIN — INSULIN ASPART 1 UNITS: 100 INJECTION, SOLUTION INTRAVENOUS; SUBCUTANEOUS at 10:54

## 2023-01-14 RX ADMIN — POLYETHYLENE GLYCOL 3350 17 G: 17 POWDER, FOR SOLUTION ORAL at 07:56

## 2023-01-14 RX ADMIN — LISINOPRIL 30 MG: 20 TABLET ORAL at 07:55

## 2023-01-14 RX ADMIN — AMLODIPINE BESYLATE 10 MG: 10 TABLET ORAL at 07:55

## 2023-01-14 RX ADMIN — GATIFLOXACIN 1 DROP: 5 SOLUTION/ DROPS OPHTHALMIC at 10:04

## 2023-01-14 RX ADMIN — KETOROLAC TROMETHAMINE 1 DROP: 5 SOLUTION OPHTHALMIC at 10:04

## 2023-01-14 RX ADMIN — LEVOTHYROXINE SODIUM 100 MCG: 100 TABLET ORAL at 07:56

## 2023-01-14 RX ADMIN — CYCLOBENZAPRINE 5 MG: 5 TABLET, FILM COATED ORAL at 07:55

## 2023-01-14 ASSESSMENT — ACTIVITIES OF DAILY LIVING (ADL)
ADLS_ACUITY_SCORE: 40

## 2023-01-14 NOTE — PROGRESS NOTES
Orientation/Cognitive: A&Ox4.   Observation Goals (Met/ Not Met): Not met  Mobility Level/Assist: A2/walker/gb; Sleeping in bed                  Fall Risk (Y/N): Yes  Behavior Concerns: None  Pain Management: Pt slept without complaint during shift.   Tele/VS/O2: VSS on RA   ABNL Lab/BG: None  Diet: Mod CHO  Bowel/Bladder: Cont. Constipation, bowel regimen in place. Purewick in place  Skin Concerns: BLE edema, L knee abrasion. Sacral mepilex-CDI. CHANTAL Band-Aid CDI   CMS: baseline numbness BLE   Drains/Devices: PIV SL  Tests/Procedures for next shift: N/A  Anticipated DC date & active delays: TBD   Other: SW/neurosurgery/OT/PT following. Xray pelvis/hip/femur   Patient Stated Goal for Today: pain control rest/sleep        Observation Goals:     -diagnostic tests and consults completed and resulted - in progress     -vital signs normal or at patient baseline -met      -adequate pain control on oral analgesics - not met. Pt slept throughout shift without any complaints of pain,    Charline Gandara RN

## 2023-01-14 NOTE — PLAN OF CARE
Observation Goals:     -diagnostic tests and consults completed and resulted - Met     -vital signs normal or at patient baseline -met      -adequate pain control on oral analgesics - Met

## 2023-01-14 NOTE — PLAN OF CARE
Orientation/Cognitive: A&Ox4 (forgetful at times)  Observation Goals (Met/ Not Met):  met  Mobility Level/Assist: A2/walker/gb; up to BS Commode and chair this am              Fall Risk (Y/N): Yes  Behavior Concerns: None  Pain Management: gave flexaril and Tylenol this morning  Tele/VS/O2: VSS on RA   ABNL Lab/BG:   Diet: Mod CHO  Bowel/Bladder: Cont. Constipation gave miralax and senna this morning Purewick in place  Skin Concerns: BLE edema, L knee abrasion. Sacral mepilex-CDI.   CMS: baseline numbness BLE   Drains/Devices: PIV SL  Tests/Procedures for next shift: discharging to   Anticipated DC date & active delays: XFR to    Patient Stated Goal for Today: Discharge

## 2023-01-14 NOTE — DISCHARGE SUMMARY
[facility]  Hospitalist Discharge Summary      Date of Admission:  1/9/2023  Date of Discharge:  1/14/2023  Discharging Provider: Brandon Crespo MD  Discharge Service: Hospitalist Service    Discharge Diagnoses   Intractable back pain s/p epidural spinal injection  Severe spinal stenosis and foraminal stenosis  Hx hemilaminectomy and discectomy  DM II on long term insulin  Possible UTI  HTN  Hypothyroidism  Possible cognitive impairment    Follow-ups Needed After Discharge   Follow-up Appointments     Follow Up and recommended labs and tests      Follow up with Nursing home physician.  No follow up labs or test are   needed.             Discharge Disposition   Discharged to nursing home  Condition at discharge: Stable    Hospital Course    Coretta Kolb is a 77 year old female who presents with back pain.       Intractable left lower back and left lateral thigh pain due to suspected left L3-4 radiculopathy  Severe spinal canal stenosis at L3-L4, and severe bilateral neural foraminal stenosis at L2-L3  History of L2-L3 hemilaminectomy and discectomy, L3-5 laminectomy and foraminotomies bilaterally and L4-5 spinal fusion in November 2010  * Patient appears to have longstanding problem with lumbar spine disease  * Underwent above surgery in 2010, with Dr. Gutierrez of [facility] and subsequently has had multiple epidural injection with last injection being left L3 transforaminal epidural steroid injection pain 2016  * Presents with left lower back pain and left lateral thigh pain(apparently similar in presentatio to previous episodes)  * Initial MRI of the spine was not a good quality study, subsequently she had repeat MRI of the lumbar spine under sedation on 1/11 which shows severe spinal canal stenosis at L3-L4 again on a limited exam and severe bilateral neural foraminal stenosis at L2-L3  * Evaluated by neurosurgery who recommended epidural steroid injection, which was completed by  IR 1/13/2023 with patient reporting some improvement in symptoms (per Neurosurg may take 7 days before seeing maximum benefit).  * x-ray of the left pelvis and femur showed no evidence of fractures or other acute findings  --- Pain currently controlled with tylenol 975 mg 3 times daily, Flexeril 5 mg 3 times daily as needed, oxycodone as needed  --- PT recommending TCU     Insulin-dependent diabetes mellitus type 2  Blood sugars quite labile due to NPO status and insulin adjustments, improving at discharge so will continue PTA regimen of Lantus 32 units every morning, NovoLog Mix 70/30 at 50 units in the morning and 60 units in the evening.  Will add high dose sliding scale for TCU as well.      Possible UTI, resolved  Urine culture growing group B strep.  Completed 3 days of Rocephin     Essential hypertension  Continue PTA atenolol and lisinopril     Hypothyroidism  Continue levothyroxine     Possible cognitive impairment  Daughter concerned about patient's recent memory.  OT performed SLUMS test with score of 20.     Consultations This Hospital Stay   PHYSICAL THERAPY ADULT IP CONSULT  CARE MANAGEMENT / SOCIAL WORK IP CONSULT  SPINE SURGERY ADULT IP CONSULT  NEUROSURGERY IP CONSULT  OCCUPATIONAL THERAPY ADULT IP CONSULT  PHYSICAL THERAPY ADULT IP CONSULT  OCCUPATIONAL THERAPY ADULT IP CONSULT    Code Status   Full Code    Time Spent on this Encounter   I, Brandon Crespo MD, personally saw the patient today and spent less than or equal to 30 minutes discharging this patient.       Brandon Crespo MD  [facility] EXTEND[facility]OV[facility] SHORT STAY  [address] [place] [id number]  Phone: [phone]  ______________________________________________________________________    Physical Exam   Vital Signs: Temp: 97.8  F (36.6  C) Temp src: Oral BP: 110/46 Pulse: 71   Resp: 18 SpO2: 97 % O2 Device: None (Room air)    Weight: 243 lbs 0 oz  General Appearance: Well nourished female in  NAD  Respiratory: lungs CTAB, no wheezes or crackles, no tachypnea   Cardiovascular: RRR, normal s1/s2 without murmur  GI: abdomen soft, normal bowel sounds  Skin: no peripheral edema   Other: Alert and appropriate, cranial nerves grossly intact         Primary Care Physician   Myra Tesfaye    Discharge Orders      General info for SNF    Length of Stay Estimate: Short Term Care: Estimated # of Days <30  Condition at Discharge: Improving  Level of care:skilled   Rehabilitation Potential: Excellent  Admission H&P remains valid and up-to-date: Yes  Recent Chemotherapy: N/A  Use Nursing Home Standing Orders: Yes     Mantoux instructions    Give two-step Mantoux (PPD) Per Facility Policy Yes     Reason for your hospital stay    You were admitted for back pain which is suspected to be due to nerve impingement for which you received an epidural steroid injection.     Glucose monitor nursing POCT    Before meals and at bedtime     Activity - Up with nursing assistance     Follow Up and recommended labs and tests    Follow up with Nursing home physician.  No follow up labs or test are needed.  [facility] with [facility] as needed if not experiencing improvement in pain.     Physical Therapy Adult Consult    Evaluate and treat as clinically indicated.    Reason:  deconditioning     Occupational Therapy Adult Consult    Evaluate and treat as clinically indicated.    Reason:  deconditioning     Diet    Follow this diet upon discharge: Orders Placed This Encounter      Moderate Consistent Carb (60 g CHO per Meal) Diet       Significant Results and Procedures   Most Recent 3 CBC's:Recent Labs   Lab Test 01/10/23  0734 01/09/23  1104 11/30/22  0920   WBC 10.0 12.8* 8.3   HGB 12.7 14.3 15.2   MCV 89 89 89    290 222     Most Recent 3 BMP's:Recent Labs   Lab Test 01/14/23  1204 01/14/23  0801 01/14/23  0151 01/09/23  1824 01/09/23  1104 11/30/22  1121 11/30/22  0920   NA  --   --   --   --  134  --  135    POTASSIUM  --   --   --   --  4.1  --  4.6   CHLORIDE  --   --   --   --  103  --  103   CO2  --   --   --   --  23  --  23   BUN  --   --   --   --  22  --  18   CR  --   --   --   --  0.62  --  0.67   ANIONGAP  --   --   --   --  8  --  9   KATERIN  --   --   --   --  9.6  --  9.8   * 153* 147*   < > 273*   < > 432*    < > = values in this interval not displayed.     7-Day Micro Results     Collected Updated Procedure Result Status      01/09/2023 1830 01/10/2023 1404 Urine Culture [[id number]]   (Abnormal)   Urine, Clean Catch    Final result Component Value   Culture 10,000-50,000 CFU/mL Streptococcus agalactiae (Group B Streptococcus)      This organism is susceptible to ampicillin, penicillin, vancomycin and the cephalosporins. If treatment is required AND your patient is allergic to penicillin, contact the Microbiology Lab within 5 days to request susceptibility testing.                   Most Recent 6 glucoses:Recent Labs   Lab Test 01/14/23  1204 01/14/23  0801 01/14/23  0151 01/13/23  2137 01/13/23  1701 01/13/23  1208   * 153* 147* 232* 264* 281*     Most Recent Urinalysis:Recent Labs   Lab Test 01/09/23  1830   COLOR Light Yellow   APPEARANCE Slightly Cloudy*   URINEGLC 150*   URINEBILI Negative   URINEKETONE Negative   SG 1.025   UBLD Negative   URINEPH 6.0   PROTEIN 300*   NITRITE Negative   LEUKEST Small*   RBCU 2   WBCU 53*   ,   Results for orders placed or performed during the hospital encounter of 01/09/23   MR Lumbar Spine w/o Contrast    Narrative    MRI LUMBAR SPINE WITHOUT CONTRAST January 9, 2023 1:53 PM     HISTORY: Low back pain radiating down the left lower.     TECHNIQUE: Exam was terminated early per patient's wishes. Only the  sagittal T2 sequence was obtained.    COMPARISON: None.     FINDINGS: Limited imaging consisting of only sagittal T2 sequence.  Metallic fixation hardware at the L4-L5 level. Metal hardware causes  artifact and limits evaluation. Mild grade 1  retrolisthesis of L1 on  L2. No obvious loss of vertebral body height. Several presumed  Schmorl's node deformities in the lumbar spine. Marked degenerative  endplate changes and loss of disc height at L2-L3 and L3-L4. Moderate  degenerative endplate changes and loss of disc height at L1-L2. There  appears to be facet hypertrophy at L2-L3 and L3-L4. Apparent marked  spinal canal narrowing at L3-L4. Mild to moderate spinal canal  narrowing at L1-L2. Marked neural foraminal narrowings at L2-L3 and  L3-L4.      Impression    IMPRESSION:    1. Limited imaging consisting of only a sagittal T2 sequence. Exam was  terminated early due to patient's request.  2. Postoperative changes at L4-L5. Metal hardware at that level causes  artifact and limits evaluation.  3. Marked degenerative changes at the nonoperative levels particularly  L3-L4, L2-L3, and L1-L2. Recommend repeat imaging when the patient is  able.    ANIYAH ADAMES MD         SYSTEM ID:  [id number]   XR Tibia and Fibula Left 2 Views    Narrative    TIBIA AND FIBULA LEFT 2 VIEWS  DATE/TIME: 1/10/2023 12:32 PM    INDICATION: Pain at left knee cap and below knee post fall.    COMPARISON: None available.       Impression    IMPRESSION: Anatomic alignment left tibia and fibula. No acute  displaced tibia or fibula fracture identified. Degenerative arthrosis  left knee. Moderate to severe mid to distal leg and ankle soft tissue  swelling. Talonavicular osteoarthritis with dorsal spurring.    KRIS MAGALLANES MD         SYSTEM ID:  [id number]   XR Lumbar Spine 2/3 Views    Narrative    LUMBAR SPINE 2/3 VIEWS 1/12/2023 2:25 PM     COMPARISON: Lumbar spine MRI 1/11/2023.    HISTORY: Status post lumbar fusion.      Impression    IMPRESSION: Posterior spinal fusion hardware from L4 down to L5 again  noted. No evidence for hardware failure or loosening. Mild  degenerative retrolisthesis of L1 upon L2. Minimal degenerative  anterolisthesis of L3 upon L4. Alignment otherwise  normal. Vertebral  body heights normal. No fractures. Loss of disc space height and  degenerative endplate spurring at L1-L2, L2-L3 and L3-L4. Facet  arthropathy throughout the lumbar spine.    NEENA YANEZ MD         SYSTEM ID:  [id number]   XR Lumbar Epidural Injection    Narrative    XR LUMBAR EPIDURAL INJECTION INCL IMAGING             1/12/2023 3:38  PM       History:  Left L3-4 CHANTAL for left lateral leg pain, L2-3 was the last  CHANTAL that did give some relief, although many years ago.. Conscious  sedation required due to discomfort. Previous L4-5 fusion. Severe  central stenosis and severe foraminal stenosis at multiple levels.  Intervertebral disc disease with stenosis.    Procedure:  The risks (bleeding, infection, reaction to contrast and  medications) and benefits of the procedure were explained to the  patient and consent was obtained.  Using sterile technique and  fluoroscopic guidance a #22 gauge spinal needle was placed into the  left L3-4 foramen using a posterior- lateral approach.  A small amount  of contrast was injected confirming satisfactory position of the  needle tip. Very tight neuroforamen. Difficult to obtain ideal flow.  No vascular uptake seen. 20 mg of Dexamethasone mixed with 3 mL  Lidocaine 1% were injected.  Estimated blood loss during the procedure  was less than 5 mL. No specimens collected. No initial complication.         Fluoroscopy time: 1.3 minutes  Images Obtained: 3  Meds Used: 4mL 1% Lidocaine local, 0.5 mL Isovue M 200,  2 mL  Dexamethazone, 3 mL 1% Lidocaine    Under my supervision, 2 mg Versed and 100 mcg Fentanyl administered  intravenously for moderate sedation by the nurse. Level of  consciousness, pulse oximetry, heart rate, and BP are continuously  monitored by an independent trained observer present. I spent 30  minutes of face to face sedation time with the patient. No  complications. The patient's blood pressure did drop at the end of the  case. She was rolled to  her back onto her cart N monitored for 2-3  more minutes. Subsequent blood pressure checks were done and tell her  systolic pressure returned to 100-110 ml of Hg. She returned to the  floor in stable condition.    The patient's pain levels (0-10 scale) were as follows:                          PRE INJECTION      Low back                0                                              Right leg                 0                                               Left leg                    10                                                  POST INJECTION   Low back               0   Right leg                0   Left leg                   0        Impression    Impression:  Technically successful left L3-4 transforaminal lumbar  epidural steroid injection with favorable initial pain relief. Long  term results pending.    ALYSSA NICHOLSON PA-C         SYSTEM ID:  [id number]   MR Lumbar Spine w/o Contrast    Narrative    EXAM: MR LUMBAR SPINE W/O CONTRAST  LOCATION: [facility]  DATE/TIME: 1/11/2023 6:29 PM    INDICATION: Low back pain; Low back pain, no complicating feature; No chronic LBP duration >= 3 months  COMPARISON: 10/12/2010  TECHNIQUE: Routine Lumbar Spine MRI without IV contrast.    FINDINGS:   Nomenclature is based on 5 lumbar type vertebral bodies. There are interval postsurgical changes including posterior spinal fusion at L4-L5. Extensive streak artifact from surgical hardware at these levels limits evaluation. There is 2 to 3 mm   retrolisthesis of L1 on L2 and L2 on L3. Normal vertebral body heights and marrow signal. Normal distal spinal cord and cauda equina with conus medullaris at T12-L1. No extraspinal abnormality. Unremarkable visualized bony pelvis.    T12-L1: There is disc desiccation and mild diffuse disc bulge. Bilateral facet hypertrophy. No spinal canal or neural foraminal stenosis.     L1-L2: There is disc desiccation and diffuse disc bulge. Bilateral facet hypertrophy. Mild  spinal canal stenosis. Moderate bilateral neural foraminal stenosis. Mild to moderate bilateral subarticular stenosis.    L2-L3: There is disc desiccation and diffuse disc bulge. Bilateral ligament flavum and facet hypertrophy. Mild spinal canal stenosis. Severe bilateral neural foraminal stenosis with disc material contacting and possibly compressing the exiting bilateral   L2 nerve roots. Moderate bilateral subarticular stenosis.     L3-L4: Limited evaluation secondary to streak artifact. There is diffuse disc bulge. Suspect severe spinal canal stenosis.    L4-L5: Not visualized secondary to streak artifact.    L5-S1: Limited evaluation secondary to streak artifact. There is diffuse disc bulge. There is bilateral facet hypertrophy. Suspect mild spinal canal stenosis. Neural foramen not well evaluated.      Impression    IMPRESSION:  1.  Interval postsurgical changes. Extensive streak artifact limits evaluation.  2.  Multilevel disc degeneration and facet hypertrophy as described above. Mild spinal canal stenosis at L1-L2 and L2-L3. Suspect severe spinal canal stenosis at L3-L4 on this is limited in evaluation.  3.  Severe bilateral neural foraminal stenoses at L2-L3.   XR Femur Left 2 Views    Narrative    EXAM:  FEMUR LEFT TWO VIEWS  DATE/TIME: 1/13/2023 9:59 AM    INDICATION: Pain, unable to weight bear.    COMPARISON: None available.       Impression    IMPRESSION: Anatomic alignment left femur. No acute displaced left  femur fracture. Mild left hip and moderate left knee osteoarthritis.  No appreciable left knee joint effusion. Arterial calcification.       KRIS MAGALLANES MD         SYSTEM ID:  [id number]   XR Pelvis w Hip Left 1 View    Narrative    PELVIS AND LEFT HIP, ONE VIEW  1/13/2023 9:58 AM    INDICATION: Pain, unable to weight-bear.    COMPARISON: None available.       Impression    IMPRESSION: Anatomic alignment left hip. No acute displaced left hip  fracture. Mild bilateral hip osteoarthritis. No  acute displaced pelvic  fracture. Symmetric SI joints. Postop and degenerative change lower  lumbar spine.    KRIS MAGALLANES MD         SYSTEM ID:  [id number]       Discharge Medications   Current Discharge Medication List      START taking these medications    Details   acetaminophen (TYLENOL) 325 MG tablet Take 3 tablets (975 mg) by mouth every 8 hours    Associated Diagnoses: Lumbar back pain with radiculopathy affecting left lower extremity      cyclobenzaprine (FLEXERIL) 5 MG tablet Take 1 tablet (5 mg) by mouth 3 times daily as needed for muscle spasms  Qty: 15 tablet, Refills: 0    Associated Diagnoses: Lumbar back pain with radiculopathy affecting left lower extremity      insulin lispro (HUMALOG KWIKPEN) 100 UNIT/ML (1 unit dial) KWIKPEN Inject insulin three times daily before meals and bedtime:  If Pre-meal Glucose  140 -164 give 1 unit  165 -189 give 2 units  190 -214 give 3 units  215 -239 give 4 units  240 -264 give 5 units  265 -289 give 6 units  290 -314 give 7 units  315 -339 give 8 units  340+ give 9 units    If Bedtime Glucose  200 -214 give 1 unit  215 -264 give 2 units  265 -314 give 3 units  315+ give 4 units  Qty: 15 mL    Associated Diagnoses: Type 2 diabetes mellitus with diabetic polyneuropathy, with long-term current use of insulin (H)      oxyCODONE (ROXICODONE) 5 MG tablet Take 1-2 tablets (5-10 mg) by mouth every 6 hours as needed for pain (IF pain not managed with non-pharmacological and non-opioid interventions) Give 5 mg for moderate pain (4-6), give 10 mg for moderate to severe pain (7-10)  Qty: 16 tablet, Refills: 0    Associated Diagnoses: Lumbar back pain with radiculopathy affecting left lower extremity         CONTINUE these medications which have NOT CHANGED    Details   amLODIPine (NORVASC) 10 MG tablet Take 10 mg by mouth daily      DULoxetine (CYMBALTA) 60 MG capsule Take 60 mg by mouth daily      gabapentin (NEURONTIN) 300 MG capsule Take 900 mg by mouth 2 times daily       gatifloxacin (ZYMAXID) 0.5 % ophthalmic solution Place 1 drop into both eyes 4 times daily      insulin glargine (LANTUS VIAL) 100 UNIT/ML soln Inject 32 Units Subcutaneous every morning      insulin lispro protamine-insulin lispro (HUMALOG MIX 75/25 KWIKPEN) (75-25) 100 UNIT/ML pen Inject Subcutaneous 2 times daily (before meals) 50 units with breakfast in the morning and 60 units with dinner in the evening      ketorolac (ACULAR) 0.5 % ophthalmic solution Place 1 drop into both eyes 4 times daily      levothyroxine (SYNTHROID/LEVOTHROID) 100 MCG tablet Take 100 mcg by mouth daily      lisinopril (ZESTRIL) 30 MG tablet Take 30 mg by mouth daily      prednisoLONE acetate (PRED FORTE) 1 % ophthalmic suspension Place 1 drop into both eyes 4 times daily      rosuvastatin (CRESTOR) 10 MG tablet Take 10 mg by mouth At Bedtime      terbinafine (LAMISIL) 250 MG tablet Take 250 mg by mouth Daily for one week each month      triamcinolone (KENALOG) 0.025 % external ointment Apply topically 2 times daily To lower legs         STOP taking these medications       empagliflozin (JARDIANCE) 25 MG TABS tablet Comments:   Reason for Stopping:             Allergies   Allergies   Allergen Reactions     Hydrocodone-Acetaminophen      constipated

## 2023-01-14 NOTE — PLAN OF CARE
Orientation/Cognitive: A&Ox4, forgetful.   Observation Goals (Met/ Not Met): Not met  Mobility Level/Assist: A2/walker/gb; Pivot to chair    Fall Risk (Y/N): Yes  Behavior Concerns: None  Pain Management: C/O 9/10 pain PRN oxy; scheduled gabapentin.   Tele/VS/O2: VSS on RA   ABNL Lab/BG:   Diet: Mod CHO  Bowel/Bladder: Cont. Constipation, bowel regimen in place. Purewic in place  Skin Concerns: BLE edema, L knee abrasion. Sacral mepilex-CDI. CHANTAL Band-Aid CDI   CMS: baseline numbness BLE   Drains/Devices: PIV SL  Tests/Procedures for next shift: N/A  Anticipated DC date & active delays: TBD   Other: SW/neurosurgery/OT/PT following. Xray pelvis/hip/femur   Patient Stated Goal for Today: pain control rest/sleep      Observation Goals:     -diagnostic tests and consults completed and resulted - in progress     -vital signs normal or at patient baseline -met      -adequate pain control on oral analgesics - not met. 9/10 pain-improving

## 2023-01-14 NOTE — PROGRESS NOTES
Care Management Discharge Note    Discharge Date: 01/14/2023       Discharge Disposition:  Transitional care    Discharge Services:   Transitional care  Discharge DME:  None    Discharge Transportation: Short stay staff through [facility]    Private pay costs discussed: Not applicable    PAS Confirmation Code:   [id number]  Patient/family educated on Medicare website which has current facility and service quality ratings:  yes    Education Provided on the Discharge Plan:  yes  Persons Notified of Discharge Plans: patient  Patient/Family in Agreement with the Plan:  yes    Handoff Referral Completed: Yes    Additional Information:  Patient discharging to [facility] at 1230 via [facility] with short stay staff. Writer let Lorelei and patient's dtr (Janet) know of this time. Writer completed PAS, put in chart and sent to facility.  Writer faxed scripts and orders to [facility].    PAS-RR    D: Per DHS regulation, SW completed and submitted PAS-RR to [place] [employer] via the Senior LinkAge Line.  PAS-RR confirmation # is :[id number]    I: SW spoke with patient and they are aware a PAS-RR has been submitted.  SW reviewed with patientthat they may be contacted for a follow up appointment within 10 days of hospital discharge if their SNF stay is < 30 days.  Contact information for Senior LinkAge Line was also provided.    A: Patient verbalized understanding.    P: Further questions may be directed to [facility] LinkAge Line at #[phone], option #4 for PAS-RR staff.    BERENICE Matias

## 2023-01-14 NOTE — PROGRESS NOTES
Orientation/Cognitive: A&Ox.   Observation Goals (Met/ Not Met): Not met  Mobility Level/Assist: A2/walker/gb; Sleeping in bed                  Fall Risk (Y/N): Yes  Behavior Concerns: None  Pain Management: C/O 7/10 pain PRN oxy; scheduled gabapentin.   Tele/VS/O2: VSS on RA   ABNL Lab/BG: None  Diet: Mod CHO  Bowel/Bladder: Cont. Constipation, bowel regimen in place. Purewic in place  Skin Concerns: BLE edema, L knee abrasion. Sacral mepilex-CDI. CHANTAL Band-Aid CDI   CMS: baseline numbness BLE   Drains/Devices: PIV SL  Tests/Procedures for next shift: N/A  Anticipated DC date & active delays: TBD   Other: SW/neurosurgery/OT/PT following. Xray pelvis/hip/femur   Patient Stated Goal for Today: pain control rest/sleep        Observation Goals:     -diagnostic tests and consults completed and resulted - in progress     -vital signs normal or at patient baseline -met      -adequate pain control on oral analgesics - not met. 7/10 pain-improving

## 2023-01-14 NOTE — PLAN OF CARE
Orientation/Cognitive: A&Ox4, forgetful.   Observation Goals (Met/ Not Met): Not met  Mobility Level/Assist: A2/walker/gb; Pivot to BSC  Fall Risk (Y/N): Yes  Behavior Concerns: None  Pain Management: C/O 9/10 pain PRN flexeril x1; scheduled tylenol and gabapentin. Refused oxy. No nonverbal indicators of pain  Tele/VS/O2: VSS on RA ex HTN  ABNL Lab/BG: , 281, 264  Diet: Mod CHO  Bowel/Bladder: Cont. Constipation, bowel regimen in place. Purewic in place  Skin Concerns: BLE edema, L knee abrasion. Sacral mepilex-CDI. CHANTAL Band-Aid CDI   CMS: baseline numbness BLE   Drains/Devices: PIV SL  Tests/Procedures for next shift: N/A  Anticipated DC date & active delays: TBD pending improvement, to TCU.  Other: SW/neurosurgery/OT/PT following. Xray pelvis/hip/femur   Patient Stated Goal for Today: pain control      Observation Goals:     -diagnostic tests and consults completed and resulted - in progress     -vital signs normal or at patient baseline -met      -adequate pain control on oral analgesics - not met. 9/10 pain-improving

## 2023-01-14 NOTE — PROGRESS NOTES
[facility]    Neurosurgery  Daily Note    Assessment & Plan   Procedure(s):  Underwent L3-4 CHANTAL on Thursday.  CHANTAL may take up to 7 days to reach maximum benefit    Recommend follow up with [facility] as outpatient if she continues to be symptomatic.     [phone] for appts.       Adria Xiao PA-C    Interval History   Stable.      Physical Exam   Temp: 97.8  F (36.6  C) Temp src: Oral BP: 110/46 Pulse: 71   Resp: 18 SpO2: 97 % O2 Device: None (Room air)    Vitals:    01/09/23 2226   Weight: 110.2 kg (243 lb)     Vital Signs with Ranges  Temp:  [97.6  F (36.4  C)-98.6  F (37  C)] 97.8  F (36.6  C)  Pulse:  [68-84] 71  Resp:  [16-18] 18  BP: (110-138)/(46-66) 110/46  SpO2:  [95 %-98 %] 97 %  I/O last 3 completed shifts:  In: -   Out: 500 [Urine:500]        Medications        acetaminophen  975 mg Oral Q8H     amLODIPine  10 mg Oral Daily     gabapentin  900 mg Oral BID     gatifloxacin  1 drop Both Eyes 4x Daily     insulin aspart  1-10 Units Subcutaneous TID AC     insulin aspart  1-7 Units Subcutaneous At Bedtime     insulin aspart prot & aspart  50 Units Subcutaneous Daily with supper     insulin aspart prot & aspart  50 Units Subcutaneous QAM AC     insulin glargine  32 Units Subcutaneous QAM     ketorolac  1 drop Both Eyes 4x Daily     levothyroxine  100 mcg Oral Daily     lisinopril  30 mg Oral Daily     polyethylene glycol  17 g Oral Daily     prednisoLONE acetate  1 drop Both Eyes 4x Daily     sennosides  1 tablet Oral BID           Adria Xiao PA-C  [facility]  [address]  [address]  [place] [address]    Tel [phone]  Pager [phone]

## 2023-01-15 ENCOUNTER — LAB REQUISITION (OUTPATIENT)
Dept: LAB | Facility: CLINIC | Age: 78
End: 2023-01-15

## 2023-01-15 DIAGNOSIS — Z00.01 ENCOUNTER FOR GENERAL ADULT MEDICAL EXAMINATION WITH ABNORMAL FINDINGS: ICD-10-CM

## 2023-01-16 ENCOUNTER — TELEPHONE (OUTPATIENT)
Dept: NEUROSURGERY | Facility: CLINIC | Age: 78
End: 2023-01-16

## 2023-01-16 ENCOUNTER — TRANSITIONAL CARE UNIT VISIT (OUTPATIENT)
Dept: GERIATRICS | Facility: CLINIC | Age: 78
End: 2023-01-16
Payer: MEDICARE

## 2023-01-16 ENCOUNTER — PATIENT OUTREACH (OUTPATIENT)
Dept: CARE COORDINATION | Facility: CLINIC | Age: 78
End: 2023-01-16

## 2023-01-16 VITALS
HEART RATE: 78 BPM | WEIGHT: 242.2 LBS | SYSTOLIC BLOOD PRESSURE: 138 MMHG | DIASTOLIC BLOOD PRESSURE: 71 MMHG | RESPIRATION RATE: 18 BRPM | BODY MASS INDEX: 45.76 KG/M2 | TEMPERATURE: 97.9 F | OXYGEN SATURATION: 98 %

## 2023-01-16 DIAGNOSIS — Z79.4 TYPE 2 DIABETES MELLITUS WITH BOTH EYES AFFECTED BY MODERATE NONPROLIFERATIVE RETINOPATHY AND MACULAR EDEMA, WITH LONG-TERM CURRENT USE OF INSULIN (H): ICD-10-CM

## 2023-01-16 DIAGNOSIS — E03.9 HYPOTHYROIDISM, UNSPECIFIED TYPE: ICD-10-CM

## 2023-01-16 DIAGNOSIS — M54.42 ACUTE LEFT-SIDED LOW BACK PAIN WITH LEFT-SIDED SCIATICA: Primary | ICD-10-CM

## 2023-01-16 DIAGNOSIS — E66.813 CLASS 3 SEVERE OBESITY DUE TO EXCESS CALORIES WITH SERIOUS COMORBIDITY AND BODY MASS INDEX (BMI) OF 40.0 TO 44.9 IN ADULT (H): ICD-10-CM

## 2023-01-16 DIAGNOSIS — E11.3313 TYPE 2 DIABETES MELLITUS WITH BOTH EYES AFFECTED BY MODERATE NONPROLIFERATIVE RETINOPATHY AND MACULAR EDEMA, WITH LONG-TERM CURRENT USE OF INSULIN (H): ICD-10-CM

## 2023-01-16 DIAGNOSIS — E78.5 DYSLIPIDEMIA: ICD-10-CM

## 2023-01-16 DIAGNOSIS — R39.9 URINARY TRACT INFECTION SYMPTOMS: ICD-10-CM

## 2023-01-16 DIAGNOSIS — E66.01 CLASS 3 SEVERE OBESITY DUE TO EXCESS CALORIES WITH SERIOUS COMORBIDITY AND BODY MASS INDEX (BMI) OF 40.0 TO 44.9 IN ADULT (H): ICD-10-CM

## 2023-01-16 DIAGNOSIS — M48.00 SPINAL STENOSIS, UNSPECIFIED SPINAL REGION: ICD-10-CM

## 2023-01-16 DIAGNOSIS — Z71.89 ADVANCED DIRECTIVES, COUNSELING/DISCUSSION: ICD-10-CM

## 2023-01-16 DIAGNOSIS — R53.81 PHYSICAL DECONDITIONING: ICD-10-CM

## 2023-01-16 DIAGNOSIS — I10 HYPERTENSION, UNSPECIFIED TYPE: ICD-10-CM

## 2023-01-16 PROCEDURE — 36415 COLL VENOUS BLD VENIPUNCTURE: CPT | Performed by: NURSE PRACTITIONER

## 2023-01-16 PROCEDURE — 99310 SBSQ NF CARE HIGH MDM 45: CPT | Performed by: NURSE PRACTITIONER

## 2023-01-16 PROCEDURE — 86481 TB AG RESPONSE T-CELL SUSP: CPT | Performed by: NURSE PRACTITIONER

## 2023-01-16 NOTE — PROGRESS NOTES
[facility] GERIATRICS    PRIMARY CARE PROVIDER AND CLINIC:  Myra Tesfaye DO, [facility] [address] / [place]  Chief Complaint   Patient presents with     Hospital F/U      [facility] Record Number:  [id number]  Place of Service where encounter took place:  [facility] (TCU) [[id number]]    Coretta Kolb  is a 77 year old  (1945), admitted to the above facility from  [facility]. Hospital stay 1/9/23 through 1/14/23.  HPI:    77 year old female PMH DM2 (on insulin Lantus and Novolog), HTN on atenolol and lisinopril, hypothyroid on synthroid, ?cognitive impairment hospitalized with left lower back and left lateral thigh pain due to suspected L3-4 radiculopathy and severe spinal canal stenosis. Has history L2-L3 hemilaminectomy and discectomy, L3-5 laminectomy and foraminotomies bilaterally and L4-5 spinal fusion in November 2010. NSGY: epidural steroid injection completed on 1/13, symptoms somewhat improved. On tylenol, flexeril and oxycodone. ?UTI completed three days of rocephin. To TCU for rehab.     Patient seen for initial TCU visit. Requests Full Code status. Pain present but improving gradually. No headaches, dizziness, chest pain, dyspnea, bowel or bladder issues. BP range 120-170/61-78 and sats 97% room air. BG  daily since admission.     CODE STATUS/ADVANCE DIRECTIVES DISCUSSION:  Prior  CPR/Full code   ALLERGIES:   Allergies   Allergen Reactions     Hydrocodone-Acetaminophen      constipated      PAST MEDICAL HISTORY:   Past Medical History:   Diagnosis Date     Diabetes (H)      Hypertension      Hypothyroid       PAST SURGICAL HISTORY:   has a past surgical history that includes orthopedic surgery and Anesthesia out of OR MRI (N/A, 1/11/2023).  FAMILY HISTORY: family history is not on file.  SOCIAL HISTORY:   reports that she has never smoked. She has never been exposed to tobacco smoke. She does not have any smokeless  tobacco history on file. She reports current alcohol use. She reports that she does not use drugs.  Patient's living condition: lives alone    Post Discharge Medication Reconciliation Status:   MED REC REQUIRED  Post Medication Reconciliation Status:  Discharge medications reconciled, continue medications without change         Current Outpatient Medications   Medication Sig     acetaminophen (TYLENOL) 325 MG tablet Take 3 tablets (975 mg) by mouth every 8 hours     amLODIPine (NORVASC) 10 MG tablet Take 10 mg by mouth daily     cyclobenzaprine (FLEXERIL) 5 MG tablet Take 1 tablet (5 mg) by mouth 3 times daily as needed for muscle spasms     DULoxetine (CYMBALTA) 60 MG capsule Take 60 mg by mouth daily     gabapentin (NEURONTIN) 300 MG capsule Take 900 mg by mouth 2 times daily     gatifloxacin (ZYMAXID) 0.5 % ophthalmic solution Place 1 drop into both eyes 4 times daily     insulin glargine (LANTUS VIAL) 100 UNIT/ML soln Inject 32 Units Subcutaneous every morning     insulin lispro (HUMALOG KWIKPEN) 100 UNIT/ML (1 unit dial) KWIKPEN Inject insulin three times daily before meals and bedtime:  If Pre-meal Glucose  140 -164 give 1 unit  165 -189 give 2 units  190 -214 give 3 units  215 -239 give 4 units  240 -264 give 5 units  265 -289 give 6 units  290 -314 give 7 units  315 -339 give 8 units  340+ give 9 units    If Bedtime Glucose  200 -214 give 1 unit  215 -264 give 2 units  265 -314 give 3 units  315+ give 4 units     insulin lispro protamine-insulin lispro (HUMALOG MIX 75/25 KWIKPEN) (75-25) 100 UNIT/ML pen Inject Subcutaneous 2 times daily (before meals) 50 units with breakfast in the morning and 60 units with dinner in the evening     ketorolac (ACULAR) 0.5 % ophthalmic solution Place 1 drop into both eyes 4 times daily     levothyroxine (SYNTHROID/LEVOTHROID) 100 MCG tablet Take 100 mcg by mouth daily     lisinopril (ZESTRIL) 30 MG tablet Take 30 mg by mouth daily     oxyCODONE (ROXICODONE) 5 MG tablet Take  1-2 tablets (5-10 mg) by mouth every 6 hours as needed for pain (IF pain not managed with non-pharmacological and non-opioid interventions) Give 5 mg for moderate pain (4-6), give 10 mg for moderate to severe pain (7-10)     prednisoLONE acetate (PRED FORTE) 1 % ophthalmic suspension Place 1 drop into both eyes 4 times daily     rosuvastatin (CRESTOR) 10 MG tablet Take 10 mg by mouth At Bedtime     terbinafine (LAMISIL) 250 MG tablet Take 250 mg by mouth Daily for one week each month     triamcinolone (KENALOG) 0.025 % external ointment Apply topically 2 times daily To lower legs     No current facility-administered medications for this visit.       ROS:  10 point ROS of systems including Constitutional, Eyes, Respiratory, Cardiovascular, Gastroenterology, Genitourinary, Integumentary, Musculoskeletal, Psychiatric were all negative except for pertinent positives noted in my HPI.    Vitals:  /71   Pulse 78   Temp 97.9  F (36.6  C)   Resp 18   Wt 109.9 kg (242 lb 3.2 oz)   SpO2 98%   BMI 45.76 kg/m    Exam:  GENERAL APPEARANCE:  Alert, in no distress, pleasant, cooperative, oriented x 4  EYES:  EOM, lids, pupils and irises normal, sclera clear and conjunctiva normal, no discharge or mattering on lids or lashes noted  ENT:  Mouth normal, moist mucous membranes, nose normal without drainage or crusting, external ears without lesions, hearing acuity intact  NECK: supple, symmetrical, trachea midline  RESP:  respiratory effort normal, no chest wall tenderness, no respiratory distress, Lung sounds clear, patient is on RA  CV:  Auscultation of heart done, rate and rhythm controlled and regular, no murmur, no rub or gallop. Edema none bilateral lower extremities.  ABDOMEN:  normal bowel sounds, soft, nontender, no palpable masses.  M/S:   Gait and station walks with assist , no tenderness or swelling of the joints; able to move all extremities, digits normal  NEURO: cranial nerves 2-12 grossly intact, no facial  asymmetry, no speech deficits and able to follow directions, moves all extremities symmetrically  PSYCH:  insight and judgement and memory intact, affect and mood normal     Lab/Diagnostic data:    Most Recent 3 CBC's:  Recent Labs   Lab Test 01/10/23  0734 01/09/23  1104 11/30/22  0920   WBC 10.0 12.8* 8.3   HGB 12.7 14.3 15.2   MCV 89 89 89    290 222     Most Recent 3 BMP's:  Recent Labs   Lab Test 01/14/23  1204 01/14/23  0801 01/14/23  0151 01/09/23  1824 01/09/23  1104 11/30/22  1121 11/30/22  0920   NA  --   --   --   --  134  --  135   POTASSIUM  --   --   --   --  4.1  --  4.6   CHLORIDE  --   --   --   --  103  --  103   CO2  --   --   --   --  23  --  23   BUN  --   --   --   --  22  --  18   CR  --   --   --   --  0.62  --  0.67   ANIONGAP  --   --   --   --  8  --  9   KATERIN  --   --   --   --  9.6  --  9.8   * 153* 147*   < > 273*   < > 432*    < > = values in this interval not displayed.       Most Recent Hemoglobin A1c:  Recent Labs   Lab Test 11/30/22  0920   A1C 9.8*       ASSESSMENT/PLAN:  Acute left-sided low back pain with left-sided sciatica  Spinal stenosis, unspecified spinal region  Physical deconditioning  Acute on chronic, ongoing. Continue tylenol 975 mg TID, flexeril 5 mg TID PRN, Cymbalta 60 mg daily, Neurontin 900 mg BID, oxycodone 5-10 mg every 6 hrs PRN, therapies as ordered and f/u with progress. Neurosurgery f/u as recommended.     Type 2 diabetes mellitus with both eyes affected by moderate nonproliferative retinopathy and macular edema, with long-term current use of insulin (H)  Chronic, not well managed. Continue Zymaxid eye drops, Pred Forte eye drops and Acular drops per home routine, Lantus 32 units daily, Lispro sliding scale QID, Humalog 75/25 50 units in AM and 60 units with dinner, monitor BG QID and review ranges next visit.     Class 3 severe obesity due to excess calories with serious comorbidity and body mass index (BMI) of 40.0 to 44.9 in adult  (H)  Chronic. Monitor for safety. Encourage wt loss.     Hypertension, unspecified type  Chronic, continue Norvasc 10 mg daily, lisinopril 30 mg daily, monitor vs and review ranges next visit. BMP check 1/18.     Dyslipidemia  Continue PTA Crestor 10 mg daily    Hypothyroidism, unspecified type  Continue synthroid 100 mcg daily    Urinary tract infection symptoms  Resolved. Monitor for any new symptoms.     Advanced directives, counseling/discussion  Full Code desired    Orders:  1. Full Code  2. BG check QID diagnosis DM  3. BMP on 1/18 diagnosis HTN    Total unit/floor time of 46 minutes spent consisted of the following: examination of patient, reviewing the record including pertinent labs and imaging. More than 50% of this time was spent in coordination of care with the patient, nursing staff, and other healthcare providers. This time was spent on discussing the care plan including labs, discharge/safe placement, and specialty follow up. Additional specific discussion included review of code status, pain management, diabetic monitoring and management, needed follow up, expected TCU course/routine/discharge planning and clarification of meds/orders with nursing for a total of over 24 min.    Electronically signed by:  HANY Tejeda CNP

## 2023-01-16 NOTE — PLAN OF CARE
Occupational Therapy Discharge Summary    Reason for therapy discharge:    Discharged to transitional care facility.    Progress towards therapy goal(s). See goals on Care Plan in [facility] electronic health record for goal details.  Goals partially met.  Barriers to achieving goals:   discharge from facility.    Therapy recommendation(s):    Continued therapy is recommended.  Rationale/Recommendations:  COnt. with ADL/IADL retraining at TCU setting.

## 2023-01-16 NOTE — CARE PLAN
Physical Therapy Discharge Summary    Reason for therapy discharge:    Discharged to transitional care facility.    Progress towards therapy goal(s). See goals on Care Plan in [facility] electronic health record for goal details.  Goals partially met.  Barriers to achieving goals:   discharge from facility.    Therapy recommendation(s):    Continued therapy is recommended.  Rationale/Recommendations:  To maximize functional mob I, safety and liyah.

## 2023-01-16 NOTE — TELEPHONE ENCOUNTER
Patient needs a 2 week post hospital f/u . Writer spoke with family, they will discuss with patient and call back for scheduling purposes.

## 2023-01-16 NOTE — PROGRESS NOTES
[facility]    Background: Transitional Care Management program identified per system criteria and reviewed by [facility] team for possible outreach.    Assessment: Upon chart review, CCR Team member will not proceed with patient outreach related to this episode of Transitional Care Management program due to reason below:    Non-[facility] TCU: Patient is not established within [facility] and CCRC team member noted patient discharged to TCU/ARU/LTACH.    Plan: Transitional Care Management episode addressed appropriately per reason noted above.      LEEANNE Lo  [phone]  [facility]    *Connected Care Resource Team does NOT follow patient ongoing. Referrals are identified based on internal discharge reports and the outreach is to ensure patient has an understanding of their discharge instructions.

## 2023-01-17 ENCOUNTER — LAB REQUISITION (OUTPATIENT)
Dept: LAB | Facility: CLINIC | Age: 78
End: 2023-01-17

## 2023-01-17 DIAGNOSIS — E09.00 DRUG OR CHEMICAL INDUCED DIABETES MELLITUS WITH HYPEROSMOLARITY WITHOUT NONKETOTIC HYPERGLYCEMIC-HYPEROSMOLAR COMA (NKHHC) (H): ICD-10-CM

## 2023-01-17 LAB
GAMMA INTERFERON BACKGROUND BLD IA-ACNC: 0.02 IU/ML
M TB IFN-G BLD-IMP: NEGATIVE
M TB IFN-G CD4+ BCKGRND COR BLD-ACNC: 9.98 IU/ML
MITOGEN IGNF BCKGRD COR BLD-ACNC: 0 IU/ML
MITOGEN IGNF BCKGRD COR BLD-ACNC: 0.01 IU/ML
QUANTIFERON MITOGEN: 10 IU/ML
QUANTIFERON NIL TUBE: 0.02 IU/ML
QUANTIFERON TB1 TUBE: 0.03 IU/ML
QUANTIFERON TB2 TUBE: 0.02

## 2023-01-18 PROBLEM — E66.01 CLASS 3 SEVERE OBESITY DUE TO EXCESS CALORIES WITH SERIOUS COMORBIDITY AND BODY MASS INDEX (BMI) OF 40.0 TO 44.9 IN ADULT (H): Status: ACTIVE | Noted: 2023-01-18

## 2023-01-18 PROBLEM — E66.813 CLASS 3 SEVERE OBESITY DUE TO EXCESS CALORIES WITH SERIOUS COMORBIDITY AND BODY MASS INDEX (BMI) OF 40.0 TO 44.9 IN ADULT (H): Status: ACTIVE | Noted: 2023-01-18

## 2023-01-18 PROBLEM — Z79.4 TYPE 2 DIABETES MELLITUS WITH BOTH EYES AFFECTED BY MODERATE NONPROLIFERATIVE RETINOPATHY AND MACULAR EDEMA, WITH LONG-TERM CURRENT USE OF INSULIN (H): Status: ACTIVE | Noted: 2023-01-18

## 2023-01-18 PROBLEM — E11.3313 TYPE 2 DIABETES MELLITUS WITH BOTH EYES AFFECTED BY MODERATE NONPROLIFERATIVE RETINOPATHY AND MACULAR EDEMA, WITH LONG-TERM CURRENT USE OF INSULIN (H): Status: ACTIVE | Noted: 2023-01-18

## 2023-01-18 LAB
ANION GAP SERPL CALCULATED.3IONS-SCNC: 9 MMOL/L (ref 7–15)
BUN SERPL-MCNC: 20.2 MG/DL (ref 8–23)
CALCIUM SERPL-MCNC: 9.6 MG/DL (ref 8.8–10.2)
CHLORIDE SERPL-SCNC: 103 MMOL/L (ref 98–107)
CREAT SERPL-MCNC: 0.64 MG/DL (ref 0.51–0.95)
DEPRECATED HCO3 PLAS-SCNC: 24 MMOL/L (ref 22–29)
GFR SERPL CREATININE-BSD FRML MDRD: >90 ML/MIN/1.73M2
GLUCOSE SERPL-MCNC: 119 MG/DL (ref 70–99)
POTASSIUM SERPL-SCNC: 4.2 MMOL/L (ref 3.4–5.3)
SODIUM SERPL-SCNC: 136 MMOL/L (ref 136–145)

## 2023-01-18 PROCEDURE — 80048 BASIC METABOLIC PNL TOTAL CA: CPT | Performed by: NURSE PRACTITIONER

## 2023-01-18 PROCEDURE — 36415 COLL VENOUS BLD VENIPUNCTURE: CPT | Performed by: NURSE PRACTITIONER

## 2023-01-18 PROCEDURE — P9604 ONE-WAY ALLOW PRORATED TRIP: HCPCS | Performed by: NURSE PRACTITIONER

## 2023-01-19 ENCOUNTER — TRANSITIONAL CARE UNIT VISIT (OUTPATIENT)
Dept: GERIATRICS | Facility: CLINIC | Age: 78
End: 2023-01-19
Payer: MEDICARE

## 2023-01-19 VITALS
WEIGHT: 235.4 LBS | DIASTOLIC BLOOD PRESSURE: 73 MMHG | BODY MASS INDEX: 44.48 KG/M2 | OXYGEN SATURATION: 93 % | HEART RATE: 79 BPM | SYSTOLIC BLOOD PRESSURE: 152 MMHG | RESPIRATION RATE: 18 BRPM | TEMPERATURE: 97.9 F

## 2023-01-19 DIAGNOSIS — Z79.4 TYPE 2 DIABETES MELLITUS WITH BOTH EYES AFFECTED BY MODERATE NONPROLIFERATIVE RETINOPATHY AND MACULAR EDEMA, WITH LONG-TERM CURRENT USE OF INSULIN (H): ICD-10-CM

## 2023-01-19 DIAGNOSIS — E11.3313 TYPE 2 DIABETES MELLITUS WITH BOTH EYES AFFECTED BY MODERATE NONPROLIFERATIVE RETINOPATHY AND MACULAR EDEMA, WITH LONG-TERM CURRENT USE OF INSULIN (H): ICD-10-CM

## 2023-01-19 DIAGNOSIS — I10 HYPERTENSION, UNSPECIFIED TYPE: ICD-10-CM

## 2023-01-19 DIAGNOSIS — E78.5 DYSLIPIDEMIA: ICD-10-CM

## 2023-01-19 DIAGNOSIS — R39.9 URINARY TRACT INFECTION SYMPTOMS: ICD-10-CM

## 2023-01-19 DIAGNOSIS — R53.81 PHYSICAL DECONDITIONING: ICD-10-CM

## 2023-01-19 DIAGNOSIS — E66.813 CLASS 3 SEVERE OBESITY DUE TO EXCESS CALORIES WITH SERIOUS COMORBIDITY AND BODY MASS INDEX (BMI) OF 40.0 TO 44.9 IN ADULT (H): ICD-10-CM

## 2023-01-19 DIAGNOSIS — E03.9 HYPOTHYROIDISM, UNSPECIFIED TYPE: ICD-10-CM

## 2023-01-19 DIAGNOSIS — M54.42 ACUTE LEFT-SIDED LOW BACK PAIN WITH LEFT-SIDED SCIATICA: Primary | ICD-10-CM

## 2023-01-19 DIAGNOSIS — M48.00 SPINAL STENOSIS, UNSPECIFIED SPINAL REGION: ICD-10-CM

## 2023-01-19 DIAGNOSIS — E66.01 CLASS 3 SEVERE OBESITY DUE TO EXCESS CALORIES WITH SERIOUS COMORBIDITY AND BODY MASS INDEX (BMI) OF 40.0 TO 44.9 IN ADULT (H): ICD-10-CM

## 2023-01-19 PROCEDURE — 99309 SBSQ NF CARE MODERATE MDM 30: CPT | Performed by: NURSE PRACTITIONER

## 2023-01-19 NOTE — PROGRESS NOTES
[facility] GERIATRICS    Chief Complaint   Patient presents with     RECHECK     HPI:  Coretta Kolb is a 77 year old  (1945), who is being seen today for an episodic care visit at: [facility] (TC[facility]) [[id number]].     Per recent TCU provider progress notes:   77 year old female PMH DM2 (on insulin Lantus and Novolog), HTN on atenolol and lisinopril, hypothyroid on synthroid, ?cognitive impairment hospitalized with left lower back and left lateral thigh pain due to suspected L3-4 radiculopathy and severe spinal canal stenosis. Has history L2-L3 hemilaminectomy and discectomy, L3-5 laminectomy and foraminotomies bilaterally and L4-5 spinal fusion in November 2010. NSGY: epidural steroid injection completed on 1/13, symptoms somewhat improved. On tylenol, flexeril and oxycodone. ?UTI completed three days of rocephin. To TCU for rehab.     Today's concern is: seen for f/u eye drop questions, mobility, pain, spinal imaging. Reports eye surgery two months ago, currently ordered eye drops should have been completed prior to admission asks to stop. Questions whether she could review spinal imaging with ortho PA - will ask Davi to visit patient if possible. No other concerns, pain managed adequately.     Allergies, and PMH/PSH reviewed in [employer] today.  REVIEW OF SYSTEMS:  4 point ROS including Respiratory, CV, GI and , other than that noted in the HPI,  is negative    Objective:   BP (!) 152/73   Pulse 79   Temp 97.9  F (36.6  C)   Resp 18   Wt 106.8 kg (235 lb 6.4 oz)   SpO2 93%   BMI 44.48 kg/m    GENERAL APPEARANCE:  Alert, in no distress, cooperative  ENT:  Mouth normal, moist mucous membranes, normal hearing acuity  EYES:  Conjunctiva and lids normal  RESP:  no respiratory distress  NEURO:   No facial asymmetry, speech clear  PSYCH:  oriented X 3, affect and mood normal     Recent labs in [employer] reviewed by me today.     Assessment/Plan:  Acute left-sided low back pain with left-sided  sciatica  Spinal stenosis, unspecified spinal region  Physical deconditioning  Acute on chronic, ongoing. Continue tylenol 975 mg TID, flexeril 5 mg TID PRN, Cymbalta 60 mg daily, Neurontin 900 mg BID, oxycodone 5-10 mg every 6 hrs PRN, therapies as ordered and f/u with progress. Neurosurgery f/u as recommended.     Type 2 diabetes mellitus with both eyes affected by moderate nonproliferative retinopathy and macular edema, with long-term current use of insulin (H)  Chronic, not well managed. Stop eye drops since course completed. Continue Lantus 32 units daily, Lispro sliding scale QID, Humalog 75/25 50 units in AM and 60 units with dinner, monitor BG QID and review ranges next visit.     Class 3 severe obesity due to excess calories with serious comorbidity and body mass index (BMI) of 40.0 to 44.9 in adult (H)  Chronic. Monitor for safety. Encourage wt loss.     Hypertension, unspecified type  Chronic, continue Norvasc 10 mg daily, lisinopril 30 mg daily, monitor vs and review ranges next visit. BMP check PRN.    Dyslipidemia  Continue PTA Crestor 10 mg daily    Hypothyroidism, unspecified type  Continue synthroid 100 mcg daily    Urinary tract infection symptoms  Resolved. Monitor for any new symptoms.     Hx cataract surgery  Reports surgery over two months ago and asks to stop eye drops (course completed PTA to rehab).     MED REC REQUIRED  Post Medication Reconciliation Status:  Discharge medications reconciled and changed, see notes/orders    Orders:  1. Stop Zymaxid, Acular and Pred forte eye drops (ordered short term post eye surgery and completed course prior to admission).   2. Will ask ortho PA to visit patient to review spine imaging per her request.     Electronically signed by: HANY Tejeda CNP

## 2023-01-21 ENCOUNTER — TELEPHONE (OUTPATIENT)
Dept: GERIATRICS | Facility: CLINIC | Age: 78
End: 2023-01-21
Payer: MEDICARE

## 2023-01-22 NOTE — TELEPHONE ENCOUNTER
Having pain in left hip down to knee.  Requesting x-ray and so ordered pelvic and left femur x-ray to r/u fracture as not able to move around.      Electronically signed by Andreea Funez RN, CNP

## 2023-01-23 NOTE — PROGRESS NOTES
[place] [facility]  INITIAL VISIT NOTE  January 24, 2023    PRIMARY CARE PROVIDER AND CLINIC:  Myra Tesfaye [facility]ly [facility] [address] [facility]    CHIEF COMPLAINT:  Hospital follow-up/Initial visit    HPI:    Coretta Kolb is a 77 year old  (1945) female who was seen at [place] on [facility] on January 24, 2023 for an initial visit.     Medical history is notable for hypertension, dyslipidemia, DM type II, diabetic neuropathy, hypothyroidism, spinal stenosis, osteoarthritis, and morbid obesity.    Summary of hospital course:  Patient was hospitalized at [facility] from January 9 through January 14, 2023 for intractable back pain.  MRI of the lumbar spine revealed multilevel disc degeneration and severe spinal canal stenosis at L3-L4.  X-ray of left pelvis/femur showed no evidence of fracture or other acute findings.  She was evaluated by neurosurgery and steroid epidural injection was recommended.  It was achieved by IR on November 13, 2023.  She was also placed on scheduled acetaminophen and PRN oxycodone.  Notably, UA was abnormal and urine culture grew 10,000-50,000 colonies per mL Streptococcus agalactiae and she was treated with 3 days of IV ceftriaxone.  TCU was recommended per therapies.    Patient is admitted to this facility for medical management, nursing care, and rehab.     Of note, history was obtained from patient, facility RN, and extensive review of the chart.    Today's visit:  Patient was seen in her room, while lying in bed.  She appears weak but comfortable.  She is fully oriented.  Her left leg pain/sciatica has improved from 10 out of 10 to 6 out of 10.  She denies any back pain.  She has no trouble urinating or having a bowel movement.  She had a BM yesterday.  She denies fever, chills, chest pain, palpitation, dyspnea, nausea, vomiting, abdominal pain, or urinary symptoms.  Patient had low blood glucose of 60  yesterday.    CODE STATUS:   CPR/Full code     PAST MEDICAL HISTORY:   Hypertension  Dyslipidemia  DM type II  Nonproliferative diabetic retinopathy  Diabetic neuropathy  Hypothyroidism  UTI in January 2023  Pulmonary nodules  Plantar fascial fibromatosis  BCC  Osteoarthritis  DDD of lumbar spine, s/p L2-L3 hemilaminectomy and discectomy, L3-L5 laminectomy and foraminotomies, and L4-L5 spinal fusion in November 2010  Severe spinal stenosis at L3-L4, s/p CHANTAL on January 13, 2023  Morbid obesity, BMI 44.5    Note: No LUIS per patient    Past Medical History:   Diagnosis Date     Diabetes (H)      Hypertension      Hypothyroid        PAST SURGICAL HISTORY:   Past Surgical History:   Procedure Laterality Date     ANESTHESIA OUT OF OR MRI N/A 1/11/2023    Procedure: ANESTHESIA OUT OF OR LUMBAR MRI;  Surgeon: GENERIC ANESTHESIA PROVIDER;  Location:  OR     ORTHOPEDIC SURGERY         FAMILY HISTORY:   Family history is negative for breast cancer, ovarian cancer, DM type II, hypertension, and hypothyroidism.    SOCIAL HISTORY:  Social History     Tobacco Use     Smoking status: Never     Passive exposure: Never     Smokeless tobacco: Not on file   Substance Use Topics     Alcohol use: Yes     Comment: occ       MEDICATIONS:  Current Outpatient Medications   Medication Sig Dispense Refill     acetaminophen (TYLENOL) 325 MG tablet Take 3 tablets (975 mg) by mouth every 8 hours       amLODIPine (NORVASC) 10 MG tablet Take 10 mg by mouth daily       cyclobenzaprine (FLEXERIL) 5 MG tablet Take 1 tablet (5 mg) by mouth 3 times daily as needed for muscle spasms 15 tablet 0     DULoxetine (CYMBALTA) 60 MG capsule Take 60 mg by mouth daily       gabapentin (NEURONTIN) 300 MG capsule Take 900 mg by mouth 2 times daily       gatifloxacin (ZYMAXID) 0.5 % ophthalmic solution Place 1 drop into both eyes 4 times daily       insulin glargine (LANTUS VIAL) 100 UNIT/ML soln Inject 32 Units Subcutaneous every morning       insulin lispro  (HUMALOG KWIKPEN) 100 UNIT/ML (1 unit dial) KWIKPEN Inject insulin three times daily before meals and bedtime:  If Pre-meal Glucose  140 -164 give 1 unit  165 -189 give 2 units  190 -214 give 3 units  215 -239 give 4 units  240 -264 give 5 units  265 -289 give 6 units  290 -314 give 7 units  315 -339 give 8 units  340+ give 9 units    If Bedtime Glucose  200 -214 give 1 unit  215 -264 give 2 units  265 -314 give 3 units  315+ give 4 units 15 mL      insulin lispro protamine-insulin lispro (HUMALOG MIX 75/25 KWIKPEN) (75-25) 100 UNIT/ML pen Inject Subcutaneous 2 times daily (before meals) 50 units with breakfast in the morning and 60 units with dinner in the evening       ketorolac (ACULAR) 0.5 % ophthalmic solution Place 1 drop into both eyes 4 times daily       levothyroxine (SYNTHROID/LEVOTHROID) 100 MCG tablet Take 100 mcg by mouth daily       lisinopril (ZESTRIL) 30 MG tablet Take 30 mg by mouth daily       oxyCODONE (ROXICODONE) 5 MG tablet Take 1-2 tablets (5-10 mg) by mouth every 6 hours as needed for pain (IF pain not managed with non-pharmacological and non-opioid interventions) Give 5 mg for moderate pain (4-6), give 10 mg for moderate to severe pain (7-10) 16 tablet 0     prednisoLONE acetate (PRED FORTE) 1 % ophthalmic suspension Place 1 drop into both eyes 4 times daily       rosuvastatin (CRESTOR) 10 MG tablet Take 10 mg by mouth At Bedtime       terbinafine (LAMISIL) 250 MG tablet Take 250 mg by mouth Daily for one week each month       triamcinolone (KENALOG) 0.025 % external ointment Apply topically 2 times daily To lower legs         MED REC REQUIRED  Post Medication Reconciliation Status: medication reconcilation previously completed during another office visit       ALLERGIES:  Allergies   Allergen Reactions     Hydrocodone-Acetaminophen      constipated       ROS:  10 point ROS were negative other than the symptoms noted above in the HPI.    PHYSICAL EXAM:  Vital signs were reviewed in the  "chart.  Vital Signs: /75   Pulse 77   Temp 98.3  F (36.8  C)   Resp 18   Ht 1.549 m (5' 1\")   Wt 107.1 kg (236 lb 3.2 oz)   SpO2 96%   BMI 44.63 kg/m    General: Weak appearing but comfortable and in no acute distress  HEENT: No conjunctival pallor, no scleral icterus or injection, moist oral mucosa  Cardiovascular: Normal S1, S2, RRR  Respiratory: Lungs clear to auscultation bilaterally  GI: Abdomen soft, non-tender, non-distended, +BS  Extremities: 1-2+ LE lymphedema  Neuro: CX II-XII grossly intact; ROM in all four extremities grossly intact  Psych: Alert and oriented x3; normal affect  Skin: No acute rash    LABORATORY/IMAGING DATA:  All relevant labs and imaging data in [facility] and/or Care Everywhere were personally reviewed today.    BMP (January 18, 2023): Sodium 136, potassium 4.2, BUN 20.2, creatinine 0.64, glucose 119, calcium 9.6      Most Recent 3 CBC's:Recent Labs   Lab Test 01/10/23  0734 01/09/23  1104 11/30/22  0920   WBC 10.0 12.8* 8.3   HGB 12.7 14.3 15.2   MCV 89 89 89    290 222     Most Recent 3 BMP's:Recent Labs   Lab Test 01/14/23  1204 01/14/23  0801 01/14/23  0151 01/09/23  1824 01/09/23  1104 11/30/22  1121 11/30/22  0920   NA  --   --   --   --  134  --  135   POTASSIUM  --   --   --   --  4.1  --  4.6   CHLORIDE  --   --   --   --  103  --  103   CO2  --   --   --   --  23 --  23   BUN  --   --   --   --  22 --  18   CR  --   --   --   --  0.62  --  0.67   ANIONGAP  --   --   --   --  8  --  9   KATERIN  --   --   --   --  9.6  --  9.8   * 153* 147*   < > 273*   < > 432*    < > = values in this interval not displayed.     Most Recent 2 LFT's:Recent Labs   Lab Test 11/30/22  0920   AST 35   ALT 27   ALKPHOS 114   BILITOTAL 0.5         ASSESSMENT/PLAN:  Left-sided back pain with sciatica,  Severe spinal stenosis at L3-L4, s/p CHANTAL on January 13, 2023,  DDD of lumbar spine with history of L2-L3 hemilaminectomy and discectomy, L3-L5 laminectomy and foraminotomies, and " L4-L5 spinal fusion in November 2010,  Pain is improving.  Plan:  Continue acetaminophen 975 mg every 8 hours, oxycodone 5 mg every 6 hours PRN, and cyclobenzaprine 5 mg 3 times daily as needed for muscle spasms  Continue PT/OT evaluation and therapy  Monitor symptoms    UTI.  Urine culture grew 10,000-50,000 colonies per mL Streptococcus agalactiae.  Patient completed 3 days of IV ceftriaxone inpatient.  Patient is currently asymptomatic.  Plan:  Monitor symptoms    Essential hypertension.  Blood pressure is fairly controlled.  Plan:  Continue amlodipine 10 mg daily  Continue lisinopril 30 mg daily  Monitor blood pressure    Dyslipidemia.  Plan:  Continue rosuvastatin 10 mg daily    DM type II,  Nonproliferative diabetic retinopathy,  Diabetic neuropathy.  Last hemoglobin A1c was 9.8% on November 30, 2022.  PTA empagliflozin was discontinued in the hospital.  Low blood glucose of 60 yesterday.  This a.m. blood glucose is 95.  Plan:  Continue diabetic diet  Continue insulin glargine 32 units subcu every morning and hold parameters  Continue NovoLog Mix 75/25, 50 units subcu in the morning and 60 units subcu with dinner with hold parameters  Continue Humalog sliding scale  Continue gabapentin 900 mg twice daily  Continue duloxetine 60 mg daily  Continue to monitor blood glucose before meals and at bedtime and adjust diabetic regimen as indicated    Hypothyroidism.  Last TSH was 3.34 in March 2022.  Plan:  Continue levothyroxine 100 mcg daily    Morbid obesity, BMI 44.5.  Plan:  Encourage weight loss  Staff to assist with daily care and mobility        Orders written by provider at facility:  Hold insulin glargine and NovoLog mix if blood glucose less than 120    Recommendation by provider at facility:  Adjust insulin regimen if hypoglycemia recurs      Disclaimer: This note may contain text created using speech-recognition software and may contain unintended word substitutions.      Electronically signed  by:  Dorian Lynn MD

## 2023-01-24 ENCOUNTER — TRANSITIONAL CARE UNIT VISIT (OUTPATIENT)
Dept: GERIATRICS | Facility: CLINIC | Age: 78
End: 2023-01-24
Payer: MEDICARE

## 2023-01-24 VITALS
TEMPERATURE: 98.3 F | SYSTOLIC BLOOD PRESSURE: 131 MMHG | HEART RATE: 77 BPM | BODY MASS INDEX: 44.6 KG/M2 | HEIGHT: 61 IN | RESPIRATION RATE: 18 BRPM | WEIGHT: 236.2 LBS | OXYGEN SATURATION: 96 % | DIASTOLIC BLOOD PRESSURE: 75 MMHG

## 2023-01-24 DIAGNOSIS — M48.061 SPINAL STENOSIS OF LUMBAR REGION, UNSPECIFIED WHETHER NEUROGENIC CLAUDICATION PRESENT: ICD-10-CM

## 2023-01-24 DIAGNOSIS — R53.81 PHYSICAL DECONDITIONING: ICD-10-CM

## 2023-01-24 DIAGNOSIS — I10 ESSENTIAL HYPERTENSION: ICD-10-CM

## 2023-01-24 DIAGNOSIS — Z79.4 TYPE 2 DIABETES MELLITUS WITH OTHER SPECIFIED COMPLICATION, WITH LONG-TERM CURRENT USE OF INSULIN (H): ICD-10-CM

## 2023-01-24 DIAGNOSIS — M54.42 ACUTE LEFT-SIDED LOW BACK PAIN WITH LEFT-SIDED SCIATICA: Primary | ICD-10-CM

## 2023-01-24 DIAGNOSIS — N39.0 URINARY TRACT INFECTION WITHOUT HEMATURIA, SITE UNSPECIFIED: ICD-10-CM

## 2023-01-24 DIAGNOSIS — E03.9 HYPOTHYROIDISM, UNSPECIFIED TYPE: ICD-10-CM

## 2023-01-24 DIAGNOSIS — E66.01 MORBID OBESITY (H): ICD-10-CM

## 2023-01-24 DIAGNOSIS — E11.69 TYPE 2 DIABETES MELLITUS WITH OTHER SPECIFIED COMPLICATION, WITH LONG-TERM CURRENT USE OF INSULIN (H): ICD-10-CM

## 2023-01-24 DIAGNOSIS — E78.5 DYSLIPIDEMIA: ICD-10-CM

## 2023-01-24 PROCEDURE — 99306 1ST NF CARE HIGH MDM 50: CPT | Performed by: INTERNAL MEDICINE

## 2023-01-24 NOTE — LETTER
1/24/2023        RE: Coretta Kolb  [address]  [place] [address]        [place] [facility]  INITIAL VISIT NOTE  January 24, 2023    PRIMARY CARE PROVIDER AND CLINIC:  Myra Tesfaye [facility] [address] [facility]    CHIEF COMPLAINT:  Hospital follow-up/Initial visit    HPI:    Coretta Kolb is a 77 year old  (1945) female who was seen at [place] on [facility] on January 24, 2023 for an initial visit.     Medical history is notable for hypertension, dyslipidemia, DM type II, diabetic neuropathy, hypothyroidism, spinal stenosis, osteoarthritis, and morbid obesity.    Summary of hospital course:  Patient was hospitalized at [facility] from January 9 through January 14, 2023 for intractable back pain.  MRI of the lumbar spine revealed multilevel disc degeneration and severe spinal canal stenosis at L3-L4.  X-ray of left pelvis/femur showed no evidence of fracture or other acute findings.  She was evaluated by neurosurgery and steroid epidural injection was recommended.  It was achieved by IR on November 13, 2023.  She was also placed on scheduled acetaminophen and PRN oxycodone.  Notably, UA was abnormal and urine culture grew 10,000-50,000 colonies per mL Streptococcus agalactiae and she was treated with 3 days of IV ceftriaxone.  TCU was recommended per therapies.    Patient is admitted to this facility for medical management, nursing care, and rehab.     Of note, history was obtained from patient, facility RN, and extensive review of the chart.    Today's visit:  Patient was seen in her room, while lying in bed.  She appears weak but comfortable.  She is fully oriented.  Her left leg pain/sciatica has improved from 10 out of 10 to 6 out of 10.  She denies any back pain.  She has no trouble urinating or having a bowel movement.  She had a BM yesterday.  She denies fever, chills, chest pain, palpitation, dyspnea,  nausea, vomiting, abdominal pain, or urinary symptoms.  Patient had low blood glucose of 60 yesterday.    CODE STATUS:   CPR/Full code     PAST MEDICAL HISTORY:   Hypertension  Dyslipidemia  DM type II  Nonproliferative diabetic retinopathy  Diabetic neuropathy  Hypothyroidism  UTI in January 2023  Pulmonary nodules  Plantar fascial fibromatosis  BCC  Osteoarthritis  DDD of lumbar spine, s/p L2-L3 hemilaminectomy and discectomy, L3-L5 laminectomy and foraminotomies, and L4-L5 spinal fusion in November 2010  Severe spinal stenosis at L3-L4, s/p CHANTAL on January 13, 2023  Morbid obesity, BMI 44.5    Note: No LUIS per patient    Past Medical History:   Diagnosis Date     Diabetes (H)      Hypertension      Hypothyroid        PAST SURGICAL HISTORY:   Past Surgical History:   Procedure Laterality Date     ANESTHESIA OUT OF OR MRI N/A 1/11/2023    Procedure: ANESTHESIA OUT OF OR LUMBAR MRI;  Surgeon: GENERIC ANESTHESIA PROVIDER;  Location:  OR     ORTHOPEDIC SURGERY         FAMILY HISTORY:   Family history is negative for breast cancer, ovarian cancer, DM type II, hypertension, and hypothyroidism.    SOCIAL HISTORY:  Social History     Tobacco Use     Smoking status: Never     Passive exposure: Never     Smokeless tobacco: Not on file   Substance Use Topics     Alcohol use: Yes     Comment: occ       MEDICATIONS:  Current Outpatient Medications   Medication Sig Dispense Refill     acetaminophen (TYLENOL) 325 MG tablet Take 3 tablets (975 mg) by mouth every 8 hours       amLODIPine (NORVASC) 10 MG tablet Take 10 mg by mouth daily       cyclobenzaprine (FLEXERIL) 5 MG tablet Take 1 tablet (5 mg) by mouth 3 times daily as needed for muscle spasms 15 tablet 0     DULoxetine (CYMBALTA) 60 MG capsule Take 60 mg by mouth daily       gabapentin (NEURONTIN) 300 MG capsule Take 900 mg by mouth 2 times daily       gatifloxacin (ZYMAXID) 0.5 % ophthalmic solution Place 1 drop into both eyes 4 times daily       insulin glargine (LANTUS  VIAL) 100 UNIT/ML soln Inject 32 Units Subcutaneous every morning       insulin lispro (HUMALOG KWIKPEN) 100 UNIT/ML (1 unit dial) KWIKPEN Inject insulin three times daily before meals and bedtime:  If Pre-meal Glucose  140 -164 give 1 unit  165 -189 give 2 units  190 -214 give 3 units  215 -239 give 4 units  240 -264 give 5 units  265 -289 give 6 units  290 -314 give 7 units  315 -339 give 8 units  340+ give 9 units    If Bedtime Glucose  200 -214 give 1 unit  215 -264 give 2 units  265 -314 give 3 units  315+ give 4 units 15 mL      insulin lispro protamine-insulin lispro (HUMALOG MIX 75/25 KWIKPEN) (75-25) 100 UNIT/ML pen Inject Subcutaneous 2 times daily (before meals) 50 units with breakfast in the morning and 60 units with dinner in the evening       ketorolac (ACULAR) 0.5 % ophthalmic solution Place 1 drop into both eyes 4 times daily       levothyroxine (SYNTHROID/LEVOTHROID) 100 MCG tablet Take 100 mcg by mouth daily       lisinopril (ZESTRIL) 30 MG tablet Take 30 mg by mouth daily       oxyCODONE (ROXICODONE) 5 MG tablet Take 1-2 tablets (5-10 mg) by mouth every 6 hours as needed for pain (IF pain not managed with non-pharmacological and non-opioid interventions) Give 5 mg for moderate pain (4-6), give 10 mg for moderate to severe pain (7-10) 16 tablet 0     prednisoLONE acetate (PRED FORTE) 1 % ophthalmic suspension Place 1 drop into both eyes 4 times daily       rosuvastatin (CRESTOR) 10 MG tablet Take 10 mg by mouth At Bedtime       terbinafine (LAMISIL) 250 MG tablet Take 250 mg by mouth Daily for one week each month       triamcinolone (KENALOG) 0.025 % external ointment Apply topically 2 times daily To lower legs         MED REC REQUIRED  Post Medication Reconciliation Status: medication reconcilation previously completed during another office visit       ALLERGIES:  Allergies   Allergen Reactions     Hydrocodone-Acetaminophen      constipated       ROS:  10 point ROS were negative other than the  "symptoms noted above in the HPI.    PHYSICAL EXAM:  Vital signs were reviewed in the chart.  Vital Signs: /75   Pulse 77   Temp 98.3  F (36.8  C)   Resp 18   Ht 1.549 m (5' 1\")   Wt 107.1 kg (236 lb 3.2 oz)   SpO2 96%   BMI 44.63 kg/m    General: Weak appearing but comfortable and in no acute distress  HEENT: No conjunctival pallor, no scleral icterus or injection, moist oral mucosa  Cardiovascular: Normal S1, S2, RRR  Respiratory: Lungs clear to auscultation bilaterally  GI: Abdomen soft, non-tender, non-distended, +BS  Extremities: 1-2+ LE lymphedema  Neuro: CX II-XII grossly intact; ROM in all four extremities grossly intact  Psych: Alert and oriented x3; normal affect  Skin: No acute rash    LABORATORY/IMAGING DATA:  All relevant labs and imaging data in [facility] and/or Care Everywhere were personally reviewed today.    BMP (January 18, 2023): Sodium 136, potassium 4.2, BUN 20.2, creatinine 0.64, glucose 119, calcium 9.6      Most Recent 3 CBC's:Recent Labs   Lab Test 01/10/23  0734 01/09/23  1104 11/30/22  0920   WBC 10.0 12.8* 8.3   HGB 12.7 14.3 15.2   MCV 89 89 89    290 222     Most Recent 3 BMP's:Recent Labs   Lab Test 01/14/23  1204 01/14/23  0801 01/14/23  0151 01/09/23  1824 01/09/23  1104 11/30/22  1121 11/30/22  0920   NA  --   --   --   --  134  --  135   POTASSIUM  --   --   --   --  4.1  --  4.6   CHLORIDE  --   --   --   --  103  --  103   CO2  --   --   --   --  23 --  23   BUN  --   --   --   --  22 -- 18   CR  --   --   --   --  0.62  --  0.67   ANIONGAP  --   --   --   --  8  --  9   KATERIN  --   --   --   --  9.6  --  9.8   * 153* 147*   < > 273*   < > 432*    < > = values in this interval not displayed.     Most Recent 2 LFT's:Recent Labs   Lab Test 11/30/22  0920   AST 35   ALT 27   ALKPHOS 114   BILITOTAL 0.5         ASSESSMENT/PLAN:  Left-sided back pain with sciatica,  Severe spinal stenosis at L3-L4, s/p CHANTAL on January 13, 2023,  DDD of lumbar spine with history " of L2-L3 hemilaminectomy and discectomy, L3-L5 laminectomy and foraminotomies, and L4-L5 spinal fusion in November 2010,  Pain is improving.  Plan:  Continue acetaminophen 975 mg every 8 hours, oxycodone 5 mg every 6 hours PRN, and cyclobenzaprine 5 mg 3 times daily as needed for muscle spasms  Continue PT/OT evaluation and therapy  Monitor symptoms    UTI.  Urine culture grew 10,000-50,000 colonies per mL Streptococcus agalactiae.  Patient completed 3 days of IV ceftriaxone inpatient.  Patient is currently asymptomatic.  Plan:  Monitor symptoms    Essential hypertension.  Blood pressure is fairly controlled.  Plan:  Continue amlodipine 10 mg daily  Continue lisinopril 30 mg daily  Monitor blood pressure    Dyslipidemia.  Plan:  Continue rosuvastatin 10 mg daily    DM type II,  Nonproliferative diabetic retinopathy,  Diabetic neuropathy.  Last hemoglobin A1c was 9.8% on November 30, 2022.  PTA empagliflozin was discontinued in the hospital.  Low blood glucose of 60 yesterday.  This a.m. blood glucose is 95.  Plan:  Continue diabetic diet  Continue insulin glargine 32 units subcu every morning and hold parameters  Continue NovoLog Mix 75/25, 50 units subcu in the morning and 60 units subcu with dinner with hold parameters  Continue Humalog sliding scale  Continue gabapentin 900 mg twice daily  Continue duloxetine 60 mg daily  Continue to monitor blood glucose before meals and at bedtime and adjust diabetic regimen as indicated    Hypothyroidism.  Last TSH was 3.34 in March 2022.  Plan:  Continue levothyroxine 100 mcg daily    Morbid obesity, BMI 44.5.  Plan:  Encourage weight loss  Staff to assist with daily care and mobility        Orders written by provider at facility:  Hold insulin glargine and NovoLog mix if blood glucose less than 120    Recommendation by provider at facility:  Adjust insulin regimen if hypoglycemia recurs      Disclaimer: This note may contain text created using speech-recognition software and  may contain unintended word substitutions.      Electronically signed by:  Dorian Lynn MD                          Sincerely,        Dorian Lynn MD

## 2023-01-31 ENCOUNTER — TRANSITIONAL CARE UNIT VISIT (OUTPATIENT)
Dept: GERIATRICS | Facility: CLINIC | Age: 78
End: 2023-01-31
Payer: MEDICARE

## 2023-01-31 VITALS
SYSTOLIC BLOOD PRESSURE: 138 MMHG | RESPIRATION RATE: 18 BRPM | DIASTOLIC BLOOD PRESSURE: 73 MMHG | HEART RATE: 80 BPM | WEIGHT: 236.3 LBS | TEMPERATURE: 97.9 F | BODY MASS INDEX: 44.65 KG/M2 | OXYGEN SATURATION: 94 %

## 2023-01-31 DIAGNOSIS — E03.9 HYPOTHYROIDISM, UNSPECIFIED TYPE: ICD-10-CM

## 2023-01-31 DIAGNOSIS — Z79.4 TYPE 2 DIABETES MELLITUS WITH OTHER SPECIFIED COMPLICATION, WITH LONG-TERM CURRENT USE OF INSULIN (H): ICD-10-CM

## 2023-01-31 DIAGNOSIS — M54.42 ACUTE LEFT-SIDED LOW BACK PAIN WITH LEFT-SIDED SCIATICA: Primary | ICD-10-CM

## 2023-01-31 DIAGNOSIS — E66.01 MORBID OBESITY (H): ICD-10-CM

## 2023-01-31 DIAGNOSIS — R53.81 PHYSICAL DECONDITIONING: ICD-10-CM

## 2023-01-31 DIAGNOSIS — E11.69 TYPE 2 DIABETES MELLITUS WITH OTHER SPECIFIED COMPLICATION, WITH LONG-TERM CURRENT USE OF INSULIN (H): ICD-10-CM

## 2023-01-31 DIAGNOSIS — E78.5 DYSLIPIDEMIA: ICD-10-CM

## 2023-01-31 DIAGNOSIS — R60.9 EDEMA, UNSPECIFIED TYPE: ICD-10-CM

## 2023-01-31 DIAGNOSIS — I10 ESSENTIAL HYPERTENSION: ICD-10-CM

## 2023-01-31 DIAGNOSIS — M48.061 SPINAL STENOSIS OF LUMBAR REGION, UNSPECIFIED WHETHER NEUROGENIC CLAUDICATION PRESENT: ICD-10-CM

## 2023-01-31 PROCEDURE — 99309 SBSQ NF CARE MODERATE MDM 30: CPT | Performed by: NURSE PRACTITIONER

## 2023-01-31 NOTE — PROGRESS NOTES
[facility]    Chief Complaint   Patient presents with     RECHECK     HPI:  Coretta Kolb is a 77 year old  (1945), who is being seen today for an episodic care visit at: [facility] (TCU) [[id number]].     Per recent TCU provider progress notes:   77 year old female PMH DM2 (on insulin Lantus and Novolog), HTN on atenolol and lisinopril, hypothyroid on synthroid, ?cognitive impairment hospitalized with left lower back and left lateral thigh pain due to suspected L3-4 radiculopathy and severe spinal canal stenosis. Has history L2-L3 hemilaminectomy and discectomy, L3-5 laminectomy and foraminotomies bilaterally and L4-5 spinal fusion in November 2010. NSGY: epidural steroid injection completed on 1/13, symptoms somewhat improved. On tylenol, flexeril and oxycodone. ?UTI completed three days of rocephin. To TCU for rehab.     Today's concern is: seen for f/u mobility, pain, VS. Reports pain not improved. Discussed increasing Neurontin dose, f/u with  Neurosurgery if not effective. No headaches, dizziness, chest pain, dyspnea, bowel or bladder issues. Increased LE edema. BP range 127-149/73-76 and sats 94% room air. BG range 123-211 daily. SLUMS 27/30 and walks 20 ft in room.     Allergies, and PMH/PSH reviewed in [facility] today.  REVIEW OF SYSTEMS:  4 point ROS including Respiratory, CV, GI and , other than that noted in the HPI,  is negative    Objective:   /73   Pulse 80   Temp 97.9  F (36.6  C)   Resp 18   Wt 107.2 kg (236 lb 4.8 oz)   SpO2 94%   BMI 44.65 kg/m     GENERAL APPEARANCE:  Alert, in no distress, cooperative  ENT:  Mouth normal, moist mucous membranes, normal hearing acuity  EYES:  Conjunctiva and lids normal  RESP:  no respiratory distress, on room air  CV: LE edema +1  NEURO:   No facial asymmetry, speech clear  PSYCH:  oriented X 3, affect and mood normal     Recent labs in [facility] reviewed by me today.   Estimated Creatinine Clearance: 85.8 mL/min (based on SCr  of 0.64 mg/dL).   Lab Results   Component Value Date    CR 0.64 01/18/2023    CR 0.61 10/27/2014     Lab Results   Component Value Date    HGB 12.7 01/10/2023    HGB 14.4 10/27/2014       Assessment/Plan:  Acute left-sided low back pain with left-sided sciatica  Spinal stenosis, unspecified spinal region  Physical deconditioning  Acute on chronic, ongoing. Continue tylenol 975 mg TID, flexeril 5 mg TID PRN, Cymbalta 60 mg daily, Neurontin increase to 900 mg BID and 600 mg at 1400 daily, oxycodone 5-10 mg every 6 hrs PRN, therapies as ordered and f/u with progress. Neurosurgery f/u as recommended if no improvement in pain.    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 Continue Lantus decrease to 26 units daily, Lispro sliding scale QID, Humalog 75/25 50 units in AM and 60 units with dinner, monitor BG QID and review ranges next visit.     Class 3 severe obesity due to excess calories with serious comorbidity and body mass index (BMI) of 40.0 to 44.9 in adult (H)  Chronic. Monitor for safety. Encourage wt loss.     Hypertension, unspecified type  Edema  Chronic, continue Norvasc 10 mg daily, lisinopril 30 mg daily, monitor vs and review ranges next visit. BMP check PRN. OT eval for compression stockings due to edema.     Dyslipidemia  Continue PTA Crestor 10 mg daily    Hypothyroidism, unspecified type  Continue synthroid 100 mcg daily    MED REC REQUIRED  Post Medication Reconciliation Status:  Discharge medications reconciled and changed, see notes/orders    Orders:  1. Increase gabapentin to 900 mg PO BID and 600 mg at 1400 daily diagnosis pain  2. OT measure for compression stockings to legs diagnosis edema  2. F/U with [facility] PRN no improvement in pain    Electronically signed by: HANY Tejeda CNP

## 2023-02-02 VITALS
TEMPERATURE: 97.8 F | RESPIRATION RATE: 18 BRPM | OXYGEN SATURATION: 97 % | SYSTOLIC BLOOD PRESSURE: 137 MMHG | BODY MASS INDEX: 45.16 KG/M2 | WEIGHT: 239 LBS | HEART RATE: 76 BPM | DIASTOLIC BLOOD PRESSURE: 62 MMHG

## 2023-02-03 ENCOUNTER — TRANSITIONAL CARE UNIT VISIT (OUTPATIENT)
Dept: GERIATRICS | Facility: CLINIC | Age: 78
End: 2023-02-03
Payer: MEDICARE

## 2023-02-03 DIAGNOSIS — E11.69 TYPE 2 DIABETES MELLITUS WITH OTHER SPECIFIED COMPLICATION, WITH LONG-TERM CURRENT USE OF INSULIN (H): ICD-10-CM

## 2023-02-03 DIAGNOSIS — E78.5 DYSLIPIDEMIA: ICD-10-CM

## 2023-02-03 DIAGNOSIS — E03.9 HYPOTHYROIDISM, UNSPECIFIED TYPE: ICD-10-CM

## 2023-02-03 DIAGNOSIS — R53.81 PHYSICAL DECONDITIONING: ICD-10-CM

## 2023-02-03 DIAGNOSIS — M54.42 ACUTE LEFT-SIDED LOW BACK PAIN WITH LEFT-SIDED SCIATICA: Primary | ICD-10-CM

## 2023-02-03 DIAGNOSIS — Z79.4 TYPE 2 DIABETES MELLITUS WITH OTHER SPECIFIED COMPLICATION, WITH LONG-TERM CURRENT USE OF INSULIN (H): ICD-10-CM

## 2023-02-03 DIAGNOSIS — I10 ESSENTIAL HYPERTENSION: ICD-10-CM

## 2023-02-03 DIAGNOSIS — E66.01 MORBID OBESITY (H): ICD-10-CM

## 2023-02-03 DIAGNOSIS — R60.9 EDEMA, UNSPECIFIED TYPE: ICD-10-CM

## 2023-02-03 DIAGNOSIS — M48.061 SPINAL STENOSIS OF LUMBAR REGION, UNSPECIFIED WHETHER NEUROGENIC CLAUDICATION PRESENT: ICD-10-CM

## 2023-02-03 PROCEDURE — 99309 SBSQ NF CARE MODERATE MDM 30: CPT | Performed by: NURSE PRACTITIONER

## 2023-02-03 NOTE — PROGRESS NOTES
[facility] GERIATRICS    Chief Complaint   Patient presents with     RECHECK     HPI:  Coretta Kolb is a 77 year old  (1945), who is being seen today for an episodic care visit at: [facility] (TCU) [[id number]].     Per recent TCU provider progress notes:   77 year old female PMH DM2 (on insulin Lantus and Novolog), HTN on atenolol and lisinopril, hypothyroid on synthroid, ?cognitive impairment hospitalized with left lower back and left lateral thigh pain due to suspected L3-4 radiculopathy and severe spinal canal stenosis. Has history L2-L3 hemilaminectomy and discectomy, L3-5 laminectomy and foraminotomies bilaterally and L4-5 spinal fusion in November 2010. NSGY: epidural steroid injection completed on 1/13, symptoms somewhat improved. On tylenol, flexeril and oxycodone. ?UTI completed three days of rocephin. To TCU for rehab.     Today's concern is: seen for f/u mobility, pain, VS. Reports pain not improved despite increased Neurontin dose, asks to switch back to BID dosing. Plans on seeing NSGY for f/u early next week. No headaches, dizziness, chest pain, dyspnea, bowel or bladder issues. LE edema +1 managed with compression. BP range 127-145/62-77 and sats 96% room air. BG range  daily. SLUMS 27/30 and walks 20 ft x 2 with 2WW.     Allergies, and PMH/PSH reviewed in [facility] today.  REVIEW OF SYSTEMS:  4 point ROS including Respiratory, CV, GI and , other than that noted in the HPI,  is negative    Objective:   /62   Pulse 76   Temp 97.8  F (36.6  C)   Resp 18   Wt 108.4 kg (239 lb)   SpO2 97%   BMI 45.16 kg/m     GENERAL APPEARANCE:  Alert, in no distress, cooperative  ENT:  Mouth normal, moist mucous membranes, normal hearing acuity  EYES:  Conjunctiva and lids normal  RESP:  no respiratory distress, on room air, [facility]  CV: LE edema +1 compression socks in place  NEURO:   No facial asymmetry, speech clear  PSYCH:  oriented X 3, affect and mood normal     Recent labs in [facility]  reviewed by me today.     Lab Results   Component Value Date    CR 0.64 01/18/2023    CR 0.61 10/27/2014     Lab Results   Component Value Date    HGB 12.7 01/10/2023    HGB 14.4 10/27/2014       Assessment/Plan:  Acute left-sided low back pain with left-sided sciatica  Spinal stenosis, unspecified spinal region  Physical deconditioning  Acute on chronic, ongoing. Continue tylenol 975 mg TID, flexeril 5 mg TID PRN, Cymbalta 60 mg daily, Neurontin back to 900 mg BID, oxycodone 5-10 mg every 6 hrs PRN, therapies as ordered and f/u with progress. Neurosurgery f/u as planned next week.    Type 2 diabetes mellitus with both eyes affected by moderate nonproliferative retinopathy and macular edema, with long-term current use of insulin (H)  Chronic, BG improved. Continue Lantus decreased to 26 units daily, Lispro sliding scale QID, Humalog 75/25 50 units in AM and 60 units with dinner, monitor BG QID and review ranges next visit.     Class 3 severe obesity due to excess calories with serious comorbidity and body mass index (BMI) of 40.0 to 44.9 in adult (H)  Chronic. Monitor for safety. Encourage wt loss.     Hypertension, unspecified type  Edema  Chronic, continue Norvasc 10 mg daily, lisinopril 30 mg daily, monitor vs and review ranges next visit. BMP check PRN. Compression socks to legs due to edema daily.     Dyslipidemia  Continue PTA Crestor 10 mg daily    Hypothyroidism, unspecified type  Continue synthroid 100 mcg daily    MED REC REQUIRED  Post Medication Reconciliation Status:  Discharge medications reconciled and changed, see notes/orders    Orders:  Change gabapentin back to 900 mg PO BID diagnosis pain    Electronically signed by: HANY Tejeda CNP

## 2023-02-06 ENCOUNTER — TRANSFERRED RECORDS (OUTPATIENT)
Dept: HEALTH INFORMATION MANAGEMENT | Facility: CLINIC | Age: 78
End: 2023-02-06

## 2023-02-08 ENCOUNTER — DISCHARGE SUMMARY NURSING HOME (OUTPATIENT)
Dept: GERIATRICS | Facility: CLINIC | Age: 78
End: 2023-02-08
Payer: MEDICARE

## 2023-02-08 ENCOUNTER — TELEPHONE (OUTPATIENT)
Dept: MEDSURG UNIT | Facility: CLINIC | Age: 78
End: 2023-02-08

## 2023-02-08 VITALS
BODY MASS INDEX: 45.16 KG/M2 | HEART RATE: 79 BPM | OXYGEN SATURATION: 99 % | WEIGHT: 239 LBS | TEMPERATURE: 98.7 F | SYSTOLIC BLOOD PRESSURE: 122 MMHG | DIASTOLIC BLOOD PRESSURE: 67 MMHG | RESPIRATION RATE: 18 BRPM

## 2023-02-08 DIAGNOSIS — M54.42 ACUTE LEFT-SIDED LOW BACK PAIN WITH LEFT-SIDED SCIATICA: Primary | ICD-10-CM

## 2023-02-08 DIAGNOSIS — I10 ESSENTIAL HYPERTENSION: ICD-10-CM

## 2023-02-08 DIAGNOSIS — M48.061 SPINAL STENOSIS OF LUMBAR REGION, UNSPECIFIED WHETHER NEUROGENIC CLAUDICATION PRESENT: ICD-10-CM

## 2023-02-08 DIAGNOSIS — E66.01 MORBID OBESITY (H): ICD-10-CM

## 2023-02-08 DIAGNOSIS — Z79.4 TYPE 2 DIABETES MELLITUS WITH OTHER SPECIFIED COMPLICATION, WITH LONG-TERM CURRENT USE OF INSULIN (H): ICD-10-CM

## 2023-02-08 DIAGNOSIS — E78.5 DYSLIPIDEMIA: ICD-10-CM

## 2023-02-08 DIAGNOSIS — E11.69 TYPE 2 DIABETES MELLITUS WITH OTHER SPECIFIED COMPLICATION, WITH LONG-TERM CURRENT USE OF INSULIN (H): ICD-10-CM

## 2023-02-08 DIAGNOSIS — R60.9 EDEMA, UNSPECIFIED TYPE: ICD-10-CM

## 2023-02-08 DIAGNOSIS — E03.9 HYPOTHYROIDISM, UNSPECIFIED TYPE: ICD-10-CM

## 2023-02-08 DIAGNOSIS — R53.81 PHYSICAL DECONDITIONING: ICD-10-CM

## 2023-02-08 PROCEDURE — 99316 NF DSCHRG MGMT 30 MIN+: CPT | Performed by: NURSE PRACTITIONER

## 2023-02-08 NOTE — PROGRESS NOTES
[facility] GERIATRICS DISCHARGE SUMMARY  PATIENT'S NAME: Coretta Kolb  YOB: 1945  MEDICAL RECORD NUMBER:  [id number]  Place of Service where encounter took place:  [facility] (TCU) [[id number]]    PRIMARY CARE PROVIDER AND CLINIC RESPONSIBLE AFTER TRANSFER:   Myra Tesfaye DO, [facility] [address] [facility]   Non-[facility] Provider     Transferring providers: HANY Tejeda CNP, Dr. Leah MD  Recent Hospitalization/ED:  [facility] stay 1/9/23 to 1/14/23.  Date of SNF Admission: 1/14/23  Date of SNF (anticipated) Discharge: 2/10/23  Discharged to: new assisted living for patient TBD  Cognitive Scores: SLUMS: 27/30  Physical Function: Ambulating 100 ft with SBA  DME: No new DME needed    CODE STATUS/ADVANCE DIRECTIVES DISCUSSION:  Prior   ALLERGIES: Hydrocodone-acetaminophen    NURSING FACILITY COURSE   Medication Changes/Rationale:     Stopped flexeril, oxycodone - not used    Per recent TCU provider progress notes:   77 year old female PMH DM2 (on insulin Lantus and Novolog), HTN on atenolol and lisinopril, hypothyroid on synthroid, ?cognitive impairment hospitalized with left lower back and left lateral thigh pain due to suspected L3-4 radiculopathy and severe spinal canal stenosis. Has history L2-L3 hemilaminectomy and discectomy, L3-5 laminectomy and foraminotomies bilaterally and L4-5 spinal fusion in November 2010. NSGY: epidural steroid injection completed on 1/13, symptoms somewhat improved. On tylenol, flexeril and oxycodone. ?UTI completed three days of rocephin. To TCU for rehab.     Seen for discharge visit. No new concerns. Pain ongoing, following up with NSGY as recommended. SBP range 122-146/67-76 and sats 99% room air. BG range 115-253 daily.     Summary of nursing facility stay:   Acute left-sided low back pain with left-sided sciatica  Spinal stenosis, unspecified spinal region  Physical  deconditioning  Acute on chronic, ongoing. Continue tylenol 975 mg TID, Cymbalta 60 mg daily, Neurontin back to 900 mg BID, stop PRN oxycodone and flexeril (not used). To long term vs respite care with home care as recommended. Neurosurgery f/u as planned.     Type 2 diabetes mellitus with both eyes affected by moderate nonproliferative retinopathy and macular edema, with long-term current use of insulin (H)  Chronic, BG improved. Continue Lantus decreased to 26 units daily, Lispro sliding scale QID, Humalog 75/25 50 units in AM and 60 units with dinner, monitor BG QID and review ranges next PCP visit.     Class 3 severe obesity due to excess calories with serious comorbidity and body mass index (BMI) of 40.0 to 44.9 in adult (H)  Chronic. Monitor for safety. Encourage wt loss.     Hypertension, unspecified type  Edema  Chronic, continue Norvasc 10 mg daily, lisinopril 30 mg daily, monitor vs and review ranges next visit. BMP check PRN. Compression socks to legs due to edema daily.     Dyslipidemia  Continue PTA Crestor 10 mg daily    Hypothyroidism, unspecified type  Continue synthroid 100 mcg daily    Discharge Medications:  MED REC REQUIRED  Post Medication Reconciliation Status:  Discharge medications reconciled and changed, see notes/orders      Current Outpatient Medications   Medication Sig Dispense Refill     acetaminophen (TYLENOL) 325 MG tablet Take 3 tablets (975 mg) by mouth every 8 hours (Patient taking differently: Take 975 mg by mouth every 8 hours as needed)       amLODIPine (NORVASC) 10 MG tablet Take 10 mg by mouth daily       DULoxetine (CYMBALTA) 60 MG capsule Take 60 mg by mouth daily       gabapentin (NEURONTIN) 300 MG capsule Take 900 mg by mouth 2 times daily       insulin glargine (LANTUS VIAL) 100 UNIT/ML soln Inject 32 Units Subcutaneous every morning       insulin lispro (HUMALOG KWIKPEN) 100 UNIT/ML (1 unit dial) KWIKPEN Inject insulin three times daily before meals and bedtime:  If Pre-meal  Glucose  140 -164 give 1 unit  165 -189 give 2 units  190 -214 give 3 units  215 -239 give 4 units  240 -264 give 5 units  265 -289 give 6 units  290 -314 give 7 units  315 -339 give 8 units  340+ give 9 units    If Bedtime Glucose  200 -214 give 1 unit  215 -264 give 2 units  265 -314 give 3 units  315+ give 4 units 15 mL      insulin lispro protamine-insulin lispro (HUMALOG MIX 75/25 KWIKPEN) (75-25) 100 UNIT/ML pen Inject Subcutaneous 2 times daily (before meals) 50 units with breakfast in the morning and 60 units with dinner in the evening       levothyroxine (SYNTHROID/LEVOTHROID) 100 MCG tablet Take 100 mcg by mouth daily       lisinopril (ZESTRIL) 30 MG tablet Take 30 mg by mouth daily       rosuvastatin (CRESTOR) 10 MG tablet Take 10 mg by mouth At Bedtime       terbinafine (LAMISIL) 250 MG tablet Take 250 mg by mouth Daily for one week each month       triamcinolone (KENALOG) 0.025 % external ointment Apply topically 2 times daily To lower legs        Controlled medications:   not applicable/none     Past Medical History:   Past Medical History:   Diagnosis Date     Diabetes (H)      Hypertension      Hypothyroid      Physical Exam:   Vitals: /67   Pulse 79   Temp 98.7  F (37.1  C)   Resp 18   Wt 108.4 kg (239 lb)   SpO2 99%   BMI 45.16 kg/m    BMI: Body mass index is 45.16 kg/m .  GENERAL APPEARANCE:  Alert, in no distress, cooperative  ENT:  Mouth normal, moist mucous membranes, normal hearing acuity  EYES:  Conjunctiva and lids normal  RESP:  no respiratory distress, on room air, LSC  CV: LE edema +1 compression socks in place  NEURO:   No facial asymmetry, speech clear  PSYCH:  oriented X 3, affect and mood normal     SNF labs:     Most Recent 3 CBC's:Recent Labs   Lab Test 01/10/23  0734 01/09/23  1104 11/30/22  0920   WBC 10.0 12.8* 8.3   HGB 12.7 14.3 15.2   MCV 89 89 89    290 222     Most Recent 3 BMP's:Recent Labs   Lab Test 01/18/23  0615 01/14/23  1204 01/14/23  0801  01/09/23  1824 01/09/23  1104 11/30/22  1121 11/30/22  0920     --   --   --  134  --  135   POTASSIUM 4.2  --   --   --  4.1  --  4.6   CHLORIDE 103  --   --   --  103  --  103   CO2 24  --   --   --  23  --  23   BUN 20.2  --   --   --  22  --  18   CR 0.64  --   --   --  0.62  --  0.67   ANIONGAP 9  --   --   --  8  --  9   KATERIN 9.6  --   --   --  9.6  --  9.8   * 135* 153*   < > 273*   < > 432*    < > = values in this interval not displayed.     Most Recent Hemoglobin A1c:Recent Labs   Lab Test 11/30/22  0920   A1C 9.8*       DISCHARGE PLAN:    Follow up labs: No labs orders/due    Medical Follow Up:      Follow up with primary care provider in 2-3 weeks  Follow up with specialist NSGY as planned     Current [place] scheduled appointments:   No future appointments.     Discharge Services: Home Care:  Occupational Therapy, Physical Therapy, Registered Nurse, Home Health Aide and From:  [facility]    Discharge Instructions Verbalized to Patient at Discharge:     None    TOTAL DISCHARGE TIME:   Greater than 30 minutes  Electronically signed by:  HANY Tejeda CNP     Home care Face to Face documentation done in [employer] attached to Home care orders for [facility].

## 2023-02-09 ENCOUNTER — HOSPITAL ENCOUNTER (OUTPATIENT)
Dept: GENERAL RADIOLOGY | Facility: CLINIC | Age: 78
Discharge: HOME | End: 2023-02-09
Attending: RADIOLOGY
Payer: MEDICARE

## 2023-02-09 ENCOUNTER — HOSPITAL ENCOUNTER (OUTPATIENT)
Facility: CLINIC | Age: 78
Discharge: HOME | End: 2023-02-09
Payer: MEDICARE

## 2023-02-09 VITALS
SYSTOLIC BLOOD PRESSURE: 150 MMHG | DIASTOLIC BLOOD PRESSURE: 62 MMHG | HEART RATE: 79 BPM | RESPIRATION RATE: 20 BRPM | OXYGEN SATURATION: 96 %

## 2023-02-09 VITALS — OXYGEN SATURATION: 99 % | SYSTOLIC BLOOD PRESSURE: 153 MMHG | HEART RATE: 75 BPM | DIASTOLIC BLOOD PRESSURE: 78 MMHG

## 2023-02-09 DIAGNOSIS — M54.16 LUMBAR RADICULOPATHY, ACUTE: ICD-10-CM

## 2023-02-09 PROCEDURE — 999N000154 HC STATISTIC RADIOLOGY XRAY, US, CT, MAR, NM

## 2023-02-09 PROCEDURE — 250N000011 HC RX IP 250 OP 636

## 2023-02-09 PROCEDURE — 250N000009 HC RX 250

## 2023-02-09 PROCEDURE — 255N000002 HC RX 255 OP 636

## 2023-02-09 PROCEDURE — 64483 NJX AA&/STRD TFRM EPI L/S 1: CPT

## 2023-02-09 RX ORDER — IOPAMIDOL 408 MG/ML
10 INJECTION, SOLUTION INTRATHECAL ONCE
Status: COMPLETED | OUTPATIENT
Start: 2023-02-09 | End: 2023-02-09

## 2023-02-09 RX ORDER — DEXAMETHASONE SODIUM PHOSPHATE 10 MG/ML
10 INJECTION, SOLUTION INTRAMUSCULAR; INTRAVENOUS ONCE
Status: COMPLETED | OUTPATIENT
Start: 2023-02-09 | End: 2023-02-09

## 2023-02-09 RX ADMIN — IOPAMIDOL 2 ML: 408 INJECTION, SOLUTION INTRATHECAL at 14:40

## 2023-02-09 RX ADMIN — LIDOCAINE HYDROCHLORIDE 7 ML: 10 INJECTION, SOLUTION EPIDURAL; INFILTRATION; INTRACAUDAL; PERINEURAL at 14:30

## 2023-02-09 RX ADMIN — DEXAMETHASONE SODIUM PHOSPHATE 10 MG: 10 INJECTION, SOLUTION INTRAMUSCULAR; INTRAVENOUS at 14:42

## 2023-02-09 ASSESSMENT — ACTIVITIES OF DAILY LIVING (ADL): ADLS_ACUITY_SCORE: 35

## 2023-02-09 NOTE — DISCHARGE INSTRUCTIONS
Steroid Injection Discharge Instructions     After you go home:    You may resume your normal diet.    Care of Puncture Site:    If you have a bandaid on your puncture site, you may remove it the next morning  You may shower tomorrow  No bath tubs, whirlpools or swimming pool for at least 48 hours  Use ice packs as needed for discomfort     Activity:    Minimize your activity today. You may gradually resume your normal activity as tolerated  Avoid vigorous or strenuous activity until your symptoms improve or as directed by your doctor  Do NOT drive a vehicle for a few hours after the injection - or longer if you develop numbness in your arm or leg    Medicines:    You may resume all medications, including blood thinners  Resume your Warfarin/Coumadin at your regular dose today. Follow up with your provider to have your INR rechecked  Resume your Platelet Inhibitors and Aspirin tomorrow at your regular dose  For minor discomfort, you may take Acetaminophen (Tylenol) or Ibuprofen (Advil)    Pain:     You may experience increased or different pain over the next 24-48 hours  For the next 48 hrs - you may use ice packs for discomfort     Call your primary care doctor if:    You have severe pain that does not improve with pain medication  You have chills or a fever greater than 101 F (38 C)  The site is red, swollen, hot or tender  Increase in pain, weakness or numbness  New problems with your bowel or bladder  Any questions or concerns    What to watch for:    It can be normal to have some bruising or slight swelling at the puncture site.   After the procedure, you may have some new weakness or numbness down your arm/leg from the numbing medicine. This should resolve in a few hours.   You may feel some temporary relief from the numbing medicine, but that will wear off within a few hours.  Your symptoms may return to pre procedure level, or can even be worse for the first 1-2 days.  For many people, the steroid begins to  provide some relief within 2-3 days, but it can take up to 2 weeks to obtain the full results.  Some people will get lasting relief from a single injection. Others may require up to 3 injections to get results. If you have more than one steroid injection, they should be given 2 weeks apart.  If you have no improvement in your symptoms after two weeks, please contact the doctor who ordered this procedure to discuss the next steps.  Side effects of your steroid injection are mild and will go away in 2-3 days  Insomnia  Irritability  Flushed face  Water retention  Restlessness  Difficulty sleeping  Increased appetite  Increased blood sugar  If you are diabetic, monitor your blood sugar closely. Contact the provider who manages your diabetes to help you control your blood sugar if needed.    If you have questions or concerns call:                  [facility] Radiology Dept @ [phone]                                    between 8am-4:30pm Mon-Fri    If you have urgent questions outside of these normal business hours, please contact the [facility] on call doctor @ [phone]      The provider who performed your procedure was ______Nick___________.

## 2023-02-09 NOTE — PROCEDURES
[facility]    Procedure: Fluoroscopic-Guided Left L5-S1 Transforaminal Lumbar Epidural Steroid Injection    Date/Time: 2/9/2023 2:51 PM  Performed by: Evan Sparks PA-C  Authorized by: Evan Sparks PA-C       UNIVERSAL PROTOCOL   Site Marked: Yes  Prior Images Obtained and Reviewed:  Yes  Required items: Required blood products, implants, devices and special equipment available    Patient identity confirmed:  Verbally with patient  NA - No sedation, light sedation, or local anesthesia  Confirmation Checklist:  Patient's identity using two indicators, relevant allergies, procedure was appropriate and matched the consent or emergent situation and correct equipment/implants were available  Time out: Immediately prior to the procedure a time out was called    Universal Protocol: the Joint Commission Universal Protocol was followed    Preparation: Patient was prepped and draped in usual sterile fashion    ESBL (mL):  0     ANESTHESIA    Anesthesia: Local infiltration  Local Anesthetic:  Lidocaine 1% without epinephrine  Anesthetic Total (mL):  3      SEDATION    Patient Sedated: No    See dictated procedure note for full details.    PROCEDURE  Describe Procedure: Fluoroscopic-Guided Left L5-S1 Transforaminal Lumbar Epidural Steroid Injection  Patient Tolerance:  Patient tolerated the procedure well with no immediate complications  Length of time physician/provider present for 1:1 monitoring during sedation: 0

## 2023-02-09 NOTE — PROGRESS NOTES
Care Suites Discharge Nursing Note    Patient Information  Name: Coretta Kolb  Age: 77 year old    Discharge Education:  Discharge instructions reviewed: Yes  Additional education/resources provided: NA  Patient/patient representative verbalizes understanding: Yes  Patient discharging on new medications: No  Medication education completed: N/A    Discharge Plans:   Discharge location: home  Discharge ride contacted: Yes  Approximate discharge time: 1520    Discharge Criteria:  Discharge criteria met and vital signs stable: Yes. Pt wheeled back to [place] with friend.     Patient Belongs:  Patient belongings returned to patient: Yes    Nora Haley RN

## 2023-02-16 ENCOUNTER — TRANSFERRED RECORDS (OUTPATIENT)
Dept: HEALTH INFORMATION MANAGEMENT | Facility: CLINIC | Age: 78
End: 2023-02-16

## 2023-02-28 ENCOUNTER — TELEPHONE (OUTPATIENT)
Dept: PALLIATIVE MEDICINE | Facility: CLINIC | Age: 78
End: 2023-02-28

## 2023-02-28 ENCOUNTER — APPOINTMENT (OUTPATIENT)
Dept: LAB | Facility: CLINIC | Age: 78
End: 2023-02-28
Payer: MEDICARE

## 2023-02-28 PROCEDURE — 83036 HEMOGLOBIN GLYCOSYLATED A1C: CPT | Mod: ORL | Performed by: FAMILY MEDICINE

## 2023-02-28 NOTE — TELEPHONE ENCOUNTER
Received recent progress note from Lori Hernandez's office.      Lorin Arcata  Patient Representative  [facility] [place]

## 2023-02-28 NOTE — TELEPHONE ENCOUNTER
"Received 2/28/2023    [facility] (Lori LEACH, RICKEY) faxed an injection order for left L3-4 transforaminal CHANTAL for lumbar radiculopathy, acute.    Under the \"To Be Performed at\" section listed [facility] [place].    Called Lori Hernandez's office and left a voice message to clarify whether their order was sent to us in error or meant to list one of the [facility] [place] locations for injection to be performed at.    If the injection's supposed to be scheduled with [facility], additional info is needed:    Updated injection order    Progress notes from the past 3 office visits along with any pertinent information that may help our pain specialist (i.e. imaging)      Lorin Denver  Patient Representative  [facility] [place]  "

## 2023-02-28 NOTE — TELEPHONE ENCOUNTER
Received an updated injection order from Lori Hernandez's office.    Faxed them back requesting progress notes from the past 3 office visits along with any pertinent information that may help our pain specialist (i.e. imaging). Fax confirmation received.      Lorin Luis  Patient Representative  [facility] [place]

## 2023-02-28 NOTE — TELEPHONE ENCOUNTER
Injection  reviewed original order and chart - suspected order was meant for the hospital in [place] and called patient to verify. Patient confirmed she wants the injection done at the same place.    Faxed injection order to [phone]. Fax confirmation received.        Lorin [place]  Patient Representative  [facility] [place]

## 2023-03-06 ENCOUNTER — TELEPHONE (OUTPATIENT)
Dept: MEDSURG UNIT | Facility: CLINIC | Age: 78
End: 2023-03-06
Payer: MEDICARE

## 2023-03-08 ENCOUNTER — TELEPHONE (OUTPATIENT)
Dept: MEDSURG UNIT | Facility: CLINIC | Age: 78
End: 2023-03-08
Payer: MEDICARE

## 2023-03-08 NOTE — TELEPHONE ENCOUNTER
Chart reviewed for epidural injection on 03/10/23. CSE. No pertinent allergies. No blood thinners listed.

## 2023-03-09 RX ORDER — DEXTROSE MONOHYDRATE 25 G/50ML
25-50 INJECTION, SOLUTION INTRAVENOUS
Status: DISCONTINUED | OUTPATIENT
Start: 2023-03-09 | End: 2023-03-11 | Stop reason: HOSPADM

## 2023-03-09 RX ORDER — LIDOCAINE 40 MG/G
CREAM TOPICAL
Status: CANCELLED | OUTPATIENT
Start: 2023-03-09

## 2023-03-09 RX ORDER — NICOTINE POLACRILEX 4 MG
15-30 LOZENGE BUCCAL
Status: DISCONTINUED | OUTPATIENT
Start: 2023-03-09 | End: 2023-03-11 | Stop reason: HOSPADM

## 2023-03-10 ENCOUNTER — HOSPITAL ENCOUNTER (OUTPATIENT)
Facility: CLINIC | Age: 78
Discharge: HOME | End: 2023-03-10
Admitting: PHYSICIAN ASSISTANT
Payer: MEDICARE

## 2023-03-10 ENCOUNTER — HOSPITAL ENCOUNTER (OUTPATIENT)
Dept: GENERAL RADIOLOGY | Facility: CLINIC | Age: 78
Discharge: HOME | End: 2023-03-10
Attending: NURSE PRACTITIONER
Payer: MEDICARE

## 2023-03-10 VITALS — HEART RATE: 76 BPM | SYSTOLIC BLOOD PRESSURE: 144 MMHG | DIASTOLIC BLOOD PRESSURE: 74 MMHG | OXYGEN SATURATION: 99 %

## 2023-03-10 VITALS
HEART RATE: 78 BPM | SYSTOLIC BLOOD PRESSURE: 144 MMHG | DIASTOLIC BLOOD PRESSURE: 72 MMHG | OXYGEN SATURATION: 100 % | RESPIRATION RATE: 16 BRPM

## 2023-03-10 DIAGNOSIS — M54.16 LUMBAR RADICULOPATHY, ACUTE: ICD-10-CM

## 2023-03-10 PROCEDURE — 64483 NJX AA&/STRD TFRM EPI L/S 1: CPT

## 2023-03-10 PROCEDURE — 999N000154 HC STATISTIC RADIOLOGY XRAY, US, CT, MAR, NM

## 2023-03-10 PROCEDURE — 250N000009 HC RX 250: Performed by: NURSE PRACTITIONER

## 2023-03-10 PROCEDURE — 250N000011 HC RX IP 250 OP 636: Performed by: NURSE PRACTITIONER

## 2023-03-10 PROCEDURE — 255N000002 HC RX 255 OP 636: Performed by: NURSE PRACTITIONER

## 2023-03-10 RX ORDER — DEXAMETHASONE SODIUM PHOSPHATE 10 MG/ML
10 INJECTION, SOLUTION INTRAMUSCULAR; INTRAVENOUS ONCE
Status: COMPLETED | OUTPATIENT
Start: 2023-03-10 | End: 2023-03-10

## 2023-03-10 RX ORDER — IOPAMIDOL 408 MG/ML
10 INJECTION, SOLUTION INTRATHECAL ONCE
Status: COMPLETED | OUTPATIENT
Start: 2023-03-10 | End: 2023-03-10

## 2023-03-10 RX ADMIN — DEXAMETHASONE SODIUM PHOSPHATE 20 MG: 10 INJECTION INTRAMUSCULAR; INTRAVENOUS at 13:30

## 2023-03-10 RX ADMIN — LIDOCAINE HYDROCHLORIDE 6 ML: 10 INJECTION, SOLUTION EPIDURAL; INFILTRATION; INTRACAUDAL; PERINEURAL at 13:22

## 2023-03-10 RX ADMIN — IOPAMIDOL 1 ML: 408 INJECTION, SOLUTION INTRATHECAL at 13:26

## 2023-03-10 ASSESSMENT — ACTIVITIES OF DAILY LIVING (ADL): ADLS_ACUITY_SCORE: 35

## 2023-03-10 NOTE — PROCEDURES
[facility]    Procedure: Left L3-L4 TFESI    Date/Time: 3/10/2023 1:35 PM  Performed by: Edgar Andrew PA-C  Authorized by: Edgar Andrew PA-C       UNIVERSAL PROTOCOL   Site Marked: Yes  Prior Images Obtained and Reviewed:  Yes  Required items: Required blood products, implants, devices and special equipment available    Patient identity confirmed:  Verbally with patient  Patient was reevaluated immediately before administering moderate or deep sedation or anesthesia  Confirmation Checklist:  Patient's identity using two indicators, relevant allergies, procedure was appropriate and matched the consent or emergent situation and correct equipment/implants were available  Time out: Immediately prior to the procedure a time out was called    Universal Protocol: the Joint Commission Universal Protocol was followed    Preparation: Patient was prepped and draped in usual sterile fashion       ANESTHESIA    Local Anesthetic: Lidocaine 1% without epinephrine      SEDATION    Patient Sedated: No    See dictated procedure note for full details.    PROCEDURE  Describe Procedure: Very tight foramen. Pt tolerated the case very well however.  Patient Tolerance:  Patient tolerated the procedure well with no immediate complications  Length of time physician/provider present for 1:1 monitoring during sedation: 0

## 2023-03-10 NOTE — PROGRESS NOTES
Care Suites Discharge Nursing Note    Patient Information  Name: Coretta Kolb  Age: 77 year old    Discharge Education:  Discharge instructions reviewed: Yes  Additional education/resources provided: NA  Patient/patient representative verbalizes understanding: Yes  Patient discharging on new medications: No  Medication education completed: N/A    Discharge Plans:   Discharge location: home  Discharge ride contacted: Yes  Approximate discharge time: 1420    Discharge Criteria:  Discharge criteria met and vital signs stable: Yes. Tolerated PO.  Denies pain, numbness or any discomfort at this time.  Ambulated with walker, steady on her feet.    Patient Belongs:  Patient belongings returned to patient: Yes    Anais Wise RN

## 2023-03-10 NOTE — DISCHARGE INSTRUCTIONS
Steroid Injection Discharge Instructions     After you go home:    You may resume your normal diet.    Care of Puncture Site:    If you have a bandaid on your puncture site, you may remove it the next morning  You may shower tomorrow  No bath tubs, whirlpools or swimming pool for at least 48 hours  Use ice packs as needed for discomfort     Activity:    Minimize your activity today. You may gradually resume your normal activity as tolerated  Avoid vigorous or strenuous activity until your symptoms improve or as directed by your doctor  Do NOT drive a vehicle for a few hours after the injection - or longer if you develop numbness in your arm or leg    Medicines:    You may resume all medications, including blood thinners  Resume your Warfarin/Coumadin at your regular dose today. Follow up with your provider to have your INR rechecked  Resume your Platelet Inhibitors and Aspirin tomorrow at your regular dose  For minor discomfort, you may take Acetaminophen (Tylenol) or Ibuprofen (Advil)    Pain:     You may experience increased or different pain over the next 24-48 hours  For the next 48 hrs - you may use ice packs for discomfort     Call your primary care doctor if:    You have severe pain that does not improve with pain medication  You have chills or a fever greater than 101 F (38 C)  The site is red, swollen, hot or tender  Increase in pain, weakness or numbness  New problems with your bowel or bladder  Any questions or concerns    What to watch for:    It can be normal to have some bruising or slight swelling at the puncture site.   After the procedure, you may have some new weakness or numbness down your arm/leg from the numbing medicine. This should resolve in a few hours.   You may feel some temporary relief from the numbing medicine, but that will wear off within a few hours.  Your symptoms may return to pre procedure level, or can even be worse for the first 1-2 days.  For many people, the steroid begins to  provide some relief within 2-3 days, but it can take up to 2 weeks to obtain the full results.  Some people will get lasting relief from a single injection. Others may require up to 3 injections to get results. If you have more than one steroid injection, they should be given 2 weeks apart.  If you have no improvement in your symptoms after two weeks, please contact the doctor who ordered this procedure to discuss the next steps.  Side effects of your steroid injection are mild and will go away in 2-3 days  Insomnia  Irritability  Flushed face  Water retention  Restlessness  Difficulty sleeping  Increased appetite  Increased blood sugar  If you are diabetic, monitor your blood sugar closely. Contact the provider who manages your diabetes to help you control your blood sugar if needed.    If you have questions or concerns call:                  [facility] Radiology Dept @ [phone]                                    between 8am-4:30pm Mon-Fri    If you have urgent questions outside of these normal business hours, please contact the [facility] on call doctor @ [phone]

## 2023-03-14 ENCOUNTER — LAB REQUISITION (OUTPATIENT)
Dept: LAB | Facility: CLINIC | Age: 78
End: 2023-03-14
Payer: MEDICARE

## 2023-03-14 DIAGNOSIS — E03.9 HYPOTHYROIDISM, UNSPECIFIED: ICD-10-CM

## 2023-03-14 LAB — TSH SERPL DL<=0.005 MIU/L-ACNC: 3.25 UIU/ML (ref 0.3–4.2)

## 2023-03-14 PROCEDURE — 84443 ASSAY THYROID STIM HORMONE: CPT | Mod: ORL | Performed by: FAMILY MEDICINE

## 2023-07-08 ENCOUNTER — HOSPITAL ENCOUNTER (EMERGENCY)
Facility: CLINIC | Age: 78
Discharge: HOME | End: 2023-07-08
Attending: PHYSICIAN ASSISTANT | Admitting: PHYSICIAN ASSISTANT
Payer: MEDICARE

## 2023-07-08 ENCOUNTER — APPOINTMENT (OUTPATIENT)
Dept: ULTRASOUND IMAGING | Facility: CLINIC | Age: 78
End: 2023-07-08
Attending: PHYSICIAN ASSISTANT
Payer: MEDICARE

## 2023-07-08 ENCOUNTER — APPOINTMENT (OUTPATIENT)
Dept: GENERAL RADIOLOGY | Facility: CLINIC | Age: 78
End: 2023-07-08
Attending: EMERGENCY MEDICINE
Payer: MEDICARE

## 2023-07-08 VITALS
DIASTOLIC BLOOD PRESSURE: 79 MMHG | TEMPERATURE: 98.1 F | SYSTOLIC BLOOD PRESSURE: 162 MMHG | BODY MASS INDEX: 45.12 KG/M2 | HEART RATE: 73 BPM | RESPIRATION RATE: 18 BRPM | WEIGHT: 239 LBS | OXYGEN SATURATION: 99 % | HEIGHT: 61 IN

## 2023-07-08 DIAGNOSIS — S93.401A RIGHT ANKLE SPRAIN: ICD-10-CM

## 2023-07-08 DIAGNOSIS — M25.571 PAIN IN JOINT, ANKLE AND FOOT, RIGHT: ICD-10-CM

## 2023-07-08 PROCEDURE — 73610 X-RAY EXAM OF ANKLE: CPT | Mod: RT

## 2023-07-08 PROCEDURE — 99284 EMERGENCY DEPT VISIT MOD MDM: CPT | Mod: 25

## 2023-07-08 PROCEDURE — 93971 EXTREMITY STUDY: CPT | Mod: RT

## 2023-07-08 ASSESSMENT — ACTIVITIES OF DAILY LIVING (ADL)
ADLS_ACUITY_SCORE: 35
ADLS_ACUITY_SCORE: 35

## 2023-07-09 NOTE — ED TRIAGE NOTES
Right lateral ankle pain, started to hurt this morning, progressively getting worse. After 1600 couldn't walk on it. Pt uses a walker. Denies trauma.

## 2023-07-09 NOTE — ED PROVIDER NOTES
"  History     Chief Complaint:  Ankle Pain       The history is provided by the patient.      Coretta Kolb is a 77 year old female with history of type 2 diabetes mellitus with neuropathy and enthesopathy of left foot who presents to the ED for evaluation of ankle pain. She reports right ankle pain with radiation up her right shin began this morning suddenly when she stepped out of bed and put weight on it. Pain worsened throughout the day as she walked on it. She mentions she typically uses a walker for troubles with her left knee. She denies trauma. No fever, chills, or redness.  No chest pain or sob.    Independent Historian:   None - Patient Only    Review of External Notes:   none    Medications:    Norvasc  Cymbalta  Neurontin  Lantus  Synthroid  Zestril  Crestor    Past Medical History:    Diabetes mellitus, type 2 with moderate non proliferative diabetic retinopathy with macular edema, bilateral  Microalbuminuria due to type 2 diabetes mellitus  Painful diabetic neuropathy  IFG  Osteoarthritis, left wrist  Basal cell carcinoma of face  Hypertension  Hypothyroidism  Dyslipidemia  Pulmonary nodules  SNHL  Lichen sclerous  Tailor's bunion  Lumbar radiculopathy  Enthesopathy, left foot  Plantar fascial fibromatosis  Edema  JESUS  MDD  Cataract, both eyes, age-related  Corneal dystrophy    Past Surgical History:    Endometrial biopsy  Surgery right foot x4  Surgery left foot  Echocardiogram  Transesophageal echocardiogram  Knee arthroscopic surgery for medial meniscal tear, bilateral  Hysteroscopy  D&C  Colonoscopy  Spine surgery    Physical Exam     Patient Vitals for the past 24 hrs:   BP Temp Temp src Pulse Resp SpO2 Height Weight   07/08/23 2054 (!) 162/79 -- -- 73 18 99 % -- --   07/08/23 1951 (!) 174/80 98.1  F (36.7  C) Temporal 73 16 96 % 1.549 m (5' 1\") 108.4 kg (239 lb)     Physical Exam  General: Alert and oriented.    Head: Normocephalic. External ears and nose normal.    Eyes: Pupils equal and " round.  Normal tracking.    Pulmonary/Chest: Effort and rate normal.    MSK:  Mild ttp over lateral malleolus.  Pain with active ROM of ankle.  No pain with passive ROM.  No redness warmth, rash or fluctuance.No edema.  Negative squeeze test for syndesmosis injury. No focal lateral talar tenderness. No tenderness over shaft or proximal tibia/fibula and normal ROM in knee without pain.       SKIN:  Warm and dry with strong DP or posterior tibial pulses w/cap refill <2 seconds.  No bruising or swelling over plantar surface of foot.    NEURO:  Normal strength and sensation throughout the foot and toes.     PSYCH:  Normal affect      Emergency Department Course     Imaging:  US Lower Extremity Venous Duplex Right   Final Result   IMPRESSION:   1.  No deep venous thrombosis in the right lower extremity.      Ankle XR, G/E 3 views, right   Final Result   IMPRESSION: Soft tissue swelling around the ankle. No acute fracture or malalignment. No significant degenerative changes. The ankle mortise is congruent. Plantar calcaneal enthesophyte. Calcification along the plantar fascia. Partially imaged plate and    screw fixation of the fifth metatarsal.         Report per radiology    Emergency Department Course & Assessments:    Interventions:  Medications - No data to display     Assessments:  2054 I obtained history and examined the patient as noted above.     Independent Interpretation (X-rays, CTs, rhythm strip):  I reviewed x-ray which shoes no evidence of fracture or dislocation.  Does shows some mild soft tissue swelling to lateral malleolus.    Consultations/Discussion of Management or Tests:  None    Social Determinants of Health affecting care:   none  Disposition:  Discharged to home    Impression & Plan    CMS Diagnoses: None    Medical Decision Making:  Coretta Kolb is a 77 year old female who presents for evaluation of ankle pain that began suddenly when she stepped out of bed putting weight on it this am.   Signs and symptoms are consistent with an ankle sprain, a radiograph of the ankle is negative for acute bony injury.  No convincing evidence of high ankle sprain in the ED.  Exam of foot/proximal tibia, fibula, and knee does not suggest referred pain.  No signs of achilles tendon rupture.  US negative for DVT.  No evidence of septic joint, inflammatory arthritis, skin or soft tissue infection, CMS intact no evidence of comparment syndrome or arterial ischemia.  The patients neurovascular status is normal no evidence of compartment syndrome or neurovascular injury. A head to toe trauma exam is otherwise negative.      Plan is for protected weightbearing, as tolerated.  RICE treatment   Patient will advance weightbearing and follow-up with ortho in 7-10 days if not improving as expected for ankle sprain for further evaluation.  They will begin gentle ROM exercises of the ankle.       Diagnosis:    ICD-10-CM    1. Pain in joint, ankle and foot, right  M25.571       2. Right ankle sprain  S93.401A     suspected           Discharge Medications:  Discharge Medication List as of 7/8/2023 11:51 PM             Scribe Disclosure:  Elke MERINO Hailie, am serving as a scribe at 9:02 PM on 7/8/2023 to document services personally performed by Arthur Araya PA-C based on my observations and the provider's statements to me.   7/8/2023   Arthur Araya PA-C Etten, Clark Ellsworth, PA-C  07/09/23 1257

## 2024-02-13 ENCOUNTER — HOSPITAL ENCOUNTER (INPATIENT)
Facility: CLINIC | Age: 79
LOS: 8 days | Discharge: NURSING, REHAB OR LONG-TERM CARE | DRG: 637 | End: 2024-02-23
Attending: EMERGENCY MEDICINE | Admitting: INTERNAL MEDICINE
Payer: MEDICARE

## 2024-02-13 ENCOUNTER — APPOINTMENT (OUTPATIENT)
Dept: CT IMAGING | Facility: CLINIC | Age: 79
DRG: 637 | End: 2024-02-13
Attending: EMERGENCY MEDICINE
Payer: MEDICARE

## 2024-02-13 DIAGNOSIS — R52 PAIN: ICD-10-CM

## 2024-02-13 DIAGNOSIS — Z79.4 TYPE 2 DIABETES MELLITUS WITH DIABETIC POLYNEUROPATHY, WITH LONG-TERM CURRENT USE OF INSULIN (H): ICD-10-CM

## 2024-02-13 DIAGNOSIS — E11.42 TYPE 2 DIABETES MELLITUS WITH DIABETIC POLYNEUROPATHY, WITH LONG-TERM CURRENT USE OF INSULIN (H): ICD-10-CM

## 2024-02-13 DIAGNOSIS — B00.1 HERPES LABIALIS: ICD-10-CM

## 2024-02-13 DIAGNOSIS — I10 BENIGN ESSENTIAL HYPERTENSION: ICD-10-CM

## 2024-02-13 DIAGNOSIS — R41.0 CONFUSION: ICD-10-CM

## 2024-02-13 DIAGNOSIS — R73.9 HYPERGLYCEMIA: ICD-10-CM

## 2024-02-13 DIAGNOSIS — N39.0 URINARY TRACT INFECTION WITHOUT HEMATURIA, SITE UNSPECIFIED: Primary | ICD-10-CM

## 2024-02-13 LAB
ALBUMIN SERPL BCG-MCNC: 3.6 G/DL (ref 3.5–5.2)
ALBUMIN UR-MCNC: 300 MG/DL
ALP SERPL-CCNC: 147 U/L (ref 40–150)
ALT SERPL W P-5'-P-CCNC: 12 U/L (ref 0–50)
ANION GAP SERPL CALCULATED.3IONS-SCNC: 10 MMOL/L (ref 7–15)
APPEARANCE UR: CLEAR
AST SERPL W P-5'-P-CCNC: 16 U/L (ref 0–45)
BASOPHILS # BLD AUTO: 0.1 10E3/UL (ref 0–0.2)
BASOPHILS NFR BLD AUTO: 1 %
BILIRUB DIRECT SERPL-MCNC: <0.2 MG/DL (ref 0–0.3)
BILIRUB SERPL-MCNC: 0.3 MG/DL
BILIRUB UR QL STRIP: NEGATIVE
BUN SERPL-MCNC: 10.4 MG/DL (ref 8–23)
CALCIUM SERPL-MCNC: 9.5 MG/DL (ref 8.8–10.2)
CHLORIDE SERPL-SCNC: 96 MMOL/L (ref 98–107)
COLOR UR AUTO: ABNORMAL
CREAT SERPL-MCNC: 0.72 MG/DL (ref 0.51–0.95)
DEPRECATED HCO3 PLAS-SCNC: 28 MMOL/L (ref 22–29)
EGFRCR SERPLBLD CKD-EPI 2021: 86 ML/MIN/1.73M2
EOSINOPHIL # BLD AUTO: 0.3 10E3/UL (ref 0–0.7)
EOSINOPHIL NFR BLD AUTO: 4 %
ERYTHROCYTE [DISTWIDTH] IN BLOOD BY AUTOMATED COUNT: 13.4 % (ref 10–15)
FLUAV RNA SPEC QL NAA+PROBE: NEGATIVE
FLUBV RNA RESP QL NAA+PROBE: NEGATIVE
GLUCOSE BLDC GLUCOMTR-MCNC: 351 MG/DL (ref 70–99)
GLUCOSE SERPL-MCNC: 431 MG/DL (ref 70–99)
GLUCOSE UR STRIP-MCNC: >=1000 MG/DL
HCT VFR BLD AUTO: 44.4 % (ref 35–47)
HGB BLD-MCNC: 15.1 G/DL (ref 11.7–15.7)
HGB UR QL STRIP: ABNORMAL
HOLD SPECIMEN: NORMAL
HOLD SPECIMEN: NORMAL
HYALINE CASTS: 16 /LPF
IMM GRANULOCYTES # BLD: 0 10E3/UL
IMM GRANULOCYTES NFR BLD: 0 %
KETONES UR STRIP-MCNC: NEGATIVE MG/DL
LEUKOCYTE ESTERASE UR QL STRIP: ABNORMAL
LYMPHOCYTES # BLD AUTO: 2.3 10E3/UL (ref 0.8–5.3)
LYMPHOCYTES NFR BLD AUTO: 26 %
MCH RBC QN AUTO: 29.5 PG (ref 26.5–33)
MCHC RBC AUTO-ENTMCNC: 34 G/DL (ref 31.5–36.5)
MCV RBC AUTO: 87 FL (ref 78–100)
MONOCYTES # BLD AUTO: 0.7 10E3/UL (ref 0–1.3)
MONOCYTES NFR BLD AUTO: 8 %
MUCOUS THREADS #/AREA URNS LPF: PRESENT /LPF
NEUTROPHILS # BLD AUTO: 5.5 10E3/UL (ref 1.6–8.3)
NEUTROPHILS NFR BLD AUTO: 61 %
NITRATE UR QL: NEGATIVE
NRBC # BLD AUTO: 0 10E3/UL
NRBC BLD AUTO-RTO: 0 /100
PH UR STRIP: 6 [PH] (ref 5–7)
PLATELET # BLD AUTO: 256 10E3/UL (ref 150–450)
POTASSIUM SERPL-SCNC: 3.6 MMOL/L (ref 3.4–5.3)
PROT SERPL-MCNC: 7.1 G/DL (ref 6.4–8.3)
RBC # BLD AUTO: 5.11 10E6/UL (ref 3.8–5.2)
RBC URINE: 12 /HPF
RSV RNA SPEC NAA+PROBE: NEGATIVE
SARS-COV-2 RNA RESP QL NAA+PROBE: NEGATIVE
SODIUM SERPL-SCNC: 134 MMOL/L (ref 135–145)
SP GR UR STRIP: 1.02 (ref 1–1.03)
SQUAMOUS EPITHELIAL: <1 /HPF
UROBILINOGEN UR STRIP-MCNC: NORMAL MG/DL
WBC # BLD AUTO: 8.9 10E3/UL (ref 4–11)
WBC URINE: 28 /HPF
YEAST #/AREA URNS HPF: ABNORMAL /HPF

## 2024-02-13 PROCEDURE — 82248 BILIRUBIN DIRECT: CPT | Performed by: EMERGENCY MEDICINE

## 2024-02-13 PROCEDURE — 80048 BASIC METABOLIC PNL TOTAL CA: CPT | Performed by: EMERGENCY MEDICINE

## 2024-02-13 PROCEDURE — 36415 COLL VENOUS BLD VENIPUNCTURE: CPT | Performed by: EMERGENCY MEDICINE

## 2024-02-13 PROCEDURE — 87086 URINE CULTURE/COLONY COUNT: CPT | Performed by: EMERGENCY MEDICINE

## 2024-02-13 PROCEDURE — 99285 EMERGENCY DEPT VISIT HI MDM: CPT | Mod: 25

## 2024-02-13 PROCEDURE — 84443 ASSAY THYROID STIM HORMONE: CPT | Performed by: INTERNAL MEDICINE

## 2024-02-13 PROCEDURE — 82962 GLUCOSE BLOOD TEST: CPT

## 2024-02-13 PROCEDURE — 81001 URINALYSIS AUTO W/SCOPE: CPT | Performed by: EMERGENCY MEDICINE

## 2024-02-13 PROCEDURE — 70450 CT HEAD/BRAIN W/O DYE: CPT | Mod: MG

## 2024-02-13 PROCEDURE — 96365 THER/PROPH/DIAG IV INF INIT: CPT

## 2024-02-13 PROCEDURE — 85025 COMPLETE CBC W/AUTO DIFF WBC: CPT | Performed by: EMERGENCY MEDICINE

## 2024-02-13 PROCEDURE — 87637 SARSCOV2&INF A&B&RSV AMP PRB: CPT | Performed by: EMERGENCY MEDICINE

## 2024-02-13 PROCEDURE — 84439 ASSAY OF FREE THYROXINE: CPT | Performed by: INTERNAL MEDICINE

## 2024-02-13 PROCEDURE — 83036 HEMOGLOBIN GLYCOSYLATED A1C: CPT | Performed by: INTERNAL MEDICINE

## 2024-02-13 RX ORDER — CEFTRIAXONE 1 G/1
1 INJECTION, POWDER, FOR SOLUTION INTRAMUSCULAR; INTRAVENOUS ONCE
Status: COMPLETED | OUTPATIENT
Start: 2024-02-13 | End: 2024-02-14

## 2024-02-13 ASSESSMENT — ACTIVITIES OF DAILY LIVING (ADL)
ADLS_ACUITY_SCORE: 39
ADLS_ACUITY_SCORE: 39

## 2024-02-13 NOTE — ED TRIAGE NOTES
"Was seen at the eye clinic for injection in retina. Staff there felt the patient was confused and had HTN. Pt states she does seem \"off\" recently. She does state she has frequent urination.      Triage Assessment (Adult)       Row Name 02/13/24 1283          Triage Assessment    Airway WDL WDL        Respiratory WDL    Respiratory WDL WDL        Cardiac WDL    Cardiac WDL X  BP Elevated                     "

## 2024-02-14 PROBLEM — N39.0 URINARY TRACT INFECTION WITHOUT HEMATURIA, SITE UNSPECIFIED: Status: ACTIVE | Noted: 2024-02-14

## 2024-02-14 PROBLEM — R41.0 CONFUSION: Status: ACTIVE | Noted: 2024-02-14

## 2024-02-14 PROBLEM — R73.9 HYPERGLYCEMIA: Status: ACTIVE | Noted: 2024-02-14

## 2024-02-14 LAB
ANION GAP SERPL CALCULATED.3IONS-SCNC: 11 MMOL/L (ref 7–15)
BACTERIA UR CULT: NORMAL
BUN SERPL-MCNC: 11.5 MG/DL (ref 8–23)
CALCIUM SERPL-MCNC: 9.3 MG/DL (ref 8.8–10.2)
CHLORIDE SERPL-SCNC: 100 MMOL/L (ref 98–107)
CREAT SERPL-MCNC: 0.68 MG/DL (ref 0.51–0.95)
DEPRECATED HCO3 PLAS-SCNC: 24 MMOL/L (ref 22–29)
EGFRCR SERPLBLD CKD-EPI 2021: 90 ML/MIN/1.73M2
ERYTHROCYTE [DISTWIDTH] IN BLOOD BY AUTOMATED COUNT: 13.4 % (ref 10–15)
GLUCOSE BLDC GLUCOMTR-MCNC: 222 MG/DL (ref 70–99)
GLUCOSE BLDC GLUCOMTR-MCNC: 270 MG/DL (ref 70–99)
GLUCOSE BLDC GLUCOMTR-MCNC: 294 MG/DL (ref 70–99)
GLUCOSE BLDC GLUCOMTR-MCNC: 295 MG/DL (ref 70–99)
GLUCOSE BLDC GLUCOMTR-MCNC: 311 MG/DL (ref 70–99)
GLUCOSE BLDC GLUCOMTR-MCNC: 332 MG/DL (ref 70–99)
GLUCOSE BLDC GLUCOMTR-MCNC: 508 MG/DL (ref 70–99)
GLUCOSE SERPL-MCNC: 294 MG/DL (ref 70–99)
HBA1C MFR BLD: 11.4 %
HCT VFR BLD AUTO: 41.3 % (ref 35–47)
HGB BLD-MCNC: 14 G/DL (ref 11.7–15.7)
MCH RBC QN AUTO: 29.6 PG (ref 26.5–33)
MCHC RBC AUTO-ENTMCNC: 33.9 G/DL (ref 31.5–36.5)
MCV RBC AUTO: 87 FL (ref 78–100)
PLATELET # BLD AUTO: 143 10E3/UL (ref 150–450)
POTASSIUM SERPL-SCNC: 4.5 MMOL/L (ref 3.4–5.3)
RBC # BLD AUTO: 4.73 10E6/UL (ref 3.8–5.2)
SODIUM SERPL-SCNC: 135 MMOL/L (ref 135–145)
T4 FREE SERPL-MCNC: 0.88 NG/DL (ref 0.9–1.7)
TSH SERPL DL<=0.005 MIU/L-ACNC: 8.11 UIU/ML (ref 0.3–4.2)
WBC # BLD AUTO: 9.7 10E3/UL (ref 4–11)

## 2024-02-14 PROCEDURE — 82962 GLUCOSE BLOOD TEST: CPT

## 2024-02-14 PROCEDURE — G0378 HOSPITAL OBSERVATION PER HR: HCPCS

## 2024-02-14 PROCEDURE — 250N000013 HC RX MED GY IP 250 OP 250 PS 637: Performed by: HOSPITALIST

## 2024-02-14 PROCEDURE — 250N000012 HC RX MED GY IP 250 OP 636 PS 637: Performed by: INTERNAL MEDICINE

## 2024-02-14 PROCEDURE — 250N000011 HC RX IP 250 OP 636: Performed by: EMERGENCY MEDICINE

## 2024-02-14 PROCEDURE — 96376 TX/PRO/DX INJ SAME DRUG ADON: CPT

## 2024-02-14 PROCEDURE — 36415 COLL VENOUS BLD VENIPUNCTURE: CPT | Performed by: INTERNAL MEDICINE

## 2024-02-14 PROCEDURE — 85027 COMPLETE CBC AUTOMATED: CPT | Performed by: INTERNAL MEDICINE

## 2024-02-14 PROCEDURE — 250N000011 HC RX IP 250 OP 636: Performed by: INTERNAL MEDICINE

## 2024-02-14 PROCEDURE — 250N000013 HC RX MED GY IP 250 OP 250 PS 637: Performed by: INTERNAL MEDICINE

## 2024-02-14 PROCEDURE — 82374 ASSAY BLOOD CARBON DIOXIDE: CPT | Performed by: INTERNAL MEDICINE

## 2024-02-14 PROCEDURE — 99223 1ST HOSP IP/OBS HIGH 75: CPT | Mod: AI | Performed by: INTERNAL MEDICINE

## 2024-02-14 PROCEDURE — 96365 THER/PROPH/DIAG IV INF INIT: CPT

## 2024-02-14 RX ORDER — NALOXONE HYDROCHLORIDE 0.4 MG/ML
0.4 INJECTION, SOLUTION INTRAMUSCULAR; INTRAVENOUS; SUBCUTANEOUS
Status: DISCONTINUED | OUTPATIENT
Start: 2024-02-14 | End: 2024-02-23 | Stop reason: HOSPADM

## 2024-02-14 RX ORDER — BISACODYL 10 MG
10 SUPPOSITORY, RECTAL RECTAL DAILY PRN
Status: DISCONTINUED | OUTPATIENT
Start: 2024-02-14 | End: 2024-02-23 | Stop reason: HOSPADM

## 2024-02-14 RX ORDER — INSULIN LISPRO 100 [IU]/ML
50 INJECTION, SUSPENSION SUBCUTANEOUS
Status: DISCONTINUED | OUTPATIENT
Start: 2024-02-14 | End: 2024-02-14

## 2024-02-14 RX ORDER — LIDOCAINE 40 MG/G
CREAM TOPICAL
Status: DISCONTINUED | OUTPATIENT
Start: 2024-02-14 | End: 2024-02-17

## 2024-02-14 RX ORDER — LEVALBUTEROL TARTRATE 45 UG/1
2 AEROSOL, METERED ORAL EVERY 4 HOURS PRN
COMMUNITY
Start: 2023-11-29

## 2024-02-14 RX ORDER — LEVOTHYROXINE SODIUM 100 UG/1
100 TABLET ORAL DAILY
Status: DISCONTINUED | OUTPATIENT
Start: 2024-02-14 | End: 2024-02-14

## 2024-02-14 RX ORDER — ONDANSETRON 4 MG/1
4 TABLET, ORALLY DISINTEGRATING ORAL EVERY 6 HOURS PRN
Status: DISCONTINUED | OUTPATIENT
Start: 2024-02-14 | End: 2024-02-23 | Stop reason: HOSPADM

## 2024-02-14 RX ORDER — AMOXICILLIN 250 MG
1 CAPSULE ORAL 2 TIMES DAILY PRN
Status: DISCONTINUED | OUTPATIENT
Start: 2024-02-14 | End: 2024-02-23 | Stop reason: HOSPADM

## 2024-02-14 RX ORDER — AMLODIPINE BESYLATE 10 MG/1
10 TABLET ORAL DAILY
Status: DISCONTINUED | OUTPATIENT
Start: 2024-02-14 | End: 2024-02-16

## 2024-02-14 RX ORDER — GABAPENTIN 300 MG/1
900 CAPSULE ORAL 2 TIMES DAILY
Status: DISCONTINUED | OUTPATIENT
Start: 2024-02-14 | End: 2024-02-16

## 2024-02-14 RX ORDER — INSULIN LISPRO 100 [IU]/ML
60 INJECTION, SUSPENSION SUBCUTANEOUS
Status: DISCONTINUED | OUTPATIENT
Start: 2024-02-14 | End: 2024-02-14

## 2024-02-14 RX ORDER — PROCHLORPERAZINE 25 MG
12.5 SUPPOSITORY, RECTAL RECTAL EVERY 12 HOURS PRN
Status: DISCONTINUED | OUTPATIENT
Start: 2024-02-14 | End: 2024-02-23 | Stop reason: HOSPADM

## 2024-02-14 RX ORDER — NALOXONE HYDROCHLORIDE 0.4 MG/ML
0.2 INJECTION, SOLUTION INTRAMUSCULAR; INTRAVENOUS; SUBCUTANEOUS
Status: DISCONTINUED | OUTPATIENT
Start: 2024-02-14 | End: 2024-02-23 | Stop reason: HOSPADM

## 2024-02-14 RX ORDER — LEVOTHYROXINE SODIUM 112 UG/1
112 TABLET ORAL DAILY
Status: DISCONTINUED | OUTPATIENT
Start: 2024-02-15 | End: 2024-02-23 | Stop reason: HOSPADM

## 2024-02-14 RX ORDER — ACETAMINOPHEN 325 MG/1
650 TABLET ORAL EVERY 4 HOURS PRN
Status: DISCONTINUED | OUTPATIENT
Start: 2024-02-14 | End: 2024-02-23 | Stop reason: HOSPADM

## 2024-02-14 RX ORDER — POLYETHYLENE GLYCOL 3350 17 G/17G
17 POWDER, FOR SOLUTION ORAL 2 TIMES DAILY PRN
Status: DISCONTINUED | OUTPATIENT
Start: 2024-02-14 | End: 2024-02-23 | Stop reason: HOSPADM

## 2024-02-14 RX ORDER — NICOTINE POLACRILEX 4 MG
15-30 LOZENGE BUCCAL
Status: DISCONTINUED | OUTPATIENT
Start: 2024-02-14 | End: 2024-02-14

## 2024-02-14 RX ORDER — FUROSEMIDE 20 MG
TABLET ORAL
COMMUNITY
Start: 2023-12-06 | End: 2024-03-05

## 2024-02-14 RX ORDER — NICOTINE POLACRILEX 4 MG
15-30 LOZENGE BUCCAL
Status: DISCONTINUED | OUTPATIENT
Start: 2024-02-14 | End: 2024-02-17

## 2024-02-14 RX ORDER — DEXTROSE MONOHYDRATE 25 G/50ML
25-50 INJECTION, SOLUTION INTRAVENOUS
Status: DISCONTINUED | OUTPATIENT
Start: 2024-02-14 | End: 2024-02-17

## 2024-02-14 RX ORDER — PROCHLORPERAZINE MALEATE 5 MG
5 TABLET ORAL EVERY 6 HOURS PRN
Status: DISCONTINUED | OUTPATIENT
Start: 2024-02-14 | End: 2024-02-23 | Stop reason: HOSPADM

## 2024-02-14 RX ORDER — ACETAMINOPHEN 650 MG/1
650 SUPPOSITORY RECTAL EVERY 4 HOURS PRN
Status: DISCONTINUED | OUTPATIENT
Start: 2024-02-14 | End: 2024-02-23 | Stop reason: HOSPADM

## 2024-02-14 RX ORDER — AMOXICILLIN 250 MG
2 CAPSULE ORAL 2 TIMES DAILY PRN
Status: DISCONTINUED | OUTPATIENT
Start: 2024-02-14 | End: 2024-02-23 | Stop reason: HOSPADM

## 2024-02-14 RX ORDER — DEXTROSE MONOHYDRATE 25 G/50ML
25-50 INJECTION, SOLUTION INTRAVENOUS
Status: DISCONTINUED | OUTPATIENT
Start: 2024-02-14 | End: 2024-02-14

## 2024-02-14 RX ORDER — TRAMADOL HYDROCHLORIDE 50 MG/1
50 TABLET ORAL EVERY 6 HOURS PRN
Status: DISCONTINUED | OUTPATIENT
Start: 2024-02-14 | End: 2024-02-23 | Stop reason: HOSPADM

## 2024-02-14 RX ORDER — CEFTRIAXONE 1 G/1
1 INJECTION, POWDER, FOR SOLUTION INTRAMUSCULAR; INTRAVENOUS EVERY 24 HOURS
Status: DISCONTINUED | OUTPATIENT
Start: 2024-02-14 | End: 2024-02-15

## 2024-02-14 RX ORDER — ONDANSETRON 2 MG/ML
4 INJECTION INTRAMUSCULAR; INTRAVENOUS EVERY 6 HOURS PRN
Status: DISCONTINUED | OUTPATIENT
Start: 2024-02-14 | End: 2024-02-23 | Stop reason: HOSPADM

## 2024-02-14 RX ADMIN — LEVOTHYROXINE SODIUM 100 MCG: 100 TABLET ORAL at 08:58

## 2024-02-14 RX ADMIN — INSULIN ASPART 15 UNITS: 100 INJECTION, SOLUTION INTRAVENOUS; SUBCUTANEOUS at 02:47

## 2024-02-14 RX ADMIN — AMLODIPINE BESYLATE 10 MG: 10 TABLET ORAL at 08:56

## 2024-02-14 RX ADMIN — LISINOPRIL 30 MG: 20 TABLET ORAL at 08:56

## 2024-02-14 RX ADMIN — ONDANSETRON 4 MG: 4 TABLET, ORALLY DISINTEGRATING ORAL at 10:03

## 2024-02-14 RX ADMIN — Medication 1 MG: at 20:00

## 2024-02-14 RX ADMIN — INSULIN GLARGINE 32 UNITS: 100 INJECTION, SOLUTION SUBCUTANEOUS at 08:58

## 2024-02-14 RX ADMIN — GABAPENTIN 900 MG: 300 CAPSULE ORAL at 20:00

## 2024-02-14 RX ADMIN — TRAMADOL HYDROCHLORIDE 50 MG: 50 TABLET, COATED ORAL at 09:57

## 2024-02-14 RX ADMIN — INSULIN ASPART 60 UNITS: 100 INJECTION, SUSPENSION SUBCUTANEOUS at 17:55

## 2024-02-14 RX ADMIN — CEFTRIAXONE 1 G: 1 INJECTION, POWDER, FOR SOLUTION INTRAMUSCULAR; INTRAVENOUS at 22:53

## 2024-02-14 RX ADMIN — CEFTRIAXONE SODIUM 1 G: 1 INJECTION, POWDER, FOR SOLUTION INTRAMUSCULAR; INTRAVENOUS at 00:21

## 2024-02-14 RX ADMIN — GABAPENTIN 900 MG: 300 CAPSULE ORAL at 08:56

## 2024-02-14 ASSESSMENT — ENCOUNTER SYMPTOMS
FEVER: 0
SHORTNESS OF BREATH: 0
DYSURIA: 0
WEAKNESS: 0
COUGH: 0
BACK PAIN: 0
VOMITING: 0
NUMBNESS: 0
NECK PAIN: 0
HEMATURIA: 0
DIARRHEA: 0
CHILLS: 0
HEADACHES: 0
RHINORRHEA: 1
FREQUENCY: 1
ABDOMINAL PAIN: 0
NAUSEA: 0
DIZZINESS: 0

## 2024-02-14 ASSESSMENT — ACTIVITIES OF DAILY LIVING (ADL)
ADLS_ACUITY_SCORE: 36
ADLS_ACUITY_SCORE: 41
ADLS_ACUITY_SCORE: 39
ADLS_ACUITY_SCORE: 34
ADLS_ACUITY_SCORE: 36
ADLS_ACUITY_SCORE: 37
ADLS_ACUITY_SCORE: 34
ADLS_ACUITY_SCORE: 39
ADLS_ACUITY_SCORE: 39
ADLS_ACUITY_SCORE: 34
ADLS_ACUITY_SCORE: 34
ADLS_ACUITY_SCORE: 36

## 2024-02-14 NOTE — ED NOTES
"[facility]  ED Nurse Handoff Report    ED Chief complaint: Hypertension      ED Diagnosis:   Final diagnoses:   Urinary tract infection without hematuria, site unspecified   Confusion   Hyperglycemia       Code Status: Per the admitting provider      Allergies:   Allergies   Allergen Reactions    Hydrocodone-Acetaminophen      constipated       Patient Story: Pt  accompanied to the ED with her daughter. Pt repots rt eye was injected at the Clinic yesterday. While at the clinic  pt became confused and not acting normal.  The clinic referred her to the ED for further evaluation. Hx: HTN, DM2       Focused Assessment:  Pt A/O x 4,  denied pain or nausea and is able to communicate needs to staff without difficulty.  Lungs clear bilateral on auscultation. Her  mg/dl and urine findings positive for UTI.     Treatments and/or interventions provided: Lab, Rocephin CT( Head) and  Insulin aspart (Novolog)15 Units @ 0247 administered as order. Next BG due two hours.   Patient's response to treatments and/or interventions: Stable     To be done/followed up on inpatient unit:  See Orders     Does this patient have any cognitive concerns?:    No    Activity level - Baseline/Home:  Independent  Activity Level - Current:   Independent    Patient's Preferred language: English   Needed?: No    Isolation: None  Infection: Not Applicable  Patient tested for COVID 19 prior to admission: YES  Bariatric?: No    Vital Signs:   Vitals:    02/13/24 1706 02/13/24 2000 02/14/24 0025   BP: (!) 193/83 (!) 142/65 (!) 175/105   Pulse: 70     Resp: 18  20   Temp: 98.2  F (36.8  C)     TempSrc: Oral     SpO2: 95%  95%   Weight: 99.8 kg (220 lb)     Height: 1.549 m (5' 1\")         Cardiac Rhythm:      Was the PSS-3 completed:   Yes  What interventions are required if any?               Family Comments: Daughter remains at the bedside  OBS brochure/video discussed/provided to patient/family: Yes              Name of " person given brochure if not patient: N/A               Relationship to patient: N/A     For the majority of the shift this patient's behavior was Green.   Behavioral interventions performed were N/A    ED NURSE PHONE NUMBER: *[phone]

## 2024-02-14 NOTE — PLAN OF CARE
PRIMARY Concern: confusion at eye clinic, hyperglycemia, possible UTI  SAFETY RISK Concerns (fall risk, behaviors, etc.): fall risk  Isolation/Type: none  Tests/Procedures for NEXT shift: OT needs to perform a SLUMS  Consults? (Pending/following, signed-off?) OT, CM/SW  Where is patient from? (Home, TCU, etc.): home  Other Important info for NEXT shift: PO zofran x1  Anticipated DC date & active delays: TBD  _____________________________________________________________________________  SUMMARY NOTE:        Orientation/Cognitive: A/Ox4, forgetful  Observation Goals (Met/ Not Met): not met  Mobility Level/Assist Equipment: A1 GB/W  Antibiotics & Plan (IV/po, length of tx left): Rocephin q24  Pain Management: Headache over the R eye- tramadol given   Tele/VS/O2: VSS on RA ex HTN  ABNL Lab/BG: Plt 143, , 332, 294  Diet: Regular  Bowel/Bladder: incontinent at times  Skin Concerns: blanchable redness to sacrum, rash/robert scaly to BLE L>R  Drains/Devices: PIV SL  Patient Stated Goal for Today: none stated

## 2024-02-14 NOTE — PLAN OF CARE
Goal Outcome Evaluation:    PRIMARY Concern: confusion at eye clinic, hyperglycemia, possible UTI  SAFETY RISK Concerns (fall risk, behaviors, etc.): fall risk  Isolation/Type: none  Tests/Procedures for NEXT shift: AM labs  Consults? (Pending/following, signed-off?) PT, CM/SW  Where is patient from? (Home, TCU, etc.): home  Other Important info for NEXT shift:   Anticipated DC date & active delays: TBD  _____________________________________________________________________________  SUMMARY NOTE:        Orientation/Cognitive: A&Ox4, forgetful  Observation Goals (Met/ Not Met): not met  Mobility Level/Assist Equipment: A1 GB/W  Antibiotics & Plan (IV/po, length of tx left): Rocephin  Pain Management: denies  Tele/VS/O2: VSS on RA ex HTN  ABNL Lab/BG: Na 134, , 295  Diet: Regular  Bowel/Bladder: incontinent at times  Skin Concerns: blanchable redness to sacrum, rash to BLE L>R  Drains/Devices: PIV SL  Patient Stated Goal for Today: none stated        Observation goals  PRIOR TO DISCHARGE       Comments: -diagnostic tests and consults completed and resulted: not met  -vital signs normal or at patient baseline: partially met, HTN  -tolerating oral intake to maintain hydration: met  -returns to baseline functional status: not met  -safe disposition plan has been identified: not met  Nurse to notify provider when observation goals have been met and patient is ready for discharge.

## 2024-02-14 NOTE — PROGRESS NOTES
"MD Notification    Notified Person: MD    Notified Person Name: Abraham Dobson    Notification Date/Time: 02/14/24  5:43 AM    Notification Interaction: vocera text    Purpose of Notification: \". Next Novolog ordered for breakfast (0730). Want novolog now or OK to wait until breakfast administration? Thanks\"    Orders Received: OK to wait for breakfast dose    Comments: Instructed to page Dr. Krueger: paged at 5:45 AM      "

## 2024-02-14 NOTE — H&P
[facility]    History and Physical - Hospitalist Service       Date of Admission:  2/13/2024    Assessment & Plan      Coretta Kolb is a 78 year old female admitted on 2/13/2024. She presents to the emergency department as her ophthalmology team was concerned with some increased confusion, as well as daughter.  Found to have uncontrolled blood glucose as well as possible urinary tract infection.  Daughter and family with some more longstanding concern about patient's safety to continue to live independently with her medication issues.    Uncontrolled type 2 diabetes with neuropathy, retinopathy: Largely secondary to medication nonadherence.  Estimates that she misses her insulin twice per week.  Follows with endocrine through [facility].  Hemoglobin A1c has been increasing on outpatient follow-up; improved to 7.9 during her stay at [facility] and subsequent assisted living facility, now greater than 11.  -Resume prior to admission insulin regimen including Lantus 32 units every morning, Humalog 75/25 50 units in the morning, 60 units with dinner.  -Given patient's expressed financial concerns resulting in skipping insulin doses at times, recommend close follow-up with endocrine and consideration of oral hypoglycemic agents as attempting to wean from Lantus, which is likely the most expensive aspect of her diabetes regimen.  Discussed this with patient's daughter at bedside as well.  Not starting oral hypoglycemic agents during hospitalization given observation admission.  Patient reports inability to take metformin, but cannot tell me why this was the case (side effects or related to renal function in the past).   -Consider switch from 75/25 to 70/30 insulin if any significant cost benefit; will defer to endocrine clinic.  75/25 or 70/30 insulin may need to be increased with dose reduction of Lantus as well for cost savings.  -Hemoglobin A1c pending  -Medium dose sliding scale  insulin 4 times daily before meals and at bedtime  -Resume prior to admission  -Continue gabapentin 900 mg twice daily    Urinary tract infection: Patient with urinary frequency, pyuria on urinalysis.  Symptoms and findings could potentially be secondary to uncontrolled diabetes as well.  -Urine culture pending  -Continue ceftriaxone 1 g every 24 hours    Hypothyroidism:  -Resume levothyroxine 100 mcg daily  -Add on TSH; if significantly elevated, might suggest more issues with medication adherence at home.    Metabolic encephalopathy: Patient with some increased confusion noted by her ophthalmology team earlier today.  Daughter reports that she has had concerns about confusion in the past weeks as well.  Potentially related to uncontrolled diabetes or urinary tract infection.  Given prior concern for patient's ability to safely live independently even 6 months ago before she signed out of her care facility and now medication nonadherence with increasing A1c, suspect this may be a more long-term issue.  Daughter feels the same.  1 fall reported at the end of summer since leaving care facility 6 months ago.  -Care management consultation for disposition planning  -Occupational Therapy consulted for cognitive assessment and disposition planning.  -Encouraged patient to have her daughter, Janet, present at future appointments; she tells me that her daughter would be her healthcare decision-maker if she were unable to make decisions on her own  -Encouraged patient to be open to recommendations by providers and concerns expressed by family.  Suspect she may have some underlying neurocognitive impairment    Depression:  -Resume prior to admission Cymbalta when reconciled by pharmacy    Hypertension:  -Resume prior to admission lisinopril 30 mg daily.            Diet:  Regular adult diet  DVT Prophylaxis: Pneumatic Compression Devices  Rodriguez Catheter: Not present  Lines: None     Cardiac Monitoring: None  Code Status:   "Full code.  Confirmed with patient on admission    Clinically Significant Risk Factors Present on Admission                  # Hypertension: Home medication list includes antihypertensive(s)      # Severe Obesity: Estimated body mass index is 41.57 kg/m  as calculated from the following:    Height as of this encounter: 1.549 m (5' 1\").    Weight as of this encounter: 99.8 kg (220 lb).              Disposition Plan      Expected Discharge Date: 02/15/2024                  Obi Krueger MD  Hospitalist Service  [facility]  Securely message with [employer] (more info)  Text page via [facility] Paging/Directory     ______________________________________________________________________    Chief Complaint   Increased confusion, uncontrolled diabetes, urinary frequency    History is obtained from the patient, chart review, discussion with Dr. Buckner in the emergency department, patient's daughter at bedside, review of outside records including recent telephone Pharm.D. follow-up with increasing hemoglobin A1c and patient herself expressing a concern that she is not taking care of herself appropriately.    History of Present Illness   Coretta Kolb is a 78 year old female who presents to the emergency department for evaluation of increased confusion.  Patient was at her ophthalmology appointment for retinal injection and her ophthalmology provider was concerned that she was not thinking clearly to baseline provider was used to.  Was recommended not to drive and to have further evaluation in the emergency department.    In the emergency department, patient found to be hyperglycemic greater than 300, increasing to greater than 500 after meal was provided.  Patient complained of urinary frequency and was found to have pyuria potentially representing urinary tract infection.    Last spring/summer, patient was hospitalized for a left leg injury and subsequently went to TCU.  Transition from TCU to a care " "facility in [place], but as she improved with physical therapy, signed out after approximately 6 months and returned home to live independently.  Family was opposed to her returning to independent living at home.  Patient describes a strained relationship with her daughter after this event.    During her TCU stay and care facility stay with medication assistance, patient's hemoglobin A1c improved from the 9 range down to 7.9.  Since leaving TCU, her A1c is now greater than 11.  She tells me that she last took insulin on Monday, and during a Pharm.D. visit 2/6/2024 over the telephone, had a concern that she was \"not taking care of [herself].\"  She estimates that she is missing her insulin doses twice per week, but Pharm.D. note mentions sporadic refills historically as well.  Patient believes she is taking her oral medications more regularly than her insulin.  Is not wearing her continuous glucose monitor.  She describes frustration with cost of her medications and spending more time at [facility] than at home with her multiple medications and insulin.    Has not had a fall recently, last fall was on the steps of her porch late summer and she was assisted up by family without further evaluation.  No longer using a walker or cane and does not feel she had difficulties with ambulation.    Daughter at bedside is concerned with patient's ability to care for herself.  She tells me that when she and her brother came over to help her fix her TV 2 weeks ago, they thought she was somewhat confused.  She does tell me that patient was awakened from sleep at that time, so confusion might have been because of this.  Patient is not grossly encephalopathic, but may be lacking some insight into her deficits.    In the emergency department, patient was offered discharge home with family assist with treatment of urinary tract infection and resumption of home insulin , but family did not feel that this was feasible for them to do.  " Observation admission requested.    Lengthy discussion with patient as well as daughter at bedside regarding concerns for her ability to care for herself as she has had difficulty managing her medications evidenced by her uncontrolled blood sugars.  I also noted that there was already concern for her caring for herself at home prior to her signing out of her care facility in [place] this past summer.  Encouraged patient to involve family with medical visits, encouraged further discussion regarding long-term goals of care.  Patient remains full code, and has a desire to remain in her home independent.  She would be open to outpatient or even in-home therapy, home health safety evaluation, and potentially medication assistance.  With her diabetes requiring insulin, medication assistance may be difficult to obtain, especially as patient is already noting difficulties with cost associated with her insulin even without administration help.      Past Medical History    Past Medical History:   Diagnosis Date    Diabetes (H)     Hypertension     Hypothyroid    Depression    Past Surgical History   Past Surgical History:   Procedure Laterality Date    ANESTHESIA OUT OF OR MRI N/A 1/11/2023    Procedure: ANESTHESIA OUT OF OR LUMBAR MRI;  Surgeon: GENERIC ANESTHESIA PROVIDER;  Location: [facility] OR    ORTHOPEDIC SURGERY         Prior to Admission Medications   Prior to Admission Medications   Prescriptions Last Dose Informant Patient Reported? Taking?   DULoxetine (CYMBALTA) 60 MG capsule  Self Yes No   Sig: Take 60 mg by mouth daily   acetaminophen (TYLENOL) 325 MG tablet   No No   Sig: Take 3 tablets (975 mg) by mouth every 8 hours   Patient taking differently: Take 975 mg by mouth every 8 hours as needed   amLODIPine (NORVASC) 10 MG tablet  Self Yes No   Sig: Take 10 mg by mouth daily   gabapentin (NEURONTIN) 300 MG capsule  Self Yes No   Sig: Take 900 mg by mouth 2 times daily   insulin glargine (LANTUS VIAL) 100 UNIT/ML soln   Self Yes No   Sig: Inject 32 Units Subcutaneous every morning   insulin lispro (HUMALOG KWIKPEN) 100 UNIT/ML (1 unit dial) KWIKPEN   No No   Sig: Inject insulin three times daily before meals and bedtime:  If Pre-meal Glucose  140 -164 give 1 unit  165 -189 give 2 units  190 -214 give 3 units  215 -239 give 4 units  240 -264 give 5 units  265 -289 give 6 units  290 -314 give 7 units  315 -339 give 8 units  340+ give 9 units    If Bedtime Glucose  200 -214 give 1 unit  215 -264 give 2 units  265 -314 give 3 units  315+ give 4 units   insulin lispro protamine-insulin lispro (HUMALOG MIX 75/25 KWIKPEN) (75-25) 100 UNIT/ML pen  Self Yes No   Sig: Inject Subcutaneous 2 times daily (before meals) 50 units with breakfast in the morning and 60 units with dinner in the evening   levothyroxine (SYNTHROID/LEVOTHROID) 100 MCG tablet  Self Yes No   Sig: Take 100 mcg by mouth daily   lisinopril (ZESTRIL) 30 MG tablet  Self Yes No   Sig: Take 30 mg by mouth daily   rosuvastatin (CRESTOR) 10 MG tablet  Self Yes No   Sig: Take 10 mg by mouth At Bedtime   terbinafine (LAMISIL) 250 MG tablet  Self Yes No   Sig: Take 250 mg by mouth Daily for one week each month   triamcinolone (KENALOG) 0.025 % external ointment  Self Yes No   Sig: Apply topically 2 times daily To lower legs      Facility-Administered Medications: None           Physical Exam   Vital Signs: Temp: 98.2  F (36.8  C) Temp src: Oral BP: (!) 175/105 Pulse: 70   Resp: 20 SpO2: 95 % O2 Device: None (Room air)    Weight: 220 lbs 0 oz    General Appearance: Obese 78-year-old female resting comfortably in bed.  She is in no acute distress at this time.  Eyes: No scleral icterus or injection   HEENT: normocephalic and atraumatic  Respiratory: Breath sounds are clear bilaterally to auscultation with no wheezes, no crackles.  Excellent effort.  No splinting.  Cardiovascular: Regular rate and rhythm.  Heart rate in the 80s  GI: Abdomen obese, soft, no significant tenderness  palpation.  No palpable mass.  Musculoskeletal: Muscular tone and bulk intact.  Appears robust for age.  Neurologic: Patient is alert and conversant.  She is not grossly encephalopathic, but does appear to lack some insight into her ability to care for self.  Very mild fine tremor of distal upper extremities  Psychiatric: Very pleasant, normal affect    Medical Decision Making       80 MINUTES SPENT BY ME on the date of service doing chart review, history, exam, documentation & further activities per the note.      Data     I have personally reviewed the following data over the past 24 hrs:    8.9  \   15.1   / 256     134 (L) 96 (L) 10.4 /  295 (H)   3.6 28 0.72 \     ALT: 12 AST: 16 AP: 147 TBILI: 0.3   ALB: 3.6 TOT PROTEIN: 7.1 LIPASE: N/A     TSH: 8.11 (H) T4: 0.88 (L) A1C: N/A       Imaging results reviewed over the past 24 hrs:   Recent Results (from the past 24 hour(s))   CT Head w/o Contrast    Narrative    EXAM: CT HEAD W/O CONTRAST  LOCATION: [facility]  DATE: 2/13/2024    INDICATION: increased confusion  COMPARISON: None.  TECHNIQUE: Routine CT Head without IV contrast. Multiplanar reformats. Dose reduction techniques were used.    FINDINGS:  INTRACRANIAL CONTENTS: No intracranial hemorrhage, extraaxial collection, or mass effect.  No CT evidence of acute infarct. Mild presumed chronic small vessel ischemic changes. Normal ventricles and sulci.     VISUALIZED ORBITS/SINUSES/MASTOIDS: No intraorbital abnormality. No paranasal sinus mucosal disease. No middle ear or mastoid effusion.    BONES/SOFT TISSUES: No acute abnormality.      Impression    IMPRESSION:  1.  No acute intracranial process.

## 2024-02-14 NOTE — PROVIDER NOTIFICATION
MD Notification    Notified Person: MD    Notified Person Name: Damon    Notification Date/Time: 02/14/24 9:33 AM     Notification Interaction: vocera message     Purpose of Notification: Patient is complaining of her eye pain. Don't know if there is something that we can do for her.     Orders Received: MD aware    Comments:

## 2024-02-14 NOTE — PHARMACY-ADMISSION MEDICATION HISTORY
Pharmacist Admission Medication History    Admission medication history is complete. The information provided in this note is only as accurate as the sources available at the time of the update.    Information Source(s): Patient, Sandra/Nereyda, and MTM visit with [facility] pharmacist on 2/6  via in-person    Pertinent Information:   Patient is a poor historian and is unable to confirm most medication doses (was able to confirm insulin regimen)  Patient follows with [facility] pharmacist, reviewed notes and appears medication adherence somewhat sporadic. This is also reflected in [facility] fill history.     Changes made to PTA medication list:  Added: furosemide  Deleted: duloxetine  Changed: rosuvastatin, levothyroxine, Humalog Mix 75/25    Allergies reviewed with patient and updates made in EHR: yes    Medication History Completed By: VANDANA ADAME [facility] 2/14/2024 8:34 AM    Prior to Admission medications    Medication Sig Last Dose Taking? Auth Provider Long Term End Date   acetaminophen (TYLENOL) 325 MG tablet Take 3 tablets (975 mg) by mouth every 8 hours  Patient taking differently: Take 975 mg by mouth every 8 hours as needed Past Week Yes Brandon Crespo MD     amLODIPine (NORVASC) 10 MG tablet Take 10 mg by mouth daily Past Month Yes Unknown, Entered By History Yes    furosemide (LASIX) 20 MG tablet Take 1 Tablet (20 mg) by mouth every Monday, Wednesday,Friday. In the morning Past Month Yes Unknown, Entered By History Yes 3/5/24   gabapentin (NEURONTIN) 300 MG capsule Take 900 mg by mouth 2 times daily Past Month Yes Unknown, Entered By History Yes    insulin glargine (LANTUS VIAL) 100 UNIT/ML soln Inject 32 Units Subcutaneous every morning Past Month Yes Reported, Patient Yes    insulin lispro protamine-insulin lispro (HUMALOG MIX 75/25 KWIKPEN) (75-25) 100 UNIT/ML pen 60 units with breakfast in the morning and 60 units with dinner in the evening Past Month Yes Unknown, Entered  By History Yes    levalbuterol (XOPENEX HFA) 45 MCG/ACT inhaler Inhale 2 puffs into the lungs every 4 hours as needed  Yes Unknown, Entered By History Yes    levothyroxine (SYNTHROID/LEVOTHROID) 112 MCG tablet Take 112 mcg by mouth daily Past Month Yes Unknown, Entered By History Yes    lisinopril (ZESTRIL) 30 MG tablet Take 30 mg by mouth daily Past Month Yes Unknown, Entered By History Yes    rosuvastatin (CRESTOR) 20 MG tablet Take 20 mg by mouth at bedtime Past Month Yes Unknown, Entered By History Yes    terbinafine (LAMISIL) 250 MG tablet Take 250 mg by mouth Daily for one week each month More than a month Yes Unknown, Entered By History     triamcinolone (KENALOG) 0.025 % external ointment Apply topically 2 times daily To lower legs Past Month Yes Unknown, Entered By History     insulin lispro (HUMALOG KWIKPEN) 100 UNIT/ML (1 unit dial) KWIKPEN Inject insulin three times daily before meals and bedtime:  If Pre-meal Glucose  140 -164 give 1 unit  165 -189 give 2 units  190 -214 give 3 units  215 -239 give 4 units  240 -264 give 5 units  265 -289 give 6 units  290 -314 give 7 units  315 -339 give 8 units  340+ give 9 units    If Bedtime Glucose  200 -214 give 1 unit  215 -264 give 2 units  265 -314 give 3 units  315+ give 4 units  Patient not taking: Reported on 2/14/2024 Not Taking  Brandon Crespo MD Yes

## 2024-02-15 ENCOUNTER — APPOINTMENT (OUTPATIENT)
Dept: OCCUPATIONAL THERAPY | Facility: CLINIC | Age: 79
DRG: 637 | End: 2024-02-15
Attending: INTERNAL MEDICINE
Payer: MEDICARE

## 2024-02-15 LAB
GLUCOSE BLDC GLUCOMTR-MCNC: 108 MG/DL (ref 70–99)
GLUCOSE BLDC GLUCOMTR-MCNC: 137 MG/DL (ref 70–99)
GLUCOSE BLDC GLUCOMTR-MCNC: 166 MG/DL (ref 70–99)
GLUCOSE BLDC GLUCOMTR-MCNC: 205 MG/DL (ref 70–99)
GLUCOSE BLDC GLUCOMTR-MCNC: 214 MG/DL (ref 70–99)
GLUCOSE BLDC GLUCOMTR-MCNC: 286 MG/DL (ref 70–99)

## 2024-02-15 PROCEDURE — 250N000013 HC RX MED GY IP 250 OP 250 PS 637: Performed by: INTERNAL MEDICINE

## 2024-02-15 PROCEDURE — 250N000013 HC RX MED GY IP 250 OP 250 PS 637: Performed by: PHYSICIAN ASSISTANT

## 2024-02-15 PROCEDURE — 250N000013 HC RX MED GY IP 250 OP 250 PS 637: Performed by: HOSPITALIST

## 2024-02-15 PROCEDURE — G0378 HOSPITAL OBSERVATION PER HR: HCPCS

## 2024-02-15 PROCEDURE — 99233 SBSQ HOSP IP/OBS HIGH 50: CPT | Performed by: PHYSICIAN ASSISTANT

## 2024-02-15 PROCEDURE — 82962 GLUCOSE BLOOD TEST: CPT

## 2024-02-15 PROCEDURE — 97165 OT EVAL LOW COMPLEX 30 MIN: CPT | Mod: GO

## 2024-02-15 PROCEDURE — 120N000001 HC R&B MED SURG/OB

## 2024-02-15 PROCEDURE — 97530 THERAPEUTIC ACTIVITIES: CPT | Mod: GO

## 2024-02-15 PROCEDURE — 97535 SELF CARE MNGMENT TRAINING: CPT | Mod: GO

## 2024-02-15 RX ADMIN — GABAPENTIN 900 MG: 300 CAPSULE ORAL at 08:26

## 2024-02-15 RX ADMIN — LISINOPRIL 30 MG: 20 TABLET ORAL at 10:24

## 2024-02-15 RX ADMIN — LEVOTHYROXINE SODIUM 112 MCG: 112 TABLET ORAL at 08:27

## 2024-02-15 RX ADMIN — AMLODIPINE BESYLATE 10 MG: 10 TABLET ORAL at 10:24

## 2024-02-15 RX ADMIN — INSULIN ASPART 60 UNITS: 100 INJECTION, SUSPENSION SUBCUTANEOUS at 17:39

## 2024-02-15 RX ADMIN — INSULIN GLARGINE 32 UNITS: 100 INJECTION, SOLUTION SUBCUTANEOUS at 08:24

## 2024-02-15 RX ADMIN — GABAPENTIN 900 MG: 300 CAPSULE ORAL at 19:30

## 2024-02-15 ASSESSMENT — ACTIVITIES OF DAILY LIVING (ADL)
ADLS_ACUITY_SCORE: 41
DEPENDENT_IADLS:: INDEPENDENT
ADLS_ACUITY_SCORE: 41

## 2024-02-15 NOTE — PROGRESS NOTES
Care Management Follow Up    Length of Stay (days): 0    Expected Discharge Date: 02/17/2024     Concerns to be Addressed: cognitive/perceptual, compliance issue, discharge planning, medication     Patient plan of care discussed at interdisciplinary rounds: Yes    Anticipated Discharge Disposition: TCU     Anticipated Discharge Services:    Anticipated Discharge DME:      Patient/family educated on Medicare website which has current facility and service quality ratings:    Education Provided on the Discharge Plan:    Patient/Family in Agreement with the Plan: yes    Referrals Placed by CM/SW:    Private pay costs discussed: Not applicable    Additional Information:  OT is recommending TCU. Spoke with daughter at bedside, pt was asleep. Discussed OT recommendation and daughter is in agreement with TCU at discharge. Daughter stated they had a negative experience with [facility] and would like to avoid this facility. She is requesting referrals to [facility], [facility], [facility], and [facility]. Daughter has list of Medicare.gov star ratings and will continue to look at more choices as needed. Daughter is aware that pt would be private pay until Medicare's requirements for coverage were met with the 3 night inpatient stay. Sent referrals.     BERENICE Jacques  Social Work  [facility]

## 2024-02-15 NOTE — PROGRESS NOTES
02/15/24 1100   Appointment Info   Signing Clinician's Name / Credentials (OT) Michaela Ross, OTR/L   Living Environment   People in Home alone   Current Living Arrangements other (see comments)  (townhouse)   Home Accessibility stairs to enter home;stairs within home   Number of Stairs, Main Entrance 2   Stair Railings, Main Entrance none   Number of Stairs, Within Home, Primary greater than 10 stairs   Stair Railings, Within Home, Primary railings safe and in good condition   Transportation Anticipated family or friend will provide   Living Environment Comments Pt lives in a [facility] alone. Has supportive daughter and granddaughter, however, unable to provide level of assistance needed. Per daughter, after being hospitalized approximately 1 year ago, pt had TCU stay and afterwards, family had pt move to [facility]. Pt chose to leave and return to [facility] following 6 month period of staying at [facility] despite family concerns. Pt has now been living independenlty in [facility]. Daughter endorsed pt not taking meds and not keeping up with cleaning which impairs pt's safety/fall risk. Pt was using a walker/cane, but stated she doesn't need it anymore.   Self-Care   Usual Activity Tolerance moderate   Current Activity Tolerance fair   Equipment Currently Used at Home cane, straight;grab bar, toilet;grab bar, tub/shower;walker, standard;shower chair;other (see comments)  (sock aide)   Fall history within last six months yes   Number of times patient has fallen within last six months 1  (High fall hx, only 1 recent fall)   Activity/Exercise/Self-Care Comment Ind with ADLs   Instrumental Activities of Daily Living (IADL)   IADL Comments Pt reportes ind with IADLs, however, daughter concerned about pt not taking medications and keeping up around the house   General Information   Onset of Illness/Injury or Date of Surgery 02/13/24   Referring Physician Krueger, Obi Tsai MD   Patient/Family Therapy Goal Statement (OT) Pt wanting to  return home, daughter hoping to have pt go to [facility].   Additional Occupational Profile Info/Pertinent History of Current Problem Coretta Kolb is a 78 year old female with history of hypertension and type 2 diabetes who presents to the ED via EMS with her daughter for evaluation of hypertension. The patient reports that she went to the eye doctor for a right eye injection and she got her right eye dilated. While at the eye clinic, her doctor noticed that the patient wasn't acting her normal self but patient denies feeling depressed or confused. Patient does note that she hasn't been eating well or taking care of her diabetes, and that she has been drinking lots of water. Patient's daughter states that the patient has been confused, slow to respond to questions, more unsteady than baseline, and stares off into space, which patient denies. Patient's daughter reports that other people have also noticed that the patient is not taking care of herself. Coretta states she is independent and was able to walk today and drove herself to her eye appointment. Daughter adds that the patient was here a year ago for a leg issue and went to transitional care after the hospital. She was then moved to an assisted living facility, but daughter reports that the patient moved herself out of the facility and back into her two-story town home. Patient endorses rhinorrhea but daughter notes that this is constant. Denies fever, chills, cough, sore throat, chest pain, trouble breathing, nausea, vomiting, abdominal pain, dysuria, hematuria, diarrhea, black or bloody stool, headache, dizziness, numbness or weakness, recent head injury, or blood thinners use.   Existing Precautions/Restrictions fall   Limitations/Impairments safety/cognitive   Cognitive Status Examination   Cognitive Status Comments Somewhat disoriented   Cognitive Screens/Assessments   Cognitive Assessments Completed [facility] Mental Status Exam ([facility]):   Total Score out of /30 13/30   SLUMS Norms 1-20 equals dementia   SLUMS Domains assessed: orientation, memory, attention, executive functions   SLUMS Interpretation Pt scored 13/30 on SLUMs cog screen, indicating probable mod-severe cog deficits. Pt scored the following: orientation (2/3 - pt stated 2004, then 2042), money management math (1/3 - impaired by short term memory), animal recall (1/3), delayed object recall (0/5), number reversals (1/2), clock (0/4), shape recognition (2/2), story recall (6/8).   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 OT having to lower pt safely to bed.   Activities of Daily Living   BADL Assessment/Intervention bathing;upper body dressing;lower body dressing;grooming;toileting   Bathing Assessment/Intervention   [place] Level (Bathing) set up;verbal cues;minimum assist (75% patient effort)   Comment, (Bathing) Per clinical judgment   Assistive Devices (Bathing) shower chair   Upper Body Dressing Assessment/Training   Comment, (Upper Body Dressing) Per clinical judgment   [place] Level (Upper Body Dressing) set up;verbal cues   Lower Body Dressing Assessment/Training   Comment, (Lower Body Dressing) Dependent for donning socks. Per daughter, pt does have a sock aide at home.   [place] Level (Lower Body Dressing) dependent (less than 25% patient effort)   Grooming Assessment/Training   Position  (Grooming) supported standing   [place] Level (Grooming) set up;minimum assist (75% patient effort)   Comment, (Grooming) Per clinical judgment   Toileting   Comment, (Toileting) Per clinical judgment   [place] Level (Toileting) moderate assist (50% patient effort);minimum assist (75% patient effort);verbal cues   Clinical Impression   Criteria for Skilled Therapeutic Interventions Met (OT) Yes, treatment indicated   OT Diagnosis Pt presents below ind baseline, impaired by decreased activity tolerance, limited ROM, poor balance, and cognition.   OT Problem List-Impairments impacting ADL problems related to;activity tolerance impaired;balance;cognition;mobility;strength;postural control;range of motion (ROM)   Assessment of Occupational Performance 5 or more Performance Deficits   Identified Performance Deficits home management, higher level IADLs (med management, driving, financial managemnet, cooking), dressing, toileting, bathing, g/h   Planned Therapy Interventions (OT) ADL retraining;IADL retraining;cognition;balance training;ROM;transfer training;strengthening;progressive activity/exercise;risk factor education   Clinical Decision Making Complexity (OT) problem focused assessment/low complexity   Risk & Benefits of therapy have been explained evaluation/treatment results reviewed;care plan/treatment goals reviewed;risks/benefits reviewed;current/potential barriers reviewed;participants voiced agreement with care plan;participants included;patient;daughter   Clinical Impression Comments Pt presents below ind baseline, impaired by decreased activity tolerance, limited ROM, poor balance, and cognition. Pt would benefit from skilled IP OT to progress level of safety and ind with I/ADLs and to rec safe discharge plan for increased safety.   OT Total Evaluation Time   OT Eval, Low Complexity Minutes (75810) 30   OT Goals   Therapy Frequency (OT) 3 times/week   OT Predicted Duration/Target Date for Goal  Attainment 02/29/24   OT Goals Hygiene/Grooming;Upper Body Dressing;Lower Body Dressing;Transfers;Toilet Transfer/Toileting;Cognition;Home Management   OT: Hygiene/Grooming modified independent;supervision/stand-by assist   OT: Upper Body Dressing Modified independent;Supervision/stand-by assist;including set-up/clothing retrieval   OT: Lower Body Dressing Modified independent;Supervision/stand-by assist;including set-up/clothing retrieval;using adaptive equipment   OT: Transfer Supervision/stand-by assist;Modified independent  (tub/shower transfer with grab bars/shower chair)   OT: Toilet Transfer/Toileting Modified independent;Supervision/stand-by assist;cleaning and garment management   OT: Home Management Modified independent;Supervision/stand-by assist;with light demand household tasks   OT: Cognitive Goal Met   Interventions   Interventions Quick Adds Self-Care/Home Management;Therapeutic Activity   Self-Care/Home Management   Self-Care/Home Mgmt/ADL, Compensatory, Meal Prep Minutes (21091) 15   Treatment Detail/Skilled Intervention Pt greeted for OT eval/treat earlier am. Pt unable to be aroused and in anticipating cog screen, OT returned when RN stated appropriate arousal level. Daughter present. Extensive time gathering pertinent information regarding daughter's hope for pt to return to snf which pt decided to leave despite family feeling pt unsafe on her own. OT administered SLUMs cog screen (per orders) to assess level of safety for I/ADLs. Score of 13/30. See detailed information above. Extensive edu provided on score interpretation and suggestion for more supportive living environment for assist with higher level IADLs. During conversation, daughter in agreement. Pt stated concerns regarding financial aspect of snf. Edu provided on recs for TCU and social work to connect with potential [facility] services. Pt EOB during conversation and observed dozing off. Verbal cues to awake pt.   Therapeutic Activities    Therapeutic Activity Minutes (15403) 10   Symptoms noted during/after treatment fatigue   Treatment Detail/Skilled Intervention OT engaged pt in functional activity via transfers and short distance ambulation to gain understanding of level of support pt requires for I/ADLs. Bed mobility with increased time/mod I. Since pt moved rooms, pt no longer had walker in room. OT inquired about use of walker, daughter hoping for one. At return with walker, cued for STS. Min A provided for 3x attempts with cues for hand placement. Pt lost balance, falling back to bed, slowly lowered by therapist and pt safe. Further prompting to increase balance. Pt safe to ambulate foward and back 2x approx 8 ft with min A - close CGA and walker.   OT Discharge Planning   OT Plan LB dressing with AE (reacher/sock aide - pt has sock aide at home), progress ambulation to bathroom for toilet transfer and g/h at sink.   OT Discharge Recommendation (DC Rec) Transitional Care Facility   OT Rationale for DC Rec Pt below baseline. SLUMs 13/30 and pt with severe balance deficits and poor activity tolerance. Daughter concerned about pt's safety. TCU rec to gain greater ind with I/ADLs. Anticipate pt will require [facility], as family unable to provide 24/7 assistance pt is currently requiring. Will cont to assess.   OT Brief overview of current status SLUMs 13/30, Min A - Close CGA/walker short distance. LOB.   OT Equipment Needed at Discharge other (see comments)  (all AE needs met)   Total Session Time   Timed Code Treatment Minutes 25   Total Session Time (sum of timed and untimed services) 55

## 2024-02-15 NOTE — UTILIZATION REVIEW
Admission Status; Secondary Review Determination    Under the authority of the Utilization Management Committee, the utilization review process indicated a secondary review on the above patient. The review outcome is based on review of the medical records, discussions with staff, and applying clinical experience noted on the date of the review.    (x) Inpatient Status Appropriate - This patient's medical care is consistent with medical management for inpatient care and reasonable inpatient medical practice.    RATIONALE FOR DETERMINATION: 78-year-old female with history of hypertension, mild cognitive impairment, type 2 diabetes who was seeing her eye doctor and noted the patient was not acting like her self, appearing confused.  Patient also had urinary frequency, mild cough, history of diabetic retinopathy, depression, hypothyroidism and obesity.  It became known that patient had independently stopped taking her diabetic medications and was found to have significant hyperglycemia, and moderately uncontrolled hypertension with apparent metabolic encephalopathy on top of patient's underlying cognitive impairment.  Patient is required greater than 2 nights in the hospital for medical management including empiric IV antibiotics and blood pressure control while monitoring patient's mental status as well as noting patient has new significant severe balance difficulties associated with this illness.  Patient unsafe for discharge prior to [facility] hospital stay appropriate for inpatient management.    At the time of admission with the information available to the attending physician more than 2 nights Hospital complex care was anticipated, based on patient risk of adverse outcome if treated as outpatient and complex care required. Inpatient admission is appropriate based on the Medicare guidelines.    This document was produced using voice recognition software    The information on this document is developed by the  utilization review team in order for the business office to ensure compliance. This only denotes the appropriateness of proper admission status and does not reflect the quality of care rendered.    The definitions of Inpatient Status and Observation Status used in making the determination above are those provided in the CMS Coverage Manual, Chapter 1 and Chapter 6, section 70.4.    Sincerely,    Mina Machado MD  Utilization Review  Physician Advisor  [facility].

## 2024-02-15 NOTE — PLAN OF CARE
SUMMARY NOTE:  Orientation/Cognitive: Oriented to self. Pt seemed more confused and impulsive this shift. Pt later on fell asleep. MD notified. No new orders  Observation Goals (Met/ Not Met): No  Mobility Level/Assist Equipment: A2/GB/FW  Antibiotics & Plan (IV/po, length of tx left): Rocephin   Pain Management: Denies pain  Tele/VS/O2: VSS on RA  ABNL Lab/BG: BG -205 @ 2 am  Diet: Reg  Bowel/Bladder: Incontinence at times - pure wick this shift  Skin Concerns: Mirza/redness/scaling on [place] shins. Blanchable redness to coccyx  Drains/Devices: PIV L FA and R AC  Patient Stated Goal for Today: Monitor BG and treat UTI.

## 2024-02-15 NOTE — PROGRESS NOTES
"PRIMARY DIAGNOSIS: \"GENERIC\" NURSING  OUTPATIENT/OBSERVATION GOALS TO BE MET BEFORE DISCHARGE:  ADLs back to baseline: No    Activity and level of assistance: Up with maximum assistance overnight (Ax2), normally independent.  Plan CM/SW and OT evaluation.     Pain status: Improved-controlled with oral pain medications. (Baseline gabapentin this AM; neuropathy)    Return to near baseline physical activity: No     Discharge Planner Nurse   Safe discharge environment identified: No  Barriers to discharge: Yes--safe dispo plan / TCU needed        Entered by: Cindy Ventura RN 02/15/2024 1:54 PM     Please review provider order for any additional goals.   Nurse to notify provider when observation goals have been met and patient is ready for discharge.  "

## 2024-02-15 NOTE — PROGRESS NOTES
02/15/24 1100   Appointment Info   Signing Clinician's Name / Credentials (OT) Michaela Vandana, OTR/L   Quick Adds   Quick Adds Certification   Living Environment   People in Home alone   Current Living Ar[place]  [place]   Home Accessibility stairs to enter home;stairs within home   Number of Stairs, Main Entrance 2   Stair Railings, Main Entrance none   Number of Stairs, Within Home, Primary greater than 10 stairs   Stair Railings, Within Home, Primary railings safe and in good condition   Transportation Anticipated family or friend will provide   [place] Environment [place] Has supportive daughter and granddaughter, however, unable to provide level of assistance needed. Per daughter, after being hospitalized approximately 1 year ago, pt had TCU stay and afterwards, family had pt move to [facility]. Pt chose to leave and return to [facility] following 6 month period of staying at [facility] despite family concerns. Pt has now been living independenlty in [facility]. Daughter endorsed pt not taking meds and not keeping up with cleaning which impairs pt's safety/fall risk. Pt was using a walker/cane, but stated she doesn't need it anymore.   Self-Care   Usual Activity Tolerance moderate   Current Activity Tolerance fair   Equipment Currently Used at Home cane, straight;grab bar, toilet;grab bar, tub/shower;walker, standard;shower chair;other (see comments)  (sock aide)   Fall history within last six months yes   Number of times patient has fallen within last six months 1  (High fall hx, only 1 recent fall)   Activity/Exercise/Self-Care Comment Ind with ADLs   Instrumental Activities of Daily Living (IADL)   IADL Comments Pt reportes ind with IADLs, however, daughter concerned about pt not taking medications and keeping up around the house   General Information   Onset of Illness/Injury or Date of Surgery 02/13/24   Referring Physician Krueger, Obi Tsai MD   Patient/Family  Therapy Goal Statement (OT) Pt wanting to return home, daughter hoping to have pt go to [facility].   Additional Occupational Profile Info/Pertinent History of Current Problem Coretta Kolb is a 78 year old female with history of hypertension and type 2 diabetes who presents to the ED via EMS with her daughter for evaluation of hypertension. The patient reports that she went to the eye doctor for a right eye injection and she got her right eye dilated. While at the eye clinic, her doctor noticed that the patient wasn't acting her normal self but patient denies feeling depressed or confused. Patient does note that she hasn't been eating well or taking care of her diabetes, and that she has been drinking lots of water. Patient's daughter states that the patient has been confused, slow to respond to questions, more unsteady than baseline, and stares off into space, which patient denies. Patient's daughter reports that other people have also noticed that the patient is not taking care of herself. Coretta states she is independent and was able to walk today and drove herself to her eye appointment. Daughter adds that the patient was here a year ago for a leg issue and went to transitional care after the hospital. She was then moved to an assisted living facility, but daughter reports that the patient moved herself out of the facility and back into her two-story town home. Patient endorses rhinorrhea but daughter notes that this is constant. Denies fever, chills, cough, sore throat, chest pain, trouble breathing, nausea, vomiting, abdominal pain, dysuria, hematuria, diarrhea, black or bloody stool, headache, dizziness, numbness or weakness, recent head injury, or blood thinners use.   Existing Precautions/Restrictions fall   Limitations/Impairments safety/cognitive   Cognitive Status Examination   Cognitive Status Comments Somewhat disoriented   Cognitive Screens/Assessments   Cognitive Assessments Completed [facility] Mental Status Exam (SLUMS):  Total Score out of /30 13/30   SLUMS Norms 1-20 equals dementia   SLUMS Domains assessed: orientation, memory, attention, executive functions   SLUMS Interpretation Pt scored 13/30 on SLUMs cog screen, indicating probable mod-severe cog deficits. Pt scored the following: orientation (2/3 - pt stated 2004, then 2042), money management math (1/3 - impaired by short term memory), animal recall (1/3), delayed object recall (0/5), number reversals (1/2), clock (0/4), shape recognition (2/2), story recall (6/8).   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 OT having to lower pt safely to bed.   Activities of Daily Living   BADL Assessment/Intervention bathing;upper body dressing;lower body dressing;grooming;toileting   Bathing Assessment/Intervention   [place] Level (Bathing) set up;verbal cues;minimum assist (75% patient effort)   Comment, (Bathing) Per clinical judgment   Assistive Devices (Bathing) shower chair   Upper Body Dressing Assessment/Training   Comment, (Upper Body Dressing) Per clinical judgment   [place] Level (Upper Body Dressing) set up;verbal cues   Lower Body Dressing Assessment/Training   Comment, (Lower Body Dressing) Dependent for donning socks. Per daughter, pt does have a sock aide at home.   [place] Level (Lower Body Dressing) dependent (less than 25% patient effort)   Grooming  Assessment/Training   Position (Grooming) supported standing   [place] Level (Grooming) set up;minimum assist (75% patient effort)   Comment, (Grooming) Per clinical judgment   Toileting   Comment, (Toileting) Per clinical judgment   [place] Level (Toileting) moderate assist (50% patient effort);minimum assist (75% patient effort);verbal cues   Clinical Impression   Criteria for Skilled Therapeutic Interventions Met (OT) Yes, treatment indicated   OT Diagnosis Pt presents below ind baseline, impaired by decreased activity tolerance, limited ROM, poor balance, and cognition.   OT Problem List-Impairments impacting ADL problems related to;activity tolerance impaired;balance;cognition;mobility;strength;postural control;range of motion (ROM)   Assessment of Occupational Performance 5 or more Performance Deficits   Identified Performance Deficits home management, higher level IADLs (med management, driving, financial managemnet, cooking), dressing, toileting, bathing, g/h   Planned Therapy Interventions (OT) ADL retraining;IADL retraining;cognition;balance training;ROM;transfer training;strengthening;progressive activity/exercise;risk factor education   Clinical Decision Making Complexity (OT) problem focused assessment/low complexity   Risk & Benefits of therapy have been explained evaluation/treatment results reviewed;care plan/treatment goals reviewed;risks/benefits reviewed;current/potential barriers reviewed;participants voiced agreement with care plan;participants included;patient;daughter   Clinical Impression Comments Pt presents below ind baseline, impaired by decreased activity tolerance, limited ROM, poor balance, and cognition. Pt would benefit from skilled IP OT to progress level of safety and ind with I/ADLs and to rec safe discharge plan for increased safety.   OT Total Evaluation Time   OT Eval, Low Complexity Minutes (91582) 30   Therapy Certification   Start of Care Date 02/15/24    Certification date from 02/15/24   Certification date to 02/29/24   OT Goals   Therapy Frequency (OT) 3 times/week   OT Predicted Duration/Target Date for Goal Attainment 02/29/24   OT Goals Hygiene/Grooming;Upper Body Dressing;Lower Body Dressing;Transfers;Toilet Transfer/Toileting;Cognition;Home Management   OT: Hygiene/Grooming modified independent;supervision/stand-by assist   OT: Upper Body Dressing Modified independent;Supervision/stand-by assist;including set-up/clothing retrieval   OT: Lower Body Dressing Modified independent;Supervision/stand-by assist;including set-up/clothing retrieval;using adaptive equipment   OT: Transfer Supervision/stand-by assist;Modified independent  (tub/shower transfer with grab bars/shower chair)   OT: Toilet Transfer/Toileting Modified independent;Supervision/stand-by assist;cleaning and garment management   OT: Home Management Modified independent;Supervision/stand-by assist;with light demand household tasks   OT: Cognitive Goal Met   Interventions   Interventions Quick Adds Self-Care/Home Management;Therapeutic Activity   Self-Care/Home Management   Self-Care/Home Mgmt/ADL, Compensatory, Meal Prep Minutes (61061) 15   Treatment Detail/Skilled Intervention Pt greeted for OT eval/treat earlier am. Pt unable to be aroused and in anticipating cog screen, OT returned when RN stated appropriate arousal level. Daughter present. Extensive time gathering pertinent information regarding daughter's hope for pt to return to [facility] which pt decided to leave despite family feeling pt unsafe on her own. OT administered SLUMs cog screen (per orders) to assess level of safety for I/ADLs. Score of 13/30. See detailed information above. Extensive edu provided on score interpretation and suggestion for more supportive living environment for assist with higher level IADLs. During conversation, daughter in agreement. Pt stated concerns regarding financial aspect of [facility]. Edu provided on recs for TCU  and social work to connect with potential group home services. Pt EOB during conversation and observed dozing off. Verbal cues to awake pt.   Therapeutic Activities   Therapeutic Activity Minutes (50899) 10   Symptoms noted during/after treatment fatigue   Treatment Detail/Skilled Intervention OT engaged pt in functional activity via transfers and short distance ambulation to gain understanding of level of support pt requires for I/ADLs. Bed mobility with increased time/mod I. Since pt moved rooms, pt no longer had walker in room. OT inquired about use of walker, daughter hoping for one. At return with walker, cued for STS. Min A provided for 3x attempts with cues for hand placement. Pt lost balance, falling back to bed, slowly lowered by therapist and pt safe. Further prompting to increase balance. Pt safe to ambulate foward and back 2x approx 8 ft with min A - close CGA and walker.   OT Discharge Planning   OT Plan LB dressing with AE (reacher/sock aide - pt has sock aide at home), progress ambulation to bathroom for toilet transfer and g/h at sink.   OT Discharge Recommendation (DC Rec) Transitional Care Facility   OT Rationale for DC Rec Pt below baseline. SLUMs 13/30 and pt with severe balance deficits and poor activity tolerance. Daughter concerned about pt's safety. TCU rec to gain greater ind with I/ADLs. Anticipate pt will require group home, as family unable to provide 24/7 assistance pt is currently requiring. Will cont to assess.   OT Brief overview of current status SLUMs 13/30, Min A - Close CGA/walker short distance. LOB.   OT Equipment Needed at Discharge other (see comments)  (all AE needs met)   Total Session Time   Timed Code Treatment Minutes 25   Total Session Time (sum of timed and untimed services) 55      [facility]  OUTPATIENT OCCUPATIONAL THERAPY  EVALUATION  PLAN OF TREATMENT FOR OUTPATIENT REHABILITATION  (COMPLETE FOR INITIAL CLAIMS ONLY)  Patient's Last Name, First  Name, M.I.  YOB: 1945  KennediCoretta  JAYLA                          Provider's Name  [facility] Medical Record No.  [id number]                             Onset Date:  02/13/24   Start of Care Date:  02/15/24   Type:     ___PT   _X_OT   ___SLP Medical Diagnosis:                       OT Diagnosis:  Pt presents below ind baseline, impaired by decreased activity tolerance, limited ROM, poor balance, and cognition. Visits from SOC:  1     See note for plan of treatment, functional goals and certification details    I CERTIFY THE NEED FOR THESE SERVICES FURNISHED UNDER        THIS PLAN OF TREATMENT AND WHILE UNDER MY CARE     (Physician co-signature of this document indicates review and certification of the therapy plan).

## 2024-02-15 NOTE — PLAN OF CARE
Goal Outcome Evaluation:      Plan of Care Reviewed With: patient, child (daughter)    Overall Patient Progress: improvingOverall Patient Progress: improving    Outcome Evaluation: progressing    PRIMARY Concern: confusion, unsteady   SAFETY RISK Concerns: High falls risk  Isolation/Type: None  Tests/Procedures: none  Consults? OT, CM/SW   Where is patient from? Home - noncompliant with meds at home   Other Important info for NEXT shift: encourage ambulation to bathroom  Anticipated DC date & active delays: 2/16 recommended to TCU, will be self-pay per CM-SW  _____________________________________________________________________________  SUMMARY NOTE:  Orientation/Cognitive: oriented to self/place, consistently disoriented to time, disoriented to or poor insight into situation; labile level of alertness--can be alert/interactive/participating and but will suddenly lie down in bed, snore, and be unable to assist with turn/repositioning     Observation Goals (Met/ Not Met): Not met   Mobility Level/Assist Equipment: varies/labile--at times assist x1 gait belt walker, or SBA/cues to turn/repo; other times Ax2 to turn & repo  Antibiotics & Plan: IV abx discontinued  Pain Management: scheduled gabapentin  Tele/VS/O2: VSS on RA, slightly soft BP's this AM  ABNL Lab/BG:  this AM, 106 this noon  Diet: Mod CHO, low fat, low sodium; needs help ordering   Bowel/Bladder: ambulate to bathroom during day; may benefit from purewick overnight   Skin Concerns: [place]/redness/scaling of LE's   Drains/Devices: PIV L FA and R AC  Patient Stated Goal for Today: Monitor BG, work with CM team for discharge planning

## 2024-02-15 NOTE — PROGRESS NOTES
[facility]  Hospitalist Progress Note    Assessment & Plan   Coretta Kolb is a 78 year old female who was admitted on 2/13/2024.     Past medical history significant for HTN, HLP, DM2, Diabetic Neuropathy, Diabetic retinopathy, Obesity, Hypothyroidism, MDD with anxiety, Peripheral edema, Venous insufficiency who was registered to short-stay/observation due to hyperglycemia, pyuria, hypertension and encephalopathy.      Patient presented to the ED as her ophthalmology team was concerned with some increased confusion, as well as daughter.  Work-up in the ED revealed that patient's blood glucose was uncontrolled (glucose of 351 and on recheck at 508).  UA was grossly abnormal and concerning for UTI.  BP was elevated at 193/83.  Patient's daughter and family with some more longstanding concern about patient's safety to continue to live independently with her medication issues.    Reviewed ED notes and admission H&P.      Uncontrolled type 2 diabetes with neuropathy, retinopathy  Hyperglycemia  *Largely secondary to medication nonadherence.  Estimates that she misses her insulin twice per week.  Follows with endocrine through [facility].  Hemoglobin A1c has been increasing on outpatient follow-up; improved to 7.9 during her stay at [facility] and subsequent assisted living facility, now greater than 11.4%  *Patient expressed financial concerns resulting in skipped insulin doses at times.    -Resumed on PTA insulin regimen including Lantus 32 units every morning, Humalog 75/25 (autosubbed to 70/30) 50 units in the morning, 60 units with dinner.  -Consider switch from 75/25 to 70/30 insulin if any significant cost benefit; will defer to endocrine clinic.  75/25 or 70/30 insulin may need to be increased with dose reduction of Lantus as well for cost savings.  -Recommend close follow-up with endocrine and consideration of oral hypoglycemic agents as attempting to wean from Lantus, which is  likely the most expensive aspect of her diabetes regimen.    --Admitting Hospitalist discussed this with patient's daughter at bedside as well.    --Not starting oral hypoglycemic agents during hospitalization given observation admission.    --Patient reports inability to take metformin, but cannot state why this was the case (side effects or related to renal function in the past).   -Medium dose sliding scale insulin 4 times daily before meals and at bedtime.  -Glucose checks QID.    -Hypoglycemic protocol in place.    -Continue gabapentin 900 mg BID.      Metabolic encephalopathy  *Patient with some increased confusion noted by her ophthalmology team earlier on day of presentation.  Daughter had also reported that she has had concerns about confusion in the past few weeks as well.  Potentially related to uncontrolled diabetes or urinary tract infection.  Given prior concern for patient's ability to safely live independently even 6 months ago before she signed out of her care facility and now medication nonadherence with increasing A1c, suspect this may be a more long-term issue.  Daughter feels the same.  1 fall reported at the end of summer since leaving care facility 6 months ago.  -Care management consultation for disposition planning.  -Occupational Therapy consulted for cognitive assessment and disposition planning.   --SLUMS score of 13/30 from 2/15.  --Noted to be below baseline.  Significant balance deficits and poor activity tolerance.    --Recommending TCU.    --Likely benefit from a Neuropsych referral at discharge.    -Encouraged patient to have her daughter, Janet, present at future appointments; she indicated that her daughter would be her healthcare decision-maker if she were unable to make decisions on her own.  -Encouraged patient to be open to recommendations by providers and concerns expressed by family.  Suspect she may have some underlying neurocognitive impairment.      Pyuria  *Patient  "with urinary frequency, pyuria on urinalysis.  Symptoms and findings could potentially be secondary to uncontrolled diabetes as well.  *Urine culture with mixed urogenital luisa.     --Reviewed results with patient and her daughter.    -Stop ceftriaxone 2/15.      Hypothyroidism  -Resumed on PTA levothyroxine 100 mcg/d.    -Add on TSH; if significantly elevated, might suggest more issues with medication adherence at home.   --TSH elevated at 8.11 with low Free T4 of 0.88.      Depression  *Per Pharmacy med rec, patient no longer taking Cymbalta.      Hypotension  Hypertension  *AM vitals on 2/15 with hypotension BP of 93/46.    -Resumed on PTA amlodipine 10 mg/d and lisinopril 30 mg/d.  Hold parameters in place 2/15.      HLP  -Hold PTA Crestor 20 mg/d.  Resume at discharge.      Peripheral edema  Venous insufficiency   - Hold PTA lasix 20 mg every M/W/F and resume at discharge.      Obesity   Body mass index is 41.57 kg/m .  Increase in all-cause morbidity and mortality.   - Follow up with PCP regarding ongoing management.        Clinically Significant Risk Factors Present on Admission                  # Hypertension: Home medication list includes antihypertensive(s)      # Severe Obesity: Estimated body mass index is 41.57 kg/m  as calculated from the following:    Height as of this encounter: 1.549 m (5' 1\").    Weight as of this encounter: 99.8 kg (220 lb).                Diet: Combination Diet Moderate Consistent Carb (60 g CHO per Meal) Diet; Low Saturated Fat Na <2400mg Diet     DVT Prophylaxis: Pneumatic Compression Devices   Rodriguez Catheter: Not present  Lines: None     Cardiac Monitoring: None  Code Status: Full Code      Observation Goals: -diagnostic tests and consults completed and resulted, -vital signs normal or at patient baseline, -tolerating oral intake to maintain hydration, -returns to baseline functional status, -safe disposition plan has been identified, Nurse to notify provider when observation " "goals have been met and patient is ready for discharge.     Disposition Plan      Expected Discharge Date: 02/15/2024        Discharge Comments: UTI+   IV abx.   UC pending.      Entered: Jersey Tyler PA-C 02/15/2024, 7:57 AM        The patient's care was discussed with the Bedside Nurse, Care Coordinator/, Patient, Patient's Family, and Charge Nurse .      The patient has been discussed with Dr. Mendez, who agrees with the assessment and plan at this time.    Jersey Tyler PA-C  [facility]  Securely message with the Vocera Web Console (learn more here)  Text page via [facility] Paging/Directory      Interval History   Patient was resting in bed upon arrival.  Her daughter and bedside nurse present.  Patient stated she was having difficulty breathing and asked for some water.  She denied fever, chills, chest pain or abdominal pain.  She stated she had a headache yesterday but not now.  She mentioned she had a cough previously for ~ 3 weeks that has resolved.  She also mentioned having diarrhea in the past few weeks but none now.      She answer orientation questions appropriately except for the year (answered 2022).  We spoke about what meds she has not been taking and she listed several off.  We reviewed what can occur when not taking these medications.  When asked why she said cost of the medications.  She then said she can afford the medications but doesn't want to spend the money and as such stopped taking them.  She was unsure how long it had been since stopping medications.      We reviewed OT recommendations for TCU.        -Data reviewed today: I reviewed all new labs and imaging results over the last 24 hours. I personally reviewed no images or EKG's today.    Physical Exam   /50 (BP Location: Left arm)   Pulse 72   Temp 97.8  F (36.6  C) (Oral)   Resp 20   Ht 1.549 m (5' 1\")   Wt 99.8 kg (220 lb)   SpO2 97%   BMI 41.57 kg/m  "     Constitutional: Awake, alert, cooperative, no apparent distress.    ENT: Normocephalic, without obvious abnormality, atraumatic, oral pharynx with dry mucus membranes, tonsils without erythema or exudates.  Neck: Supple, symmetrical, trachea midline, no adenopathy.  Pulmonary: No increased work of breathing, fair air exchange, clear to auscultation bilaterally, no crackles or wheezing.  Cardiovascular: Regular rate and rhythm, normal S1 and S2, no S3 or S4, and no murmur noted.  GI: Normal bowel sounds, soft, non-distended, non-tender. Obese.    Skin/Integumen: Venous stasis/dermatitis noted on the shins laterally.  Otherwise visualized skin appeared clear.  Neuro: CN II-XII grossly intact.  Psych:  Alert and oriented x 3. Normal affect.  Extremities: Lower extremity edema noted, and calves are non-TTP bilaterally.       Medical Decision Making       Please see A&P for additional details of medical decision making.  Greater than 50 MINUTES SPENT BY ME on the date of service doing chart review, history, exam, documentation & further activities per the note.         Medications      amLODIPine  10 mg Oral Daily    gabapentin  900 mg Oral BID    insulin aspart  1-7 Units Subcutaneous TID AC    insulin aspart  1-5 Units Subcutaneous At Bedtime    insulin aspart prot & aspart  60 Units Subcutaneous Daily with supper    insulin glargine  32 Units Subcutaneous QAM    levothyroxine  112 mcg Oral Daily    lisinopril  30 mg Oral Daily    sodium chloride (PF)  3 mL Intracatheter Q8H       Data   Recent Labs   Lab 02/15/24  1205 02/15/24  0809 02/15/24  0545 02/14/24  0756 02/14/24  0627 02/13/24 2018 02/13/24 2012   WBC  --   --   --   --  9.7  --  8.9   HGB  --   --   --   --  14.0  --  15.1   MCV  --   --   --   --  87  --  87   PLT  --   --   --   --  143*  --  256   NA  --   --   --   --  135  --  134*   POTASSIUM  --   --   --   --  4.5  --  3.6   CHLORIDE  --   --   --   --  100  --  96*   CO2  --   --   --   --   24  --  28   BUN  --   --   --   --  11.5  --  10.4   CR  --   --   --   --  0.68  --  0.72   ANIONGAP  --   --   --   --  11  --  10   KATERIN  --   --   --   --  9.3  --  9.5   * 166* 214*   < > 294*   < > 431*   ALBUMIN  --   --   --   --   --   --  3.6   PROTTOTAL  --   --   --   --   --   --  7.1   BILITOTAL  --   --   --   --   --   --  0.3   ALKPHOS  --   --   --   --   --   --  147   ALT  --   --   --   --   --   --  12   AST  --   --   --   --   --   --  16    < > = values in this interval not displayed.       No results found for this or any previous visit (from the past 24 hour(s)).

## 2024-02-15 NOTE — PROGRESS NOTES
"PRIMARY DIAGNOSIS: \"GENERIC\" NURSING  OUTPATIENT/OBSERVATION GOALS TO BE MET BEFORE DISCHARGE:  ADLs back to baseline: No    Activity and level of assistance: Up with maximum assistance overnight (Ax2), normally independent.  Plan CM/SW and OT evaluation.     Pain status: Improved-controlled with oral pain medications. (Baseline gabapentin this AM; 1 dose Ultram yesterday 2/14)    Return to near baseline physical activity: No     Discharge Planner Nurse   Safe discharge environment identified: No  Barriers to discharge: Yes--safe dispo plan       Entered by: Cindy Ventura RN 02/15/2024 9:15 AM     Please review provider order for any additional goals.   Nurse to notify provider when observation goals have been met and patient is ready for discharge.  "

## 2024-02-15 NOTE — CONSULTS
Care Management Initial Consult    General Information  Assessment completed with: Patient, Children, Jeaneth and daughterJanet  Type of CM/SW Visit: Initial Assessment    Primary Care Provider verified and updated as needed: Yes   Readmission within the last 30 days: no previous admission in last 30 days      Reason for Consult: discharge planning  Advance Care Planning: Advance Care Planning Reviewed: no concerns identified          Communication Assessment  Patient's communication style: spoken language (English or Bilingual)    Hearing Difficulty or Deaf: no        Cognitive  Cognitive/Neuro/Behavioral: WDL  Level of Consciousness:  (sleeping but responsive to voice)  Arousal Level: opens eyes spontaneously  Orientation: person, situation, place     Best Language: 0 - No aphasia  Speech: clear    Living Environment:   People in home: alone     Current living Arrangements: house      Able to return to prior arrangements:         Family/Social Support:  Care provided by: self  Provides care for: no one  Marital Status: Single  Children          Description of Support System: Supportive, Involved         Current Resources:   Patient receiving home care services: No     Community Resources: None  Equipment currently used at home:    Supplies currently used at home: Diabetic Supplies    Employment/Financial:  Employment Status: retired        Financial Concerns: other (see comments) (pt denies issues affording medication but daughter states pt told the doctor in ED she doesn't take her insulin everyday due to cost)           Does the patient's insurance plan have a 3 day qualifying hospital stay waiver?  No    Lifestyle & Psychosocial Needs:  Social Determinants of Health     Food Insecurity: Not on file   Depression: Not on file   Housing Stability: Not on file   Tobacco Use: Unknown (2/15/2023)    Patient History     Smoking Tobacco Use: Never     Smokeless Tobacco Use: Unknown     Passive Exposure: Never  "  Financial Resource Strain: Not on file   Alcohol Use: Not on file   Transportation Needs: Not on file   Physical Activity: Not on file   Interpersonal Safety: Not on file   Stress: Not on file   Social Connections: Not on file       Functional Status:  Prior to admission patient needed assistance:   Dependent ADLs:: Independent  Dependent IADLs:: Independent       Mental Health Status:  Mental Health Status: No Current Concerns       Chemical Dependency Status:  Chemical Dependency Status: No Current Concerns             Values/Beliefs:  Spiritual, Cultural Beliefs, Hinduism Practices, Values that affect care: no               Additional Information:  Consult for discharge planning.  Met with patient and her daughter, Janet and introduced self and role. Pt shared she lives in her own home and is independent- drives, does own cooking, laundry, medications, etc.  Pt states she has access to a cane and walker but does not use any assistive device.  Pt shared she has grab bars and a shower chair in her walk in shower.  There are stairs to the bedroom and bathroom, laundry is on this level also.    Verified pt primary, Myra Tesfaye.  Pt shared she has an endocrinologist through [facility] and has an appointment next month, states she see's them every 3 months.  Pt states she has a glucometer and but does not check her blood sugars everyday, when asked why she stated, \"I don't know.  I just don't\"  Verified with pt that machine is working and she stated that it is.  Pt daughter voiced concern regarding pt not checking her blood sugar and not giving herself her insulin daily and that pt had said cost was an issue with this when in ED.  Pt denied having difficulty paying for insulin when asked by CC.      Discussed possibility of home care at discharge.  Pt stated she has never had home care and did not think she would need this.  Pt daughter voiced concern and would like pt to consider home care if recommended.  " OT to evaluate pt and CC to follow up with pt/Janet this afternoon to discuss possibility of home care further based upon OT recommendation.    Rosemarie High RN, BS  Care Coordinator  [email or web address]

## 2024-02-15 NOTE — PROGRESS NOTES
Patient is a 78-year-old female admitted earlier today for confusion.  Patient seen and examined.  She is sleeping in bed during my visit, complains of eye pain.  Tramadol ordered for this.  However, concerned that she was at an ophthalmology clinic prior to admission, if pain persists, will need to contact ophthalmology department for more definitive management.    Other management per H&P from earlier today.    No charge,    Nahomy Negron MD MPH

## 2024-02-15 NOTE — CONSULTS
Patient has Medicare Advantage through [employer].    As of 1/1/23, formulary insulins are covered under all Medicare drug plans for $35 per 30 day supply year-round.  The formulary insulins under this plan are:    Rapid-acting insulin   Humalog Kwikpens      Intermediate   Humulin N Kwikpens   Humulin mix   Humalog mix      Basal   Lantus Solostar     Araceli Flores  Pharmacy Technician/Liaison, Discharge Pharmacy   [phone] (voice or text)  [email or web address]

## 2024-02-16 ENCOUNTER — APPOINTMENT (OUTPATIENT)
Dept: CT IMAGING | Facility: CLINIC | Age: 79
DRG: 637 | End: 2024-02-16
Payer: MEDICARE

## 2024-02-16 ENCOUNTER — HOSPITAL ENCOUNTER (INPATIENT)
Dept: NEUROLOGY | Facility: CLINIC | Age: 79
Discharge: HOME | DRG: 637 | End: 2024-02-16
Payer: MEDICARE

## 2024-02-16 ENCOUNTER — APPOINTMENT (OUTPATIENT)
Dept: GENERAL RADIOLOGY | Facility: CLINIC | Age: 79
DRG: 637 | End: 2024-02-16
Attending: PHYSICIAN ASSISTANT
Payer: MEDICARE

## 2024-02-16 ENCOUNTER — APPOINTMENT (OUTPATIENT)
Dept: GENERAL RADIOLOGY | Facility: CLINIC | Age: 79
DRG: 637 | End: 2024-02-16
Attending: STUDENT IN AN ORGANIZED HEALTH CARE EDUCATION/TRAINING PROGRAM
Payer: MEDICARE

## 2024-02-16 LAB
ALBUMIN SERPL BCG-MCNC: 3.1 G/DL (ref 3.5–5.2)
ALBUMIN UR-MCNC: 200 MG/DL
ALP SERPL-CCNC: 108 U/L (ref 40–150)
ALT SERPL W P-5'-P-CCNC: 9 U/L (ref 0–50)
ANION GAP SERPL CALCULATED.3IONS-SCNC: 10 MMOL/L (ref 7–15)
APPEARANCE CSF: CLEAR
APPEARANCE UR: CLEAR
AST SERPL W P-5'-P-CCNC: 23 U/L (ref 0–45)
BASE EXCESS BLDV CALC-SCNC: 1.8 MMOL/L (ref -3–3)
BASOPHILS # BLD AUTO: 0.1 10E3/UL (ref 0–0.2)
BASOPHILS NFR BLD AUTO: 1 %
BILIRUB DIRECT SERPL-MCNC: <0.2 MG/DL (ref 0–0.3)
BILIRUB SERPL-MCNC: 0.2 MG/DL
BILIRUB UR QL STRIP: NEGATIVE
BUN SERPL-MCNC: 20.8 MG/DL (ref 8–23)
C GATTII+NEOFOR DNA CSF QL NAA+NON-PROBE: NEGATIVE
CALCIUM SERPL-MCNC: 9.2 MG/DL (ref 8.8–10.2)
CHLORIDE SERPL-SCNC: 100 MMOL/L (ref 98–107)
CMV DNA CSF QL NAA+NON-PROBE: NEGATIVE
COLOR CSF: COLORLESS
COLOR UR AUTO: YELLOW
CREAT SERPL-MCNC: 0.89 MG/DL (ref 0.51–0.95)
DEPRECATED HCO3 PLAS-SCNC: 25 MMOL/L (ref 22–29)
E COLI K1 AG CSF QL: NEGATIVE
EGFRCR SERPLBLD CKD-EPI 2021: 67 ML/MIN/1.73M2
EOSINOPHIL # BLD AUTO: 0 10E3/UL (ref 0–0.7)
EOSINOPHIL NFR BLD AUTO: 0 %
ERYTHROCYTE [DISTWIDTH] IN BLOOD BY AUTOMATED COUNT: 13.4 % (ref 10–15)
ERYTHROCYTE [DISTWIDTH] IN BLOOD BY AUTOMATED COUNT: 13.4 % (ref 10–15)
EV RNA SPEC QL NAA+PROBE: NEGATIVE
GLUCOSE BLDC GLUCOMTR-MCNC: 101 MG/DL (ref 70–99)
GLUCOSE BLDC GLUCOMTR-MCNC: 151 MG/DL (ref 70–99)
GLUCOSE BLDC GLUCOMTR-MCNC: 164 MG/DL (ref 70–99)
GLUCOSE BLDC GLUCOMTR-MCNC: 188 MG/DL (ref 70–99)
GLUCOSE BLDC GLUCOMTR-MCNC: 190 MG/DL (ref 70–99)
GLUCOSE BLDC GLUCOMTR-MCNC: 73 MG/DL (ref 70–99)
GLUCOSE BLDC GLUCOMTR-MCNC: 82 MG/DL (ref 70–99)
GLUCOSE CSF-MCNC: 72 MG/DL (ref 40–70)
GLUCOSE SERPL-MCNC: 135 MG/DL (ref 70–99)
GLUCOSE UR STRIP-MCNC: >=1000 MG/DL
GP B STREP DNA CSF QL NAA+NON-PROBE: NEGATIVE
GROUP A STREP BY PCR: NOT DETECTED
HAEM INFLU DNA CSF QL NAA+NON-PROBE: NEGATIVE
HCO3 BLDV-SCNC: 26 MMOL/L (ref 21–28)
HCT VFR BLD AUTO: 42.3 % (ref 35–47)
HCT VFR BLD AUTO: 42.3 % (ref 35–47)
HGB BLD-MCNC: 14.3 G/DL (ref 11.7–15.7)
HGB BLD-MCNC: 14.3 G/DL (ref 11.7–15.7)
HGB UR QL STRIP: ABNORMAL
HHV6 DNA CSF QL NAA+NON-PROBE: NEGATIVE
HSV1 DNA CSF QL NAA+NON-PROBE: NEGATIVE
HSV2 DNA CSF QL NAA+NON-PROBE: NEGATIVE
HYALINE CASTS: 1 /LPF
IMM GRANULOCYTES # BLD: 0 10E3/UL
IMM GRANULOCYTES NFR BLD: 0 %
KETONES UR STRIP-MCNC: NEGATIVE MG/DL
L MONOCYTOG DNA CSF QL NAA+NON-PROBE: NEGATIVE
LACTATE SERPL-SCNC: 1.9 MMOL/L (ref 0.7–2)
LEUKOCYTE ESTERASE UR QL STRIP: NEGATIVE
LYMPH ABN NFR CSF MANUAL: 34 %
LYMPHOCYTES # BLD AUTO: 1.1 10E3/UL (ref 0.8–5.3)
LYMPHOCYTES NFR BLD AUTO: 10 %
MAGNESIUM SERPL-MCNC: 2.1 MG/DL (ref 1.7–2.3)
MCH RBC QN AUTO: 29.5 PG (ref 26.5–33)
MCH RBC QN AUTO: 29.5 PG (ref 26.5–33)
MCHC RBC AUTO-ENTMCNC: 33.8 G/DL (ref 31.5–36.5)
MCHC RBC AUTO-ENTMCNC: 33.8 G/DL (ref 31.5–36.5)
MCV RBC AUTO: 87 FL (ref 78–100)
MCV RBC AUTO: 87 FL (ref 78–100)
MONOCYTES # BLD AUTO: 0.7 10E3/UL (ref 0–1.3)
MONOCYTES NFR BLD AUTO: 6 %
MONOS+MACROS NFR CSF MANUAL: 45 %
MUCOUS THREADS #/AREA URNS LPF: PRESENT /LPF
N MEN DNA CSF QL NAA+NON-PROBE: NEGATIVE
NEUTROPHILS # BLD AUTO: 9.3 10E3/UL (ref 1.6–8.3)
NEUTROPHILS NFR BLD AUTO: 83 %
NEUTROPHILS NFR CSF MANUAL: 21 %
NITRATE UR QL: NEGATIVE
NRBC # BLD AUTO: 0 10E3/UL
NRBC BLD AUTO-RTO: 0 /100
O2/TOTAL GAS SETTING VFR VENT: 30 %
OXYHGB MFR BLDV: 80 % (ref 70–75)
PARECHOVIRUS A RNA CSF QL NAA+NON-PROBE: NEGATIVE
PCO2 BLDV: 38 MM HG (ref 40–50)
PH BLDV: 7.44 [PH] (ref 7.32–7.43)
PH UR STRIP: 5.5 [PH] (ref 5–7)
PHOSPHATE SERPL-MCNC: 3.4 MG/DL (ref 2.5–4.5)
PLATELET # BLD AUTO: 230 10E3/UL (ref 150–450)
PLATELET # BLD AUTO: 230 10E3/UL (ref 150–450)
PO2 BLDV: 42 MM HG (ref 25–47)
POTASSIUM SERPL-SCNC: 3.5 MMOL/L (ref 3.4–5.3)
PROCALCITONIN SERPL IA-MCNC: 0.13 NG/ML
PROT CSF-MCNC: 56.7 MG/DL (ref 15–45)
PROT SERPL-MCNC: 5.9 G/DL (ref 6.4–8.3)
RBC # BLD AUTO: 4.84 10E6/UL (ref 3.8–5.2)
RBC # BLD AUTO: 4.84 10E6/UL (ref 3.8–5.2)
RBC # CSF MANUAL: 420 /UL (ref 0–2)
RBC URINE: <1 /HPF
S PNEUM DNA CSF QL NAA+NON-PROBE: NEGATIVE
SAO2 % BLDV: 81.2 % (ref 70–75)
SODIUM SERPL-SCNC: 135 MMOL/L (ref 135–145)
SP GR UR STRIP: 1.02 (ref 1–1.03)
TUBE # CSF: 2
UROBILINOGEN UR STRIP-MCNC: NORMAL MG/DL
VZV DNA CSF QL NAA+NON-PROBE: NEGATIVE
WBC # BLD AUTO: 11.2 10E3/UL (ref 4–11)
WBC # BLD AUTO: 11.2 10E3/UL (ref 4–11)
WBC # CSF MANUAL: 10 /UL (ref 0–5)
WBC URINE: <1 /HPF

## 2024-02-16 PROCEDURE — 258N000003 HC RX IP 258 OP 636: Performed by: PHYSICIAN ASSISTANT

## 2024-02-16 PROCEDURE — 87040 BLOOD CULTURE FOR BACTERIA: CPT | Performed by: PHYSICIAN ASSISTANT

## 2024-02-16 PROCEDURE — 36415 COLL VENOUS BLD VENIPUNCTURE: CPT

## 2024-02-16 PROCEDURE — 87483 CNS DNA AMP PROBE TYPE 12-25: CPT | Performed by: STUDENT IN AN ORGANIZED HEALTH CARE EDUCATION/TRAINING PROGRAM

## 2024-02-16 PROCEDURE — 86788 WEST NILE VIRUS AB IGM: CPT | Performed by: SPECIALIST

## 2024-02-16 PROCEDURE — 250N000013 HC RX MED GY IP 250 OP 250 PS 637: Performed by: INTERNAL MEDICINE

## 2024-02-16 PROCEDURE — 81001 URINALYSIS AUTO W/SCOPE: CPT | Performed by: PHYSICIAN ASSISTANT

## 2024-02-16 PROCEDURE — 85027 COMPLETE CBC AUTOMATED: CPT | Performed by: PHYSICIAN ASSISTANT

## 2024-02-16 PROCEDURE — 84100 ASSAY OF PHOSPHORUS: CPT | Performed by: PHYSICIAN ASSISTANT

## 2024-02-16 PROCEDURE — 62328 DX LMBR SPI PNXR W/FLUOR/CT: CPT

## 2024-02-16 PROCEDURE — 120N000013 HC R&B IMCU

## 2024-02-16 PROCEDURE — 84145 PROCALCITONIN (PCT): CPT | Performed by: PHYSICIAN ASSISTANT

## 2024-02-16 PROCEDURE — 70450 CT HEAD/BRAIN W/O DYE: CPT | Mod: MG

## 2024-02-16 PROCEDURE — 83605 ASSAY OF LACTIC ACID: CPT

## 2024-02-16 PROCEDURE — 71045 X-RAY EXAM CHEST 1 VIEW: CPT

## 2024-02-16 PROCEDURE — 009U3ZX DRAINAGE OF SPINAL CANAL, PERCUTANEOUS APPROACH, DIAGNOSTIC: ICD-10-PCS | Performed by: RADIOLOGY

## 2024-02-16 PROCEDURE — 84157 ASSAY OF PROTEIN OTHER: CPT | Performed by: STUDENT IN AN ORGANIZED HEALTH CARE EDUCATION/TRAINING PROGRAM

## 2024-02-16 PROCEDURE — 83735 ASSAY OF MAGNESIUM: CPT | Performed by: PHYSICIAN ASSISTANT

## 2024-02-16 PROCEDURE — 99233 SBSQ HOSP IP/OBS HIGH 50: CPT | Mod: FS | Performed by: PHYSICIAN ASSISTANT

## 2024-02-16 PROCEDURE — 250N000013 HC RX MED GY IP 250 OP 250 PS 637: Performed by: HOSPITALIST

## 2024-02-16 PROCEDURE — 86789 WEST NILE VIRUS ANTIBODY: CPT | Performed by: SPECIALIST

## 2024-02-16 PROCEDURE — 87015 SPECIMEN INFECT AGNT CONCNTJ: CPT | Performed by: STUDENT IN AN ORGANIZED HEALTH CARE EDUCATION/TRAINING PROGRAM

## 2024-02-16 PROCEDURE — 99207 PR APP CREDIT; MD BILLING SHARED VISIT: CPT | Mod: FS | Performed by: INTERNAL MEDICINE

## 2024-02-16 PROCEDURE — 99207 PR APP CREDIT; MD BILLING SHARED VISIT: CPT

## 2024-02-16 PROCEDURE — 87205 SMEAR GRAM STAIN: CPT | Performed by: STUDENT IN AN ORGANIZED HEALTH CARE EDUCATION/TRAINING PROGRAM

## 2024-02-16 PROCEDURE — 95816 EEG AWAKE AND DROWSY: CPT

## 2024-02-16 PROCEDURE — 82805 BLOOD GASES W/O2 SATURATION: CPT

## 2024-02-16 PROCEDURE — 250N000011 HC RX IP 250 OP 636: Performed by: PHYSICIAN ASSISTANT

## 2024-02-16 PROCEDURE — 86592 SYPHILIS TEST NON-TREP QUAL: CPT | Performed by: STUDENT IN AN ORGANIZED HEALTH CARE EDUCATION/TRAINING PROGRAM

## 2024-02-16 PROCEDURE — 80048 BASIC METABOLIC PNL TOTAL CA: CPT | Performed by: PHYSICIAN ASSISTANT

## 2024-02-16 PROCEDURE — 82248 BILIRUBIN DIRECT: CPT | Performed by: PHYSICIAN ASSISTANT

## 2024-02-16 PROCEDURE — 250N000009 HC RX 250: Performed by: HOSPITALIST

## 2024-02-16 PROCEDURE — 82945 GLUCOSE OTHER FLUID: CPT | Performed by: STUDENT IN AN ORGANIZED HEALTH CARE EDUCATION/TRAINING PROGRAM

## 2024-02-16 PROCEDURE — 87651 STREP A DNA AMP PROBE: CPT | Performed by: INTERNAL MEDICINE

## 2024-02-16 PROCEDURE — 89051 BODY FLUID CELL COUNT: CPT | Performed by: STUDENT IN AN ORGANIZED HEALTH CARE EDUCATION/TRAINING PROGRAM

## 2024-02-16 PROCEDURE — 36415 COLL VENOUS BLD VENIPUNCTURE: CPT | Performed by: PHYSICIAN ASSISTANT

## 2024-02-16 PROCEDURE — 85025 COMPLETE CBC W/AUTO DIFF WBC: CPT | Performed by: PHYSICIAN ASSISTANT

## 2024-02-16 RX ORDER — CEFTRIAXONE 2 G/1
2 INJECTION, POWDER, FOR SOLUTION INTRAMUSCULAR; INTRAVENOUS EVERY 12 HOURS
Status: DISCONTINUED | OUTPATIENT
Start: 2024-02-16 | End: 2024-02-17

## 2024-02-16 RX ORDER — SODIUM CHLORIDE 9 MG/ML
INJECTION, SOLUTION INTRAVENOUS CONTINUOUS
Status: DISCONTINUED | OUTPATIENT
Start: 2024-02-16 | End: 2024-02-16

## 2024-02-16 RX ORDER — NITROGLYCERIN 0.4 MG/1
0.4 TABLET SUBLINGUAL EVERY 5 MIN PRN
Status: DISCONTINUED | OUTPATIENT
Start: 2024-02-16 | End: 2024-02-17

## 2024-02-16 RX ORDER — LISINOPRIL 2.5 MG/1
5 TABLET ORAL DAILY
Status: DISCONTINUED | OUTPATIENT
Start: 2024-02-17 | End: 2024-02-17

## 2024-02-16 RX ORDER — AMPICILLIN 2 G/1
2 INJECTION, POWDER, FOR SOLUTION INTRAVENOUS EVERY 4 HOURS
Status: DISCONTINUED | OUTPATIENT
Start: 2024-02-16 | End: 2024-02-17

## 2024-02-16 RX ORDER — LIDOCAINE 40 MG/G
CREAM TOPICAL
Status: DISCONTINUED | OUTPATIENT
Start: 2024-02-16 | End: 2024-02-17

## 2024-02-16 RX ORDER — DEXAMETHASONE SODIUM PHOSPHATE 4 MG/ML
10 INJECTION, SOLUTION INTRA-ARTICULAR; INTRALESIONAL; INTRAMUSCULAR; INTRAVENOUS; SOFT TISSUE EVERY 6 HOURS
Status: DISCONTINUED | OUTPATIENT
Start: 2024-02-16 | End: 2024-02-17

## 2024-02-16 RX ORDER — SODIUM CHLORIDE 9 MG/ML
INJECTION, SOLUTION INTRAVENOUS CONTINUOUS
Status: DISCONTINUED | OUTPATIENT
Start: 2024-02-16 | End: 2024-02-17

## 2024-02-16 RX ORDER — LIDOCAINE HYDROCHLORIDE 10 MG/ML
30 INJECTION, SOLUTION EPIDURAL; INFILTRATION; INTRACAUDAL; PERINEURAL ONCE
Status: COMPLETED | OUTPATIENT
Start: 2024-02-16 | End: 2024-02-16

## 2024-02-16 RX ORDER — GABAPENTIN 100 MG/1
200 CAPSULE ORAL 2 TIMES DAILY
Status: DISCONTINUED | OUTPATIENT
Start: 2024-02-16 | End: 2024-02-16

## 2024-02-16 RX ADMIN — SODIUM CHLORIDE: 9 INJECTION, SOLUTION INTRAVENOUS at 10:21

## 2024-02-16 RX ADMIN — AMPICILLIN 2 G: 2 INJECTION, POWDER, FOR SOLUTION INTRAMUSCULAR; INTRAVENOUS at 18:36

## 2024-02-16 RX ADMIN — LEVOTHYROXINE SODIUM 112 MCG: 112 TABLET ORAL at 08:04

## 2024-02-16 RX ADMIN — AMPICILLIN 2 G: 2 INJECTION, POWDER, FOR SOLUTION INTRAMUSCULAR; INTRAVENOUS at 23:13

## 2024-02-16 RX ADMIN — DEXTROSE AND SODIUM CHLORIDE: 5; 900 INJECTION, SOLUTION INTRAVENOUS at 17:41

## 2024-02-16 RX ADMIN — SODIUM CHLORIDE: 9 INJECTION, SOLUTION INTRAVENOUS at 18:03

## 2024-02-16 RX ADMIN — LIDOCAINE HYDROCHLORIDE 2 ML: 10 INJECTION, SOLUTION EPIDURAL; INFILTRATION; INTRACAUDAL; PERINEURAL at 15:03

## 2024-02-16 RX ADMIN — VANCOMYCIN HYDROCHLORIDE 2000 MG: 10 INJECTION, POWDER, LYOPHILIZED, FOR SOLUTION INTRAVENOUS at 20:30

## 2024-02-16 RX ADMIN — ACETAMINOPHEN 650 MG: 325 TABLET, FILM COATED ORAL at 13:26

## 2024-02-16 RX ADMIN — GABAPENTIN 900 MG: 300 CAPSULE ORAL at 08:04

## 2024-02-16 RX ADMIN — INSULIN GLARGINE 32 UNITS: 100 INJECTION, SOLUTION SUBCUTANEOUS at 08:03

## 2024-02-16 RX ADMIN — CEFTRIAXONE SODIUM 2 G: 2 INJECTION, POWDER, FOR SOLUTION INTRAMUSCULAR; INTRAVENOUS at 17:49

## 2024-02-16 RX ADMIN — DEXAMETHASONE SODIUM PHOSPHATE 10 MG: 4 INJECTION, SOLUTION INTRA-ARTICULAR; INTRALESIONAL; INTRAMUSCULAR; INTRAVENOUS; SOFT TISSUE at 18:40

## 2024-02-16 RX ADMIN — SODIUM CHLORIDE 1000 ML: 9 INJECTION, SOLUTION INTRAVENOUS at 11:55

## 2024-02-16 ASSESSMENT — ACTIVITIES OF DAILY LIVING (ADL)
ADLS_ACUITY_SCORE: 41

## 2024-02-16 NOTE — PROGRESS NOTES
PRIMARY Concern: confusion, unsteady , uncontrolled type 2 DM  SAFETY RISK Concerns: High falls risk, can be impulsive and jump OOB without calling   Isolation/Type: None  Tests/Procedures: none  Consults? OT, CM/SW   Where is patient from? Home   Other Important info for NEXT shift: encourage ambulation to bathroom  Anticipated DC date & active delays: 2/16 recommended to TCU, will be self-pay per CM-SW  _____________________________________________________  SUMMARY NOTE:  Orientation/Cognitive: A& O x2 oriented to self/place, consistently disoriented to time, disoriented to or poor insight into situation  Observation Goals (Met/ Not Met):Inpatient    Mobility Level/Assist Equipment: varies/labile--at times assist x1 gait belt walker, or SBA/cues to turn/repo; other times Ax2 to turn & repo  Antibiotics & Plan: IV abx discontinued  Pain Management: scheduled gabapentin  Tele/VS/O2: VSS on RA,   ABNL Lab/BG:   Diet: Mod CHO, low fat, low sodium; needs help ordering   Bowel/Bladder: ambulate to bathroom incontinent @ times  Skin Concerns: Mirza/redness/scaling of LE's   Drains/Devices: PIV L FA and R AC  Patient Stated Goal for Today: Monitor BG, work with CM team for discharge planning

## 2024-02-16 NOTE — PROGRESS NOTES
[facility]  Hospitalist Progress Note    Assessment & Plan   Coretta Kolb is a 78 year old female who was admitted on 2/13/2024.     Past medical history significant for HTN, HLP, DM2, Diabetic Neuropathy, Diabetic retinopathy, Obesity, Hypothyroidism, MDD with anxiety, Peripheral edema, Venous insufficiency who was registered to short-stay/observation due to hyperglycemia, pyuria, hypertension and encephalopathy.      Patient presented to the ED as her ophthalmology team was concerned with some increased confusion, as well as daughter.  Work-up in the ED revealed that patient's blood glucose was uncontrolled (glucose of 351 and on recheck at 508).  UA was grossly abnormal and concerning for UTI.  BP was elevated at 193/83.  Patient's daughter and family with some more longstanding concern about patient's safety to continue to live independently with her medication issues.    Per Admission H&P, patient with some increased confusion noted by her ophthalmology team earlier on day of presentation.  Daughter had also reported that she has had concerns about confusion in the past few weeks as well.  Potentially related to uncontrolled diabetes or urinary tract infection.  Given prior concern for patient's ability to safely live independently even 6 months ago before she signed out of her care facility and now medication nonadherence with increasing A1c, suspect this may be a more long-term issue.  Daughter feels the same.  1 fall reported at the end of summer since leaving care facility 6 months ago.    Acute mental status change (Acute worsening of encephalopathy versus seizure versus cerebral hypoperfusion versus medication side effect)  Metabolic encephalopathy  *Worsening mentation morning of 2/16.  Nurse paged regarding increased lethargy/somnolence.  Patient had almost fell while on the toilet as she was falling asleep.  She was unable to remain awake.  Glucose level was within normal  limits at time of assessment (188) and BP was normotensive.  Assessed patient and had RRT called.    *Discussed with House HO who began work-up.  Please see full note for details.    *Discussed with patient's daughter regarding mentation changes and work-up that was ordered/in process.    -Unclear how long patient was not taking medications and as such will adjust PTA medications to ensure they are not contributing to mentation changes.     --Discontinue amlodipine 10 mg/d.     --Decrease lisinopril to 5 mg/d.  Hold parameters in place.     --Decrease gabapentin to 200 mg TID.    -Ongoing acute AMS work-up:   --General Neurology consult appreciated.  --EEG, repeat head CT and consider repeat infectious work-up.       --EEG ordered.    --VBG ordered (pH of 7.44, pCO2 of 38, pO2 of 42, O2 sat of 81.2 and oxyhemoglobin of 80).     --CPAP when sleeping.     --Head CT ordered.     --IV fluids started at 75 ml/hr.  -Care management consultation for disposition planning.  -Occupational Therapy consulted for cognitive assessment and disposition planning.   --SLUMS score of 13/30 from 2/15.  --Noted to be below baseline.  Significant balance deficits and poor activity tolerance.    --Recommend TCU.    --Likely benefit from a Neuropsych referral at discharge.    -Encouraged patient to have her daughter, Janet, present at future appointments; she indicated that her daughter would be her healthcare decision-maker if she were unable to make decisions on her own.  ADDENDUM:  Spoke and reviewed plan with General Neurology.     **EEG consistent for toxic/metabolic encephalopathy no evidence of seizures.     --Blood cultures ordered x2.     --LP will be ordered with fluid studies per Neurology.    ADDENDUM #2:   --Add on Hepatic panel, Mag level and Phos level to previous blood draw.     --After discussion with Dr. Mendez Strep PCR throat swab was ordered.    Mild leukocytosis  -In the setting of acute AMS will repeat infectious  work-up.     --Add differential to previous CBC collected this AM.     --UA ordered with reflex to culture.     --1 view chest Xray ordered.      --Procal ordered.      Uncontrolled type 2 diabetes with neuropathy, retinopathy  Hyperglycemia  *Largely secondary to medication nonadherence.  Estimates that she misses her insulin twice per week.  [facility].  Hemoglobin A1c has been increasing on outpatient follow-up; improved to 7.9 during her stay at [facility] and subsequent assisted living facility, now greater than 11.4%  *Patient expressed financial concerns resulting in skipped insulin doses at times.    -Resumed on PTA insulin regimen including Lantus 32 units every morning, Humalog 75/25 (autosubbed to 70/30) 50 units in the morning, 60 units with dinner.   --Decrease Humalog supper dose to 50 units starting 2/16.    -Consider switch from 75/25 to 70/30 insulin if any significant cost benefit; will defer to endocrine clinic.  75/25 or 70/30 insulin may need to be increased with dose reduction of Lantus as well for cost savings.  -Recommend close follow-up with endocrine and consideration of oral hypoglycemic agents as attempting to wean from Lantus, which is likely the most expensive aspect of her diabetes regimen.    --Admitting Hospitalist discussed this with patient's daughter at bedside as well.    --Not starting oral hypoglycemic agents during hospitalization given observation admission.    --Patient reports inability to take metformin, but cannot state why this was the case (side effects or related to renal function in the past).   -Medium dose sliding scale insulin 4 times daily before meals and at bedtime.  -Glucose checks QID.    -Hypoglycemic protocol in place.    -Continue gabapentin 900 mg BID.      Pyuria  *Patient with urinary frequency, pyuria on urinalysis.  Symptoms and findings could potentially be secondary to uncontrolled diabetes as well.  *Urine culture  "with mixed urogenital luisa.     --Reviewed results with patient and her daughter.    -Stop ceftriaxone 2/15.      Hypothyroidism  -Resumed on PTA levothyroxine 100 mcg/d.    -Add on TSH; if significantly elevated, might suggest more issues with medication adherence at home.   --TSH elevated at 8.11 with low Free T4 of 0.88.      Depression  *Per Pharmacy med rec, patient no longer taking Cymbalta.      Hypotension  Hypertension  -Resumed on PTA amlodipine 10 mg/d and lisinopril 30 mg/d.  Hold parameters in place 2/15.     --Following RRT (2/16) have discontinued PTA amlodipine and reduced lisinopril to 5 mg/d.  Hold parameters in place.     --Started maintenance fluids at 75 ml/hr.    **BP of 98/55 late morning.     --IV fluid bolus of 1 L over 2 hours ordered.      HLP  -Hold PTA Crestor 20 mg/d.  Resume at discharge.      Peripheral edema  Venous insufficiency   - Hold PTA lasix 20 mg every M/W/F and resume at discharge.      Obesity   Body mass index is 41.57 kg/m .  Increase in all-cause morbidity and mortality.   - Follow up with PCP regarding ongoing management.       Clinically Significant Risk Factors Present on Admission                  # Hypertension: Home medication list includes antihypertensive(s)      # Severe Obesity: Estimated body mass index is 41.57 kg/m  as calculated from the following:    Height as of this encounter: 1.549 m (5' 1\").    Weight as of this encounter: 99.8 kg (220 lb).       # Financial/Environmental Concerns: other (see comments) (pt denies issues affording medication but daughter states pt told the doctor in ED she doesn't take her insulin everyday due to cost)           Diet: Combination Diet Moderate Consistent Carb (60 g CHO per Meal) Diet; Low Saturated Fat Na <2400mg Diet  Room Service     DVT Prophylaxis: Pneumatic Compression Devices   Rodriguez Catheter: Not present  Lines: None     Cardiac Monitoring: ACTIVE order. Indication: AMS; assessing for arrythmia  Code Status: " "Full Code            Disposition Plan    Unclear at this time as mentation worsened this morning and required RRT and broader work-up was started.  CM/SW is currently working on safe dispo and TCU placement as well.      The patient's care was discussed with the Bedside Nurse, Care Coordinator/, Patient, Patient's Family, and Charge Nurse, House HO .      The patient has been discussed with Dr. Mendez, who agrees with the assessment and plan at this time.    Jersey Tyler PA-C  [facility]  Securely message with the Vocera Web Console (learn more here)  Text page via [facility] Paging/Directory      Interval History   Patient was seen after being paged regarding lethargy and unable to stay awake.  Patient was in bed and answered to her name.  She could not answer where she was but was able to state it was Feb before falling back to sleep.      RRT was called and discussed with House HO.      Spoke with patient's daughter when she arrived to the unit.      -Data reviewed today: I reviewed all new labs and imaging results over the last 24 hours. I personally reviewed no images or EKG's today.    Physical Exam   BP 98/55 (BP Location: Left arm)   Pulse 68   Temp 99.1  F (37.3  C) (Oral)   Resp 22   Ht 1.549 m (5' 1\")   Wt 99.8 kg (220 lb)   SpO2 94%   BMI 41.57 kg/m        Constitutional: Asleep but awakened to name briefly.  When asked questions she would respond briefly mostly with incorrect information or no answer and then closer her eyes and fall back to sleep.  She was somewhat cooperative but towards the end of assessment appeared frustrated with questions and tests.    ENT: Normocephalic, without obvious abnormality, atraumatic, oral pharynx with dry mucus membranes, tonsils without erythema or exudates.  Neck: Supple, symmetrical, trachea midline, no adenopathy.  Pulmonary: No increased work of breathing, fair air exchange, clear to auscultation " bilaterally, no crackles or wheezing.  Cardiovascular: Regular rate and rhythm, normal S1 and S2, no S3 or S4, and no murmur noted.  GI: Normal bowel sounds, soft, non-distended, non-tender. Obese.    Skin/Integumen: Venous stasis change noted bilaterally otherwise visualized skin appeared clear.  Neuro: Lethargic/tired.  Unable to remain awake or answer questions appropriately.    Extremities: No lower extremity edema noted, and calves are non-TTP bilaterally.       Medical Decision Making       Please see A&P for additional details of medical decision making.  Greater than 50 MINUTES SPENT BY ME on the date of service doing chart review, history, exam, documentation & further activities per the note.         Medications    sodium chloride 75 mL/hr at 02/16/24 1021      gabapentin  200 mg Oral BID    insulin aspart  1-7 Units Subcutaneous TID AC    insulin aspart  1-5 Units Subcutaneous At Bedtime    insulin aspart prot & aspart  50 Units Subcutaneous Daily with supper    insulin glargine  32 Units Subcutaneous QAM    levothyroxine  112 mcg Oral Daily    [START ON 2/17/2024] lisinopril  5 mg Oral Daily    sodium chloride (PF)  3 mL Intracatheter Q8H    sodium chloride 0.9%  1,000 mL Intravenous Once       Data   Recent Labs   Lab 02/16/24  1142 02/16/24  0857 02/16/24  0719 02/16/24  0605 02/14/24  0756 02/14/24  0627 02/13/24 2018 02/13/24 2012   WBC  --   --   --  11.2*  --  9.7  --  8.9   HGB  --   --   --  14.3  --  14.0  --  15.1   MCV  --   --   --  87  --  87  --  87   PLT  --   --   --  230  --  143*  --  256   NA  --   --   --  135  --  135  --  134*   POTASSIUM  --   --   --  3.5  --  4.5  --  3.6   CHLORIDE  --   --   --  100  --  100  --  96*   CO2  --   --   --  25  --  24  --  28   BUN  --   --   --  20.8  --  11.5  --  10.4   CR  --   --   --  0.89  --  0.68  --  0.72   ANIONGAP  --   --   --  10  --  11  --  10   KATERIN  --   --   --  9.2  --  9.3  --  9.5   * 188* 190* 135*   < > 294*   < >  431*   ALBUMIN  --   --   --   --   --   --   --  3.6   PROTTOTAL  --   --   --   --   --   --   --  7.1   BILITOTAL  --   --   --   --   --   --   --  0.3   ALKPHOS  --   --   --   --   --   --   --  147   ALT  --   --   --   --   --   --   --  12   AST  --   --   --   --   --   --   --  16    < > = values in this interval not displayed.       No results found for this or any previous visit (from the past 24 hour(s)).

## 2024-02-16 NOTE — PLAN OF CARE
"Goal Outcome Evaluation:  PRIMARY Concern: confusion, unsteady , uncontrolled type 2 DM  SAFETY RISK Concerns: High falls risk, impulsive and jump OOB without calling   Isolation/Type: None  Tests/Procedures: none  Consults? OT, CM/SW   Where is patient from? Home   Other Important info for NEXT shift:   Anticipated DC date & active delays: 2/16 recommended to TCU, will be self-pay per CM-SW  _____________________________________________________  SUMMARY NOTE:  Orientation/Cognitive: oriented to self/place, consistently disoriented to time, disoriented to situation. Pt often ask \"why\" to every questions or answers. Disorganized thoughts at times but redirectable.   Observation Goals (Met/ Not Met):Inpatient    Mobility Level/Assist Equipment: varies/labile--at times assist x1 gait belt walker, other times Ax2 to turn & repo. Pt can turn herself with cues in bed   Antibiotics & Plan: IV abx discontinued  Pain Management: scheduled gabapentin. Denies pain  Tele/VS/O2: VSS on RA,  ABNL Lab/BG:   Diet: Mod CHO, low fat, low sodium; needs help ordering. On Room service   Bowel/Bladder:  incontinent @ times  Skin Concerns: Mirza/redness/scaling of LE's   Drains/Devices: PIV L FA and R AC  Patient Stated Goal for Today: Monitor BG, work with CM team for discharge planning                       "

## 2024-02-16 NOTE — PROGRESS NOTES
MD Notification    Notified Person: MD    Notified Person Name: Jamie Tyler    Notification Date/Time: 2/16/24    Notification Interaction: vocera message    Purpose of Notification: Lab called with critical lab on pt in 503: CSF gram stain showing 2+ gram positive cocci

## 2024-02-16 NOTE — PROGRESS NOTES
Care Management Follow Up    Length of Stay (days): 1    Expected Discharge Date: 02/17/2024     Concerns to be Addressed: cognitive/perceptual, compliance issue, discharge planning, medication     Patient plan of care discussed at interdisciplinary rounds: Yes    Anticipated Discharge Disposition: TCU     Anticipated Discharge Services:    Anticipated Discharge DME:      Patient/family educated on Medicare website which has current facility and service quality ratings:    Education Provided on the Discharge Plan:    Patient/Family in Agreement with the Plan: yes    Referrals Placed by CM/SW:    Private pay costs discussed: private room/amenity fees    Additional Information:  Pt has been accepted to [facility] when ready. Updated daughter, who is agreeable, states pt will likely want a private room, she is aware this is an out of pocket cost of $45/day. Spoke with Tisha from [facility], who states they can accept for Sunday if pt is ready, their Saturday admissions are full. Tisha confirmed she is able to make a double room a private room for pt.     Per Tisha, Saturday SW will need to call Tisha at [facility] on Saturday to learn what their weekend contact information is so they can facilitate discharge on Sunday if pt is medically ready.    BERENICE Jacques  Social Work  [facility]

## 2024-02-16 NOTE — CODE/RAPID RESPONSE
[facility]    RRT Note  2/16/2024   Time Called: 8:55    Code Status: Full Code    I was called to evaluate Coretta Kolb, who is a 78 year old female with a past medical history significant for hypertension, hyperlipidemia, DM type II, diabetic neuropathy, diabetic retinopathy, obesity, hypothyroidism, MDD with anxiety, peripheral edema, and  venous insufficiency who was admitted on 2/13/2024 for hyperglycemia, pyuria, hypertension, and encephalopathy.    Assessment & Plan   Acute encephalopathy suspect 2/2 seizure vs. hypercapnic respiratory failure vs. cerebral hypoperfusion vs. medication side effect  RRT called for CODE STATUS.  Shortly before RRT patient ambulated to the bathroom to void. Shortly after getting back to bed RN reports she developed sudden blank stare, was unable to speak.  Upon arrival patient is lying in bed, not in acute respiratory distress.  Primary hospitalist Jamie Tyler PA-C, NORIS is at the bedside.  He reports he is unable to evaluate for facial droop.  Vital signs include temp 99.5, HR 79, /54, RR 18, SpO2 93% on room air.  Patient recently restarted on antihypertensive regimen.  Patient's BPs were elevated in the 190s on admission.  Altered mental status may be related to cerebral hypoperfusion.  Patient initially arousable to repeated touch, and voice.  She was slow to respond.  With ongoing conversation patient became more alert.  Able to follow commands.  Patient does not have any overt focal neurological deficits.  Her neurological status appears to be more consistent with encephalopathy.      It is reported that patient had moments of blank stare and slow to respond, which was reported in ER provider note.  She has not had any seizure workup thus far.  No documented history of seizures.  Given patient was alert and ambulating shortly before this episode, absence seizure is high in differential.  Encephalopathy possibly due to postictal state.   "Also considered hypercapnic respiratory failure, however CO2 38.  RN reports patient was sleeping this morning, she did have episodes of apnea and expressed concern that she had some undiagnosed sleep apnea.  Patient may also have some underlying obesity hypoventilation syndrome. MAR has been reviewed.  Patient has not recently received any sedating medications.  Has not received tramadol since 2/14. Patient was restarted on PTA gabapentin 900 mg BID yesterday, which may be contributing to AMS.    Other differentials considered include infection, acute ischemic stroke, acute intracranial, hemorrhage, and  hypoactive delirium.    INTERVENTIONS:  -VBG  -Lactic 1.9  -VBG 7.44, CO2 38, O2 42, HCO3 26  -EEG  -General Neurology consult  -q4h neuro checks   -Amlodipine discontinued and Gabapentin dose reduced by Hospitalist  -CPAP while sleeping  -CT head w/o contrast: no acute intracranial pathology  -Telemetry    At end of evaluation, patient is resting in bed, arouses to voice. Remains hemodynamically stable. Patient will remain on short stay unit. Discussed with and defer further cares to primary hospitalist Dr. Jamie Tyler PA-C.      Marlon Underwood NP  [facility]  Securely message with the Vocera Web Console (learn more here)  Text page via [facility] Paging/Directory          Physical Exam   Vital Signs with Ranges:  Temp:  [97.8  F (36.6  C)-99.5  F (37.5  C)] 99.5  F (37.5  C)  Pulse:  [] 79  Resp:  [18-22] 18  BP: (104-128)/(44-64) 117/54  SpO2:  [90 %-97 %] 93 %  I/O last 3 completed shifts:  In: 720 [P.O.:720]  Out: -       Physical Exam   EXAM:   Nursing Notes Reviewed.  /54 (BP Location: Left arm)   Pulse 79   Temp 99.5  F (37.5  C) (Oral)   Resp 18   Ht 1.549 m (5' 1\")   Wt 99.8 kg (220 lb)   SpO2 93%   BMI 41.57 kg/m     General:  Appears stated age, no acute distress. Lethargic, arouseable to repeated touch and voice, but drifts back to sleep. Oriented to " "self.  Skin:  Warm, dry. Bilateral lower extremities dry, robert appearing.  HEENT:  Normocephalic, atraumatic. Pupils size 2, brisk. No nystagmus or deviated gaze.   Neck:  Supple.  Chest:  Breath sounds CTA and no increased work of breathing on room air.  Cardiovascular:  RRR, no rub or murmur.  Abdomen:  Soft, non-tender, non-distended.  Musculoskeletal:  Moves all four extremities. No muscle atrophy.  Neurological:  Slow to respond, but able to follow commands. No focal neurological deficts.  Psychiatric:  Affect and mood congruent.    Data     IMAGING: (X-ray/CT/MRI)   No results found for this or any previous visit (from the past 24 hour(s)).    CBC with Diff:  Recent Labs   Lab Test 02/16/24  0605   WBC 11.2*   HGB 14.3   MCV 87         No results found for: \"RETICABSCT\"  No results found for: \"RETP\"    Comprehensive Metabolic Panel:  Recent Labs   Lab 02/16/24  0857 02/16/24  0719 02/16/24  0605 02/13/24 2018 02/13/24 2012   NA  --   --  135   < > 134*   POTASSIUM  --   --  3.5   < > 3.6   CHLORIDE  --   --  100   < > 96*   CO2  --   --  25   < > 28   ANIONGAP  --   --  10   < > 10   *   < > 135*   < > 431*   BUN  --   --  20.8   < > 10.4   CR  --   --  0.89   < > 0.72   GFRESTIMATED  --   --  67   < > 86   KATERIN  --   --  9.2   < > 9.5   PROTTOTAL  --   --   --   --  7.1   ALBUMIN  --   --   --   --  3.6   BILITOTAL  --   --   --   --  0.3   ALKPHOS  --   --   --   --  147   AST  --   --   --   --  16   ALT  --   --   --   --  12    < > = values in this interval not displayed.         Time Spent on this Encounter       CRITICAL CARE TIME  I spent 45 minutes of critical care time on the unit/floor managing the care of Coretta Kolb. Upon evaluation, this patient had a high probability of imminent or life-threatening deterioration due to Altered mental status which required my direct attention, intervention, and personal management. 100% of my time was spent at the bedside counseling the " patient and/or coordinating care regarding services listed in this note.

## 2024-02-16 NOTE — CONSULTS
[facility]    Neurology Consultation    Coretta Kolb MRN# [id number]   YOB: 1945 Age: 78 year old    Code Status:Full Code   Date of Admission: 2/13/2024  Date of Consult:02/16/2024                                                                                       Assessment and Plan:                                         #. Episode of altered awareness, encephalopathy  -- Patient was admitted for more confusion when seen in the outpatient setting.  Workup has shown hyperglycemia, but negative urine culture and viral studies.  Her TSH on admission was notable for an elevation consistent with hypothyroidism.  This may also be contributing to her cognitive decline and confusion in addition to her hyperglycemia.  She does have a slight bump in white count this morning, given her continued confusion would consider reevaluating infection per primary team.  Her episode of altered awareness today could be related to her continued metabolic abnormalities, delirium, and highly suspected untreated LUIS.  EEG ordered, will evaluate for seizures.  CT head on arrival negative for any acute abnormality, repeat CT pending.  Recommendations as below:  -Continue metabolic corrections per primary team and both regards to glucose as well as hypothyroidism.   -EEG consistent with encephalopathy, no seizures or epileptiform discharges.   -CT head unremarkable on arrival aside from chronic microvascular ischemic change, personally reviewed today's CT head and negative for any acute bleeds or acute ischemic changes.  -Given the bump in white count, consider repeat infectious workup (procal normal)  -Consider LP, will place order and labs.   -Encourage use of CPAP machine during sleep, including when the patient sleeps during the day.     ADDENDUM: CSF gram stain showing gram positive cocci, primary team has started antibiotics, steroids, and consulted ID. Further studies and culture pending,  agree with this treatment.       ----------------------------------------------------------------------------------  ----------------------------------------------------------------------------------  Reason for consult: as above       Chief Complaint:   Altered Awareness            History of Present Illness:   This patient is a 78 year old female who presents with suspected hyperglycemia and UTI encephalopathy.     Patient was admitted on 2/13/24 due to confusion.  She was found to be hyperglycemic as well as have a UTI.  There is also concern that she has some underlying cognitive issues and plan was to do an outpatient neuropsychiatry assessment.  This morning patient ambulated to the bathroom and when she got back to bed she developed a blank stare and was unable to speak.  No abnormal movements or complete loss of consciousness.  Vital signs were within normal limits, blood pressure 117/54 on admission she was in the 190s.  Primary team suspects potentially some cerebral hypoperfusion given the position changes.  Patient was arousable to repeated touch and voice and later became more alert.  She has had these episodes of blank stares since admission.  No history of seizures.  Nursing reports patient does have episodes of apnea while sleeping and concerns that she may have underlying LUIS.  Have now ordered a CPAP machine to be used when patient sleeps.     Daughter at bedside reports that she has had fluctuations in her awareness during admission.  She is also often sleeping during the day.  Urine culture has eventually grown negative and only mixture of urogenital luisa.     MEDICAL DOCUMENTATION REVIEWED: Code note 2/16           Past Medical History:     Past Medical History:   Diagnosis Date     Diabetes (H)      Hypertension      Hypothyroid          Past Surgical History:     Past Surgical History:   Procedure Laterality Date     ANESTHESIA OUT OF OR MRI N/A 1/11/2023    Procedure: ANESTHESIA OUT OF OR  LUMBAR MRI;  Surgeon: GENERIC ANESTHESIA PROVIDER;  Location: [facility] OR     ORTHOPEDIC SURGERY            Social History:     Social History     Socioeconomic History     Marital status: Single   Tobacco Use     Smoking status: Never     Passive exposure: Never   Vaping Use     Vaping Use: Never used   Substance and Sexual Activity     Alcohol use: Yes     Comment: occ     Drug use: No         Family History:   No family history on file.         Home Medications:     Prior to Admission Medications   Prescriptions Last Dose Informant Patient Reported? Taking?   acetaminophen (TYLENOL) 325 MG tablet Past Week  No Yes   Sig: Take 3 tablets (975 mg) by mouth every 8 hours   Patient taking differently: Take 975 mg by mouth every 8 hours as needed   amLODIPine (NORVASC) 10 MG tablet Past Month Self Yes Yes   Sig: Take 10 mg by mouth daily   furosemide (LASIX) 20 MG tablet Past Month  Yes Yes   Sig: Take 1 Tablet (20 mg) by mouth every Monday, Wednesday,Friday. In the morning   gabapentin (NEURONTIN) 300 MG capsule Past Month Self Yes Yes   Sig: Take 900 mg by mouth 2 times daily   insulin glargine (LANTUS VIAL) 100 UNIT/ML soln Past Month Self Yes Yes   Sig: Inject 32 Units Subcutaneous every morning   insulin lispro (HUMALOG KWIKPEN) 100 UNIT/ML (1 unit dial) KWIKPEN Not Taking  No No   Sig: Inject insulin three times daily before meals and bedtime:  If Pre-meal Glucose  140 -164 give 1 unit  165 -189 give 2 units  190 -214 give 3 units  215 -239 give 4 units  240 -264 give 5 units  265 -289 give 6 units  290 -314 give 7 units  315 -339 give 8 units  340+ give 9 units    If Bedtime Glucose  200 -214 give 1 unit  215 -264 give 2 units  265 -314 give 3 units  315+ give 4 units   Patient not taking: Reported on 2024   insulin lispro protamine-insulin lispro (HUMALOG MIX 75/25 KWIKPEN) (75-25) 100 UNIT/ML pen Past Month Self Yes Yes   Si units with breakfast in the morning and 60 units with dinner in the evening  "  levalbuterol (XOPENEX HFA) 45 MCG/ACT inhaler   Yes Yes   Sig: Inhale 2 puffs into the lungs every 4 hours as needed   levothyroxine (SYNTHROID/LEVOTHROID) 112 MCG tablet Past Month Self Yes Yes   Sig: Take 112 mcg by mouth daily   lisinopril (ZESTRIL) 30 MG tablet Past Month Self Yes Yes   Sig: Take 30 mg by mouth daily   rosuvastatin (CRESTOR) 20 MG tablet Past Month Self Yes Yes   Sig: Take 20 mg by mouth at bedtime   terbinafine (LAMISIL) 250 MG tablet More than a month Self Yes Yes   Sig: Take 250 mg by mouth Daily for one week each month   triamcinolone (KENALOG) 0.025 % external ointment Past Month Self Yes Yes   Sig: Apply topically 2 times daily To lower legs      Facility-Administered Medications: None          Allergy:     Allergies   Allergen Reactions     Hydrocodone-Acetaminophen      constipated          Inpatient Medications:   Scheduled Meds:    gabapentin  200 mg Oral BID     insulin aspart  1-7 Units Subcutaneous TID AC     insulin aspart  1-5 Units Subcutaneous At Bedtime     insulin aspart prot & aspart  50 Units Subcutaneous Daily with supper     insulin glargine  32 Units Subcutaneous QAM     levothyroxine  112 mcg Oral Daily     [START ON 2/17/2024] lisinopril  5 mg Oral Daily     sodium chloride (PF)  3 mL Intracatheter Q8H     PRN Meds: acetaminophen **OR** acetaminophen, bisacodyl, glucose **OR** dextrose **OR** glucagon, lidocaine 4%, lidocaine (buffered or not buffered), melatonin, naloxone **OR** naloxone **OR** naloxone **OR** naloxone, ondansetron **OR** ondansetron, polyethylene glycol, prochlorperazine **OR** prochlorperazine **OR** prochlorperazine, senna-docusate **OR** senna-docusate, sodium chloride (PF), traMADol          Physical Exam:   Physical Exam   Vitals:  Height:5' 1\"  Weight:220 lbs 0 oz   Temp: 99.5  F (37.5  C) Temp src: Oral BP: 117/54 Pulse: 79   Resp: 18 SpO2: 93 % O2 Device: None (Room air)    General Appearance: No acute distress, obese body habitus  Neuro " "Exam:  Mental Status Exam: Patient sleeping upon arrival, episodes of snoring noted.  Patient was somnolent and had difficulty arousing to voice and sternal rub.  Was confused and would state \"stop it Janet\" (her daughter ).  Would constantly ask \"why\" when asked to open her eyes.  She opened her eyes spontaneously to nursing.    Cranial Nerves:  Pupils are equal and reactive to light.  Slightly follows examiners face but does not fully follow instructions for extraocular movement assessment.  Face seems symmetric at rest and with grimace.   Motor: Motor tone and bulk are intact in extremities.  She is able to move her lower extremities spontaneously and gradually was her arms down when lifted over her head.  Appears to at least have antigravity strength in all her extremities  Reflexes: Diminished reflexes throughout, absent at the patella and ankles.  Mute plantar signs  Sensory: She grimaces to pinch and voices \"ouch\" to noxious stimuli in all extremities  Coordination: Unable to perform  Gait: Unable to  Extremities: No clubbing, no cyanosis, no edema          Data:   ROUTINE IP LABS   CBC RESULTS:     Recent Labs   Lab 02/16/24  0605 02/14/24 0627 02/13/24 2012   WBC 11.2* 9.7 8.9   RBC 4.84 4.73 5.11   HGB 14.3 14.0 15.1   HCT 42.3 41.3 44.4    143* 256     Basic Metabolic Panel:   Recent Labs   Lab Test 02/16/24  0857 02/16/24  0719 02/16/24  0605 02/14/24  0756 02/14/24 0627 02/13/24 2018 02/13/24 2012   NA  --   --  135  --  135  --  134*   POTASSIUM  --   --  3.5  --  4.5  --  3.6   CHLORIDE  --   --  100  --  100  --  96*   CO2  --   --  25  --  24  --  28   BUN  --   --  20.8  --  11.5  --  10.4   CR  --   --  0.89  --  0.68  --  0.72   * 190* 135*   < > 294*   < > 431*   KATERIN  --   --  9.2  --  9.3  --  9.5    < > = values in this interval not displayed.     Liver panel:  Recent Labs   Lab Test 02/13/24 2012 11/30/22  0920   PROTTOTAL 7.1 7.5   ALBUMIN 3.6 3.0*   BILITOTAL 0.3 0.5 "   ALKPHOS 147 114   AST 16 35   ALT 12 27     Lipid Profile:No lab results found.  Thyroid Panel:  Recent Labs   Lab Test 02/13/24 2012 03/14/23  1130   TSH 8.11* 3.25   T4 0.88*  --       Vitamin B12: No lab results found.       EEG 2/16/24  This electroencephalogram in the confused state is abnormal due to the presence of moderately diffuse slowing of the background.  This is consistent with generalized encephalopathy which is nonspecific in etiology.  Diagnostic possibilities would include toxic metabolic encephalopathy.  No epileptic abnormalities were noted during recording without evidence of nonconvulsive seizures.  At times mild slowing was observed in the right frontal region which is most consistent with lead/movement artifact; however, clinical correlation advised for disorder localized in the right lateral frontal region     IMAGING:   Independent interpretation of the following studies by myself as part of today's encounter.   CT head 2/16/24:   --Personally reviewed, mild hypodensities noted consistent with mild presumed chronic small vessel ischemic changes. Mild atrophy, though appears consistent with patient's age.  No signs of bleeds or acute ischemic changes.  Stable compared to CT from 2/13    Sybil Plaza M.D.  [facility]  Office [phone]

## 2024-02-16 NOTE — PROGRESS NOTES
ADDENDUM/UPDATE:    As patient has had continued altered mental status throughout the day and as such has limited to no PO intake.  Most recent glucose level of 82.    - Will stop Humalog 75/25 mix at this time.    - Will decrease AM Lantus to 15 units.    - Will start D5 NS at 100 ml with goal to maintain glucose between 100-150.    - Glucose checks ordered every 4 hours with sliding scale insulin.    - Will stop gabapentin at this time completely to avoid sedating medications.    - Patient care order placed only allow patient to eat when she is awake and alert as well as aspiration precautions ordered.    - Monitor on telemetry.    Delirium prevention:   --Reorient patient at each interaction.  --Keep room lights on and blinds open during day (8am-8pm), low lights 8pm-8am.  --Minimize interruptions of sleep at night (consolidate vitals, nursing assessments, medications).    Jersey Tyler PA-C  [facility]  Securely message with the Vocera Web Console (learn more here)  Text page via [employer] Paging/Directory

## 2024-02-17 ENCOUNTER — APPOINTMENT (OUTPATIENT)
Dept: MRI IMAGING | Facility: CLINIC | Age: 79
DRG: 637 | End: 2024-02-17
Attending: HOSPITALIST
Payer: MEDICARE

## 2024-02-17 LAB
ALBUMIN SERPL BCG-MCNC: 3.2 G/DL (ref 3.5–5.2)
ALP SERPL-CCNC: 125 U/L (ref 40–150)
ALT SERPL W P-5'-P-CCNC: 11 U/L (ref 0–50)
ANION GAP SERPL CALCULATED.3IONS-SCNC: 12 MMOL/L (ref 7–15)
AST SERPL W P-5'-P-CCNC: 21 U/L (ref 0–45)
BASOPHILS # BLD AUTO: 0 10E3/UL (ref 0–0.2)
BASOPHILS NFR BLD AUTO: 0 %
BILIRUB SERPL-MCNC: 0.2 MG/DL
BUN SERPL-MCNC: 19.9 MG/DL (ref 8–23)
CALCIUM SERPL-MCNC: 8.6 MG/DL (ref 8.8–10.2)
CHLORIDE SERPL-SCNC: 103 MMOL/L (ref 98–107)
CREAT SERPL-MCNC: 0.73 MG/DL (ref 0.51–0.95)
DEPRECATED HCO3 PLAS-SCNC: 21 MMOL/L (ref 22–29)
EGFRCR SERPLBLD CKD-EPI 2021: 85 ML/MIN/1.73M2
EOSINOPHIL # BLD AUTO: 0 10E3/UL (ref 0–0.7)
EOSINOPHIL NFR BLD AUTO: 0 %
ERYTHROCYTE [DISTWIDTH] IN BLOOD BY AUTOMATED COUNT: 13.4 % (ref 10–15)
GLUCOSE BLDC GLUCOMTR-MCNC: 228 MG/DL (ref 70–99)
GLUCOSE BLDC GLUCOMTR-MCNC: 261 MG/DL (ref 70–99)
GLUCOSE BLDC GLUCOMTR-MCNC: 277 MG/DL (ref 70–99)
GLUCOSE BLDC GLUCOMTR-MCNC: 284 MG/DL (ref 70–99)
GLUCOSE BLDC GLUCOMTR-MCNC: 288 MG/DL (ref 70–99)
GLUCOSE BLDC GLUCOMTR-MCNC: 301 MG/DL (ref 70–99)
GLUCOSE BLDC GLUCOMTR-MCNC: 307 MG/DL (ref 70–99)
GLUCOSE BLDC GLUCOMTR-MCNC: 313 MG/DL (ref 70–99)
GLUCOSE BLDC GLUCOMTR-MCNC: 317 MG/DL (ref 70–99)
GLUCOSE SERPL-MCNC: 276 MG/DL (ref 70–99)
HCT VFR BLD AUTO: 42.1 % (ref 35–47)
HGB BLD-MCNC: 14 G/DL (ref 11.7–15.7)
IMM GRANULOCYTES # BLD: 0.1 10E3/UL
IMM GRANULOCYTES NFR BLD: 1 %
LYMPHOCYTES # BLD AUTO: 0.7 10E3/UL (ref 0.8–5.3)
LYMPHOCYTES NFR BLD AUTO: 7 %
MCH RBC QN AUTO: 29.3 PG (ref 26.5–33)
MCHC RBC AUTO-ENTMCNC: 33.3 G/DL (ref 31.5–36.5)
MCV RBC AUTO: 88 FL (ref 78–100)
MONOCYTES # BLD AUTO: 0.1 10E3/UL (ref 0–1.3)
MONOCYTES NFR BLD AUTO: 1 %
NEUTROPHILS # BLD AUTO: 9 10E3/UL (ref 1.6–8.3)
NEUTROPHILS NFR BLD AUTO: 91 %
NRBC # BLD AUTO: 0 10E3/UL
NRBC BLD AUTO-RTO: 0 /100
PLATELET # BLD AUTO: 202 10E3/UL (ref 150–450)
POTASSIUM SERPL-SCNC: 4 MMOL/L (ref 3.4–5.3)
PROT SERPL-MCNC: 6.7 G/DL (ref 6.4–8.3)
RBC # BLD AUTO: 4.78 10E6/UL (ref 3.8–5.2)
SODIUM SERPL-SCNC: 136 MMOL/L (ref 135–145)
WBC # BLD AUTO: 9.9 10E3/UL (ref 4–11)

## 2024-02-17 PROCEDURE — 82247 BILIRUBIN TOTAL: CPT | Performed by: PHYSICIAN ASSISTANT

## 2024-02-17 PROCEDURE — 36415 COLL VENOUS BLD VENIPUNCTURE: CPT | Performed by: PHYSICIAN ASSISTANT

## 2024-02-17 PROCEDURE — 250N000013 HC RX MED GY IP 250 OP 250 PS 637: Performed by: PHYSICIAN ASSISTANT

## 2024-02-17 PROCEDURE — 99223 1ST HOSP IP/OBS HIGH 75: CPT | Performed by: SPECIALIST

## 2024-02-17 PROCEDURE — 258N000003 HC RX IP 258 OP 636: Performed by: HOSPITALIST

## 2024-02-17 PROCEDURE — 99418 PROLNG IP/OBS E/M EA 15 MIN: CPT | Performed by: HOSPITALIST

## 2024-02-17 PROCEDURE — 250N000011 HC RX IP 250 OP 636: Performed by: HOSPITALIST

## 2024-02-17 PROCEDURE — 99233 SBSQ HOSP IP/OBS HIGH 50: CPT | Performed by: HOSPITALIST

## 2024-02-17 PROCEDURE — 36415 COLL VENOUS BLD VENIPUNCTURE: CPT | Performed by: SPECIALIST

## 2024-02-17 PROCEDURE — 70553 MRI BRAIN STEM W/O & W/DYE: CPT | Mod: MG

## 2024-02-17 PROCEDURE — 250N000009 HC RX 250: Performed by: HOSPITALIST

## 2024-02-17 PROCEDURE — 250N000013 HC RX MED GY IP 250 OP 250 PS 637: Performed by: HOSPITALIST

## 2024-02-17 PROCEDURE — 85004 AUTOMATED DIFF WBC COUNT: CPT | Performed by: PHYSICIAN ASSISTANT

## 2024-02-17 PROCEDURE — 255N000002 HC RX 255 OP 636: Performed by: HOSPITALIST

## 2024-02-17 PROCEDURE — A9585 GADOBUTROL INJECTION: HCPCS | Performed by: HOSPITALIST

## 2024-02-17 PROCEDURE — 258N000003 HC RX IP 258 OP 636: Performed by: PHYSICIAN ASSISTANT

## 2024-02-17 PROCEDURE — 999N000128 HC STATISTIC PERIPHERAL IV START W/O US GUIDANCE

## 2024-02-17 PROCEDURE — 250N000011 HC RX IP 250 OP 636: Performed by: PHYSICIAN ASSISTANT

## 2024-02-17 PROCEDURE — 86652 ENCEPHALTIS EAST EQNE ANBDY: CPT | Performed by: SPECIALIST

## 2024-02-17 PROCEDURE — 120N000013 HC R&B IMCU

## 2024-02-17 PROCEDURE — 999N000040 HC STATISTIC CONSULT NO CHARGE VASC ACCESS

## 2024-02-17 RX ORDER — HYDRALAZINE HYDROCHLORIDE 20 MG/ML
10 INJECTION INTRAMUSCULAR; INTRAVENOUS EVERY 4 HOURS PRN
Status: DISCONTINUED | OUTPATIENT
Start: 2024-02-17 | End: 2024-02-23 | Stop reason: HOSPADM

## 2024-02-17 RX ORDER — SODIUM CHLORIDE 9 MG/ML
INJECTION, SOLUTION INTRAVENOUS CONTINUOUS
Status: DISCONTINUED | OUTPATIENT
Start: 2024-02-17 | End: 2024-02-19

## 2024-02-17 RX ORDER — LISINOPRIL 10 MG/1
10 TABLET ORAL DAILY
Status: DISCONTINUED | OUTPATIENT
Start: 2024-02-18 | End: 2024-02-18

## 2024-02-17 RX ORDER — DEXTROSE MONOHYDRATE 25 G/50ML
25-50 INJECTION, SOLUTION INTRAVENOUS
Status: DISCONTINUED | OUTPATIENT
Start: 2024-02-17 | End: 2024-02-23 | Stop reason: HOSPADM

## 2024-02-17 RX ORDER — NICOTINE POLACRILEX 4 MG
15-30 LOZENGE BUCCAL
Status: DISCONTINUED | OUTPATIENT
Start: 2024-02-17 | End: 2024-02-23 | Stop reason: HOSPADM

## 2024-02-17 RX ORDER — GADOBUTROL 604.72 MG/ML
9 INJECTION INTRAVENOUS ONCE
Status: COMPLETED | OUTPATIENT
Start: 2024-02-17 | End: 2024-02-17

## 2024-02-17 RX ORDER — AMLODIPINE BESYLATE 10 MG/1
10 TABLET ORAL DAILY
Status: DISCONTINUED | OUTPATIENT
Start: 2024-02-17 | End: 2024-02-23 | Stop reason: HOSPADM

## 2024-02-17 RX ORDER — DEXTROSE MONOHYDRATE 100 MG/ML
INJECTION, SOLUTION INTRAVENOUS CONTINUOUS PRN
Status: DISCONTINUED | OUTPATIENT
Start: 2024-02-17 | End: 2024-02-23 | Stop reason: HOSPADM

## 2024-02-17 RX ORDER — NITROGLYCERIN 0.4 MG/1
0.4 TABLET SUBLINGUAL EVERY 5 MIN PRN
Status: DISCONTINUED | OUTPATIENT
Start: 2024-02-17 | End: 2024-02-23 | Stop reason: HOSPADM

## 2024-02-17 RX ORDER — LIDOCAINE 40 MG/G
CREAM TOPICAL
Status: DISCONTINUED | OUTPATIENT
Start: 2024-02-17 | End: 2024-02-23 | Stop reason: HOSPADM

## 2024-02-17 RX ADMIN — VANCOMYCIN HYDROCHLORIDE 2000 MG: 10 INJECTION, POWDER, LYOPHILIZED, FOR SOLUTION INTRAVENOUS at 07:01

## 2024-02-17 RX ADMIN — LEVOTHYROXINE SODIUM 112 MCG: 112 TABLET ORAL at 09:37

## 2024-02-17 RX ADMIN — INSULIN HUMAN 4 UNITS/HR: 1 INJECTION, SOLUTION INTRAVENOUS at 20:07

## 2024-02-17 RX ADMIN — GADOBUTROL 9 ML: 604.72 INJECTION INTRAVENOUS at 17:16

## 2024-02-17 RX ADMIN — LISINOPRIL 5 MG: 2.5 TABLET ORAL at 09:37

## 2024-02-17 RX ADMIN — AMPICILLIN 2 G: 2 INJECTION, POWDER, FOR SOLUTION INTRAMUSCULAR; INTRAVENOUS at 02:36

## 2024-02-17 RX ADMIN — HYDROCORTISONE SODIUM SUCCINATE 100 MG: 100 INJECTION, POWDER, FOR SOLUTION INTRAMUSCULAR; INTRAVENOUS at 23:19

## 2024-02-17 RX ADMIN — CEFTRIAXONE SODIUM 2 G: 2 INJECTION, POWDER, FOR SOLUTION INTRAMUSCULAR; INTRAVENOUS at 05:59

## 2024-02-17 RX ADMIN — DEXAMETHASONE SODIUM PHOSPHATE 10 MG: 4 INJECTION, SOLUTION INTRA-ARTICULAR; INTRALESIONAL; INTRAMUSCULAR; INTRAVENOUS; SOFT TISSUE at 05:59

## 2024-02-17 RX ADMIN — AMLODIPINE BESYLATE 10 MG: 10 TABLET ORAL at 18:56

## 2024-02-17 RX ADMIN — HYDROCORTISONE SODIUM SUCCINATE 100 MG: 100 INJECTION, POWDER, FOR SOLUTION INTRAMUSCULAR; INTRAVENOUS at 17:36

## 2024-02-17 RX ADMIN — AMPICILLIN 2 G: 2 INJECTION, POWDER, FOR SOLUTION INTRAMUSCULAR; INTRAVENOUS at 06:40

## 2024-02-17 RX ADMIN — SODIUM CHLORIDE: 9 INJECTION, SOLUTION INTRAVENOUS at 10:56

## 2024-02-17 RX ADMIN — DEXAMETHASONE SODIUM PHOSPHATE 10 MG: 4 INJECTION, SOLUTION INTRA-ARTICULAR; INTRALESIONAL; INTRAMUSCULAR; INTRAVENOUS; SOFT TISSUE at 00:38

## 2024-02-17 RX ADMIN — SODIUM CHLORIDE: 9 INJECTION, SOLUTION INTRAVENOUS at 23:29

## 2024-02-17 ASSESSMENT — ACTIVITIES OF DAILY LIVING (ADL)
ADLS_ACUITY_SCORE: 45
ADLS_ACUITY_SCORE: 40
ADLS_ACUITY_SCORE: 41
ADLS_ACUITY_SCORE: 41
ADLS_ACUITY_SCORE: 45
ADLS_ACUITY_SCORE: 41
ADLS_ACUITY_SCORE: 45
ADLS_ACUITY_SCORE: 41

## 2024-02-17 NOTE — PLAN OF CARE
PRIMARY Concern: confusion, unsteady , uncontrolled type 2 DM  SAFETY RISK Concerns: Fall risk  Isolation/Type: None  Tests/Procedures: lumbar puncture complete; head CT complete; chest x ray complete; LP complete: cultures pending  Consults? Neuro following; ID consult pending  Where is patient from? Home   Other Important info for NEXT shift: Q4 BG checks; pt's mentation extreme fluctuations from fully awake and convering, to only responsive to painful stimuli  Anticipated DC date & active delays: pending clinical stability  _____________________________________________________  SUMMARY NOTE:  Orientation/Cognitive: Variable; pt has had episodes of complete disorientation w/ lethargy to AOx3  Mobility Level/Assist Equipment: A1-2 GBW to bedside commode  Antibiotics & Plan: IV ceftriaxone, IV Ampicillin, IV Vanco; ID consult pending  Pain Management: PRN tylenol  Tele/VS/O2: VSS on RA ex int hypotension; Tele: SR/SB  ABNL Lab/BG: see chart: CSF smear + gram + cocci preliminarily  Diet: Mod CHO, low fat, low sodium; needs help ordering. On Room service   Bowel/Bladder:  incontinent @ times; Bladder scan PRN  Skin Concerns: Mirza/redness/scaling of LE's   Drains/Devices: R PIV infusing NS @ 100  Patient Stated Goal for Today: rest

## 2024-02-17 NOTE — PLAN OF CARE
Goal Outcome Evaluation:    Pt here with metabolic encephalopathy. Droplet discontinued for suspected bacterial meningitis, no longer suspected. Currently doing [place] workup. Disoriented to time, has become more increasingly confused since this am, MD aware. See flowsheets for assessments, slight aphasia. VSS on RA, ex HTN. Tele SR with AV block. Mod carb diet, thin liquids. Takes pills whole. Up with Ax1 GB/W. Denies pain. Plan still pending, MRI checklist sent down for MRI of head. Scoring yellow on stoplight tool, hit/pushed one of aids today when told she could not sit on side of bed by self with her being such a high fall risk. Daughter and granddaughter here most of day. Would like to be called with MRI results once back.

## 2024-02-17 NOTE — PROGRESS NOTES
[facility]    Medicine Progress Note - Hospitalist Service    Date of Admission:  2/13/2024    Assessment & Plan   Coretta Kolb is a 78 year old female who was admitted on 2/13/2024.      Past medical history significant for HTN, HLP, DM2, Diabetic Neuropathy, Diabetic retinopathy, Obesity, Hypothyroidism, MDD with anxiety, Peripheral edema, Venous insufficiency who was registered to short-stay/observation due to hyperglycemia, pyuria, hypertension and encephalopathy.       Patient presented to the ED as her ophthalmology team was concerned with some increased confusion, as well as daughter.  Work-up in the ED revealed that patient's blood glucose was uncontrolled (glucose of 351 and on recheck at 508).  UA was grossly abnormal and concerning for UTI.  BP was elevated at 193/83.  Patient's daughter and family with some more longstanding concern about patient's safety to continue to live independently with her medication issues.     Per Admission H&P, patient with some increased confusion noted by her ophthalmology team earlier on day of presentation.  Daughter had also reported that she has had concerns about confusion in the past few weeks as well.  Potentially related to uncontrolled diabetes or urinary tract infection.  Given prior concern for patient's ability to safely live independently even 6 months ago before she signed out of her care facility and now medication nonadherence with increasing A1c, suspect this may be a more long-term issue.  Daughter feels the same.  1 fall reported at the end of summer since leaving care facility 6 months ago.    On 2/16 had worsening mentation, lethargy. Had work-up with LP which was negative for bacterial infection, but there was some confusion initially as culture was reported to contain gram + cocci and she was started on broad spectrum abx, IVF and steroids. On 2/17 she had relatively quick transition back to more awake, vibrant mental  status with some remaining confusion. Perhaps she was in early myxedema crisis and was pulled out with initiation of steroid (effectively treating her adrenal insufficiency). As steroids were already given, unable to check AM cortisol.     Acute mental status change, possible early myxedema crisis 2/16  Metabolic encephalopathy  Presumed adrenal insufficiency  *Worsening mentation morning of 2/16.  Nurse paged regarding increased lethargy/somnolence. Patient had almost fell while on the toilet as she was falling asleep. She was unable to remain awake.  Glucose level was within normal limits at time of assessment (188) and BP was normotensive.  Assessed patient and had RRT called.  VBG pH of 7.44, pCO2 of 38, pO2 of 42. Strep negative. CXR negative for infiltrate. Procal 0.13. UA without findings of infection  *See House HO note  -Unclear how long patient was not taking medications and as such will adjust PTA medications to ensure they are not contributing to mentation changes.    *LP 2/16 initially with 2+ gram+ cocci (inaccurate report), corrected to report NO growth. Rest of LP not consistent with bacterial infection. Meningitis panel negative. Started on vancomycin, ceftriaxone, ampicillin, decadron 10mg q6h and had rapid transition of mental status AM of 2/17 (see above)  *2/16 EEG: toxic/metabolic encephalopathy, no seizures.  *2/16 CT head: no acute intracranial abnormality  *2/17 MRI: no stroke. Does show brain atrophy, chronic small vessel ischemic disease. Meningioma (known since 2014) slightly increased in size.  - ID consulted with erroneous LP culture--Abx discontinued, checking west nile serum  -General Neurology appreciated, can follow up as [facility] with [facility] or [facility] regarding cognitive decline and tremors  - CPAP when sleeping.    - blood CX x2 NGTD 2/17  - continue steroids, change to hydrocortisone 100mg q8h, continue until 2/18 evening, then decrease to 50mg q8h  -check AM cortisol  level prior to next dose  - continue thyroid supplement (resumed on 2/14)  -Care management consultation for disposition planning.  -OT consulted for cognitive assessment and disposition planning.              --SLUMS score of 13/30 from 2/15.  --Below baseline/ Significant balance deficits and poor activity tolerance.    --Recommend TCU.    --Likely benefit from Neuropsych referral at discharge.    -Encouraged patient to have her daughter, Janet, present at future appointments; she indicated that her daughter would be her healthcare decision-maker if she were unable to make decisions on her own.     Uncontrolled type 2 diabetes with neuropathy, retinopathy  Hyperglycemia  *Largely secondary to medication nonadherence.  Estimates that she misses her insulin twice per week.  Follows with endocrine through [facility].  Hemoglobin A1c has been increasing on outpatient follow-up; improved to 7.9 during her stay at [facility] and subsequent assisted living facility, now greater than 11.4%  *Patient expressed financial concerns resulting in skipped insulin doses at times.    *2/15: was on Lantus 32 units in AM, humalog 75/25 50u AM and 60u PM  *2/16 AMS and limited PO intake. Humalog mix discontinued, Lantus decreased to 25 units.  -Recommend close follow-up with endocrine and consideration of oral hypoglycemic agents as attempting to wean from Lantus, which is likely the most expensive aspect of her diabetes regimen.    -Patient reports inability to take metformin, but cannot state why this was the case  -Given plan for stress dose steroids as above, start insulin gtt, likely wean back to long/short acting insulins once steroids are tapered  - IMC status while on insulin gtt  -HOLD gabapentin 900 mg BID due to sedation     Pyuria  *Patient with urinary frequency, pyuria on urinalysis.  Symptoms and findings could potentially be secondary to uncontrolled diabetes as well.  *Urine culture with mixed urogenital luisa.  "   -Stopped ceftriaxone 2/15.       Hypothyroidism  Early myxedema crisis (see above)  Adrenal insufficiency (see above)  -TSH elevated at 8.11 with low Free T4 of 0.88.    - see above with treatment with stress dose steroids, continuing thyroid supplement 112mcg daily     Depression  *Per Pharmacy med rec, patient no longer taking Cymbalta.       Hypotension  Hypertension  PTA amlodipine 10 mg/d and lisinopril 30 mg/d  - resume PTA amlodipine 10mg  - PTA lisinopril decreased to 10mg daily   - PRN hydralazine for SBP>180     HLP  -Hold PTA Crestor 20 mg/d.  Resume at discharge.       Peripheral edema  Venous insufficiency   - Hold PTA lasix 20 mg M/W/F and resume at discharge.       Obesity   Body mass index is 41.57 kg/m .  Increase in all-cause morbidity and mortality.   - Follow up with PCP regarding ongoing management.             Diet: Combination Diet Moderate Consistent Carb (60 g CHO per Meal) Diet; Low Saturated Fat Na <2400mg Diet  Room Service    DVT Prophylaxis: Pneumatic Compression Devices  Rodriguez Catheter: Not present  Lines: None     Cardiac Monitoring: ACTIVE order. Indication: Acute worsening encephalopathy  Code Status: Full Code      Clinically Significant Risk Factors              # Hypoalbuminemia: Lowest albumin = 3.1 g/dL at 2/16/2024 11:58 AM, will monitor as appropriate            # Severe Obesity: Estimated body mass index is 41.57 kg/m  as calculated from the following:    Height as of this encounter: 1.549 m (5' 1\").    Weight as of this encounter: 99.8 kg (220 lb)., PRESENT ON ADMISSION     # Financial/Environmental Concerns: other (see comments) (pt denies issues affording medication but daughter states pt told the doctor in ED she doesn't take her insulin everyday due to cost)         Disposition Plan      Expected Discharge Date: 02/20/2024        Discharge Comments: UTI+   IV abx.   UC pending.   Parameters now on b/p meds            Mayte Estrada, DO  Hospitalist Service  [facility] " [facility]  Securely message with Amado (more info)  Text page via [facility] Paging/Directory   ______________________________________________________________________    Interval History   Patient seen and examined. She is doing much better than day prior, but remains confused. Has difficulty with speech, word finding. Is hyper-verbal at times (likely steroid effect). Oriented almost completely--gets feb and year, but not day. Updated daughter and granddaughter at bedside. Called daughter in evening to update regarding MRI results and ongoing plan of care.    Physical Exam   Vital Signs: Temp: 97.4  F (36.3  C) Temp src: Oral BP: (!) 147/77 Pulse: 67   Resp: 16 SpO2: 97 % O2 Device: None (Room air)    Weight: 220 lbs 0 oz    Constitutional: Awake, alert, cooperative, no apparent distress  Respiratory: Clear to auscultation bilaterally, no crackles or wheezing  Cardiovascular: Regular rate and rhythm, normal S1 and S2, and no murmur noted  GI: Normal bowel sounds, soft, non-distended, non-tender  Skin/Integumen: No rashes, no cyanosis, no edema  Neuro:  Strength 5/5 BUE and BLE. Sensation intact. Finger to nose and heel to shin intact. Tongue midline. Face symmetric. Visual fields intact. Some aphasia and dysarthria at times.  Oriented to self, place, date (month/year), why she is in hospital (confusion)    Medical Decision Making       75 MINUTES SPENT BY ME on the date of service doing chart review, history, exam, documentation & further activities per the note.      Data     I have personally reviewed the following data over the past 24 hrs:    9.9  \   14.0   / 202     136 103 19.9 /  313 (H)   4.0 21 (L) 0.73 \     ALT: 11 AST: 21 AP: 125 TBILI: 0.2   ALB: 3.2 (L) TOT PROTEIN: 6.7 LIPASE: N/A       Imaging results reviewed over the past 24 hrs:   Recent Results (from the past 24 hour(s))   MR Brain w/o & w Contrast    Narrative    EXAM: MR BRAIN W/O and W CONTRAST  LOCATION: [facility]  DATE: 2/17/2024    INDICATION: Confusion, slurred speech  COMPARISON:  MRI brain 10/27/2014.  CONTRAST: 9 mL Gadavist  TECHNIQUE: Routine multiplanar multisequence head MRI without and with intravenous contrast.    FINDINGS:  INTRACRANIAL CONTENTS: No acute or subacute infarct. No acute hemorrhage or extra-axial fluid collections. 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 Patchy   nonspecific T2/FLAIR hyperintensities within the cerebral white matter and christopher most consistent with mild to moderate chronic microvascular ischemic change. Mild to moderate generalized cerebral atrophy. No hydrocephalus. Normal position of the   cerebellar tonsils.    SELLA: Empty sella morphology with flattening of the pituitary gland along the sellar floor.    OSSEOUS STRUCTURES/SOFT TISSUES: Normal marrow signal. The major intracranial vascular flow voids are maintained.     ORBITS: Prior bilateral cataract surgery. Visualized portions of the orbits are otherwise unremarkable.     SINUSES/MASTOIDS: No paranasal sinus mucosal disease. Scattered fluid/membrane thickening in the right mastoid air cells. No apparent mass in the posterior nasopharynx or skull base.       Impression    IMPRESSION:  1.  No acute intracranial process.  2.  Generalized brain atrophy and presumed microvascular ischemic changes as detailed above.  3.  Slight increased size of the small left petrous meningioma since 2014.

## 2024-02-17 NOTE — PROGRESS NOTES
UPDATE/ADDENDUM:    Paged regarding critical lab regarding CSF with 2+ gram positive cocci results for LP completed earlier today.      - Start empiric antibiotics:   --Pharmacy consult to dose Vancomycin.     --IV Ceftriaxone 2 g every 12 hours.     --IV Ampicillin 2 g every 4 hours.    - IV Decadron 10 mg every 6 hours.    - Stop D5 NS and switch to normal saline 100 ml/hr.    - ID consult requested.    - [place] [facility] [place] set as well as transfer orders placed).  **General Neurology was updated.    **Discussed with Dr. Mendez and House HO Marlon Underwood NP.    **Updated floor nurse regarding changes and plan to transfer.    **Called patient's daughter Janet and updated her regarding results, antibiotic treatment, and transfer [place] [facility].      Jersey Tyler PA-C  [facility]  Securely message with the Vocera Web Console (learn more here)  Text page via [facility] Paging/Directory

## 2024-02-17 NOTE — PROGRESS NOTES
Care Management Follow Up    Length of Stay (days): 2    Expected Discharge Date: 02/20/2024     Concerns to be Addressed: discharge planning   Patient plan of care discussed at interdisciplinary rounds: Yes    Anticipated Discharge Disposition: [facility]     Referrals Placed by CM/EVENS:  post acute facilities   Private pay costs discussed: Not applicable    Additional Information:  Per hospitalist, patient not medically ready to discharge. EVENS called Tisha at [facility] to let her know discharge will not take place tomorrow. She said if things change, it can still happen tomorrow by calling the nurses station at [phone]. She asks that either way, EVENS contact that number tomorrow to let them know if she is coming or not.     Carla Mcconnell, MEERA, LGSW   Social Work   [facility]

## 2024-02-17 NOTE — PROGRESS NOTES
[facility]  Neurology Daily Note      Admission Date:2/13/2024   Date of service: 02/17/2024   Hospital Day: 5                                                   Assessment and Plan:   #. Encephalopathy-improving   Patient was admitted for more confusion when seen in the outpatient setting.  Workup has shown hyperglycemia, but negative urine culture and viral studies.  Her TSH on admission was notable for an elevation consistent with hypothyroidism.  This may also be contributing to her cognitive decline and confusion in addition to her hyperglycemia.  She does have a slight bump in white count yesterday, given her continued confusion and infectious workup was started.  She did get an LP that showed gram-positive cocci but this was an error.  This morning she did appear improved and was alert but with occasional confusion.  Her confusion could be secondary to toxic/metabolic abnormalities and delirium.  EEG study was consistent with encephalopathy.  CT head on arrival negative for any acute abnormality, repeat CT was negative for any acute changes.    -Continue metabolic corrections per primary team and both regards to glucose as well as hypothyroidism.   -EEG consistent with encephalopathy, no seizures or epileptiform discharges.   -CT head unremarkable on arrival aside from chronic microvascular ischemic change, unchanged CT head on 2/16  -Given the bump in white count, consider repeat infectious workup (procal normal)  -LP initially normal for gram-positive cocci, this was addended and was negative.  She did have 10 WBCs but also 200 RBCs, although traumatic amount of red blood cells does not explain her WBCs.  ID is following and is on West Nile and serology.  Meningitis/encephalitis panel negative.   -Encourage use of CPAP machine during sleep, including when the patient sleeps during the day.   -MRI brain negative for stroke, did show a small petrous meningioma that was slightly increased from 2014.  Felt this to be incidental and not a reason for her encephalopathy.   -Patient requires outpatient follow-up for her concerns of cognitive decline and tremors, potentially could have parkinsonism but would recommend an outpatient evaluation.  Patient would like to follow-up with [facility] though happy to see her at [facility] for evaluation.  Placed referral on discharge.     #. PT/OT/Speech  --continue evaluations  #.Nutrition     --Per speech therapy evaluation     General Neurology service will sign off as no additional neurologic evaluation or management from our service is required at this time.  Please contact General Neurology service if questions related to neurologic status or follow up needed during this hospitalization.        Interval History:   CSF was initially notable for gram-positive cocci, but addendum this morning reports that it was an error and there is no organisms found.  Culture pending.  Meningitis/encephalitis panel was negative, ID was consulted and have added on West Nile virus.  At bedside today patient is alert and communicative, she reports feeling better and knows she is in the hospital.  Has been aggressive at times per daughter.  Daughter reports that she has had confusion and tremors for years but has not thought to go he gets checked out.         Medications:   Scheduled Meds:    insulin aspart  1-10 Units Subcutaneous TID AC     insulin aspart  1-7 Units Subcutaneous At Bedtime     insulin glargine  25 Units Subcutaneous QAM AC     levothyroxine  112 mcg Oral Daily     lisinopril  5 mg Oral Daily     sodium chloride (PF)  3 mL Intracatheter Q8H     PRN Meds: acetaminophen **OR** acetaminophen, bisacodyl, glucose **OR** dextrose **OR** glucagon, lidocaine 4%, lidocaine (buffered or not buffered), melatonin, naloxone **OR** naloxone **OR** naloxone **OR** naloxone, ondansetron **OR** ondansetron, polyethylene glycol, prochlorperazine **OR** prochlorperazine **OR** prochlorperazine,  senna-docusate **OR** senna-docusate, sodium chloride (PF), traMADol        Physical Exam:   Vitals: Temp: 97.4  F (36.3  C) Temp src: Oral BP: (!) 147/77 Pulse: 67   Resp: 16 SpO2: 97 % O2 Device: None (Room air)    Vital Signs with Ranges: Temp:  [97.3  F (36.3  C)-100.3  F (37.9  C)] 97.4  F (36.3  C)  Pulse:  [56-69] 67  Resp:  [10-22] 16  BP: ()/(49-77) 147/77  SpO2:  [91 %-97 %] 97 %    General Appearance:  No acute distress  Neuro:           Mental Status: The patient is alert sitting at the side of the bed.  She is oriented to being in the hospital at [facility].  Able to follow simple commands and spoke fluently though did have an episode of tripping over some words.  Able to follow simple commands.   Cranial Nerves: Visual fields full to confrontation, no visual extinction.  Pupils equal round and reactive to light, no APD. Pursuits without saccadic intrusions and full in all directions. No gaze deviation, dysconjugate gaze, or gaze palsy, no nystagmus, no ptosis. Facial sensation intact in all distributions of CN V. No facial asymmetry at rest or with activation on smile or eyebrow lift. Symmetric palate elevation, tongue midline with full lateral movements. No dysarthria observed.   Motor: Tone is normal in all four extremities without fasciculations, atrophy or myoclonus.  Patient does have a mild tremor bilaterally.  Muscle Strength: The strength was 5/5 in upper and lower extremities bilaterally.    Reflexes: The reflexes are are trace in the upper extremities and absent in the lower extremities.  Toes are down-going. No clonus.     Sensory: Intact to light touch in the upper and lower extremities, decreased pinprick in the lower extremities from mid shins down.    Cerebellar: The cerebellar examination is normal to finger to nose test.    Musculoskeletal system and Gait: Deferred    Extremities: No clubbing, no cyanosis, no edema       Data:   ROUTINE IP LABS (Last 3results)  CBC RESULTS:      Recent Labs   Lab Test 02/17/24  0852 02/16/24  0605 02/14/24  0627   WBC 9.9 11.2*  11.2* 9.7   RBC 4.78 4.84  4.84 4.73   HGB 14.0 14.3  14.3 14.0   HCT 42.1 42.3  42.3 41.3    230  230 143*     Basic Metabolic Panel:  Recent Labs   Lab Test 02/17/24  0852 02/17/24  0753 02/17/24  0453 02/16/24  0719 02/16/24  0605 02/14/24  0756 02/14/24 0627     --   --   --  135  --  135   POTASSIUM 4.0  --   --   --  3.5  --  4.5   CHLORIDE 103  --   --   --  100  --  100   CO2 21*  --   --   --  25  --  24   BUN 19.9  --   --   --  20.8  --  11.5   CR 0.73  --   --   --  0.89  --  0.68   * 261* 284*   < > 135*   < > 294*   KATERIN 8.6*  --   --   --  9.2  --  9.3    < > = values in this interval not displayed.     Liver panel:  Recent Labs   Lab Test 02/17/24  0852 02/16/24  1158 02/13/24 2012 11/30/22  0920   PROTTOTAL 6.7 5.9* 7.1 7.5   ALBUMIN 3.2* 3.1* 3.6 3.0*   BILITOTAL 0.2 0.2 0.3 0.5   ALKPHOS 125 108 147 114   AST 21 23 16 35   ALT 11 9 12 27     Thyroid Panel:  Recent Labs   Lab Test 02/13/24 2012 03/14/23  1130   TSH 8.11* 3.25   T4 0.88*  --       Vitamin B12: No lab results found.   Ammonia: No lab results found.    EEG 2/16/24  This electroencephalogram in the confused state is abnormal due to the presence of moderately diffuse slowing of the background.  This is consistent with generalized encephalopathy which is nonspecific in etiology.  Diagnostic possibilities would include toxic metabolic encephalopathy.  No epileptic abnormalities were noted during recording without evidence of nonconvulsive seizures.  At times mild slowing was observed in the right frontal region which is most consistent with lead/movement artifact; however, clinical correlation advised for disorder localized in the right lateral frontal region.        IMAGING:   Independent interpretation of the following studies by myself as part of today's encounter.   CT head 2/16/24:   --Personally reviewed, mild  hypodensities noted consistent with mild presumed chronic small vessel ischemic changes. Mild atrophy, though appears consistent with patient's age.  No signs of bleeds or acute ischemic changes.  Stable compared to CT from 2/13        TIME     50 minutes Evaluation/management time     Sybil Plaza M.D.  Neurologist  [facility] Neurology  Office [phone]

## 2024-02-17 NOTE — PHARMACY-VANCOMYCIN DOSING SERVICE
Pharmacy Vancomycin Initial Note  Date of Service 2024  Patient's  1945  78 year old, female    Indication: Meningitis    Current estimated CrCl = Estimated Creatinine Clearance: 58.2 mL/min (based on SCr of 0.89 mg/dL).    Creatinine for last 3 days  2024:  6:05 AM Creatinine 0.89 mg/dL    Recent Vancomycin Level(s) for last 3 days  No results found for requested labs within last 3 days.      Vancomycin IV Administrations (past 72 hours)                     vancomycin (VANCOCIN) 2,000 mg in 0.9% NaCl 500 mL intermittent infusion (mg) 2,000 mg New Bag 24 0701     2,000 mg New Bag 24 2030                    Nephrotoxins and other renal medications (From now, onward)      Start     Dose/Rate Route Frequency Ordered Stop    24 0800  lisinopril (ZESTRIL) tablet 5 mg        Note to Pharmacy: PTA Sig:Take 30 mg by mouth daily      5 mg Oral DAILY 24 0914      24 1800  vancomycin (VANCOCIN) 2,000 mg in 0.9% NaCl 500 mL intermittent infusion         2,000 mg  over 2 Hours Intravenous EVERY 12 HOURS 24 1753      24 1730  ampicillin (OMNIPEN) 2 g vial to attach to  mL bag         2 g  over 15 Minutes Intravenous EVERY 4 HOURS 24 1731              Contrast Orders - past 72 hours (72h ago, onward)      None                  Plan:  Vancomycin initiated 2/16 PM. Patient started on IV vancomycin 2000 mg q12h.   Vancomycin monitoring method: Trough (Method 1 = dosing nomogram)  Vancomycin therapeutic monitoring goal: 15-20 mg/L  Pharmacy will check vancomycin levels as appropriate in 1-3 Days.    Serum creatinine levels will be ordered daily for the first week of therapy and at least twice weekly for subsequent weeks.      VANDANA ADAME [facility]

## 2024-02-17 NOTE — PROGRESS NOTES
Reason for Admission: Metabolic encephalopathy and hyperglycemia, rule-out bacterial meningitis   Cognitive/Mentation: A/Ox 3, disoriented to time- forgetful  Neuros/CMS: Stable with generalized weakness  VS: VSS on RA  Tele: SR w/ 1st degree AV block  GI: BS clear, Continent.  : Continent.  Pulmonary: LS clear.  Pain: Denies throughout shift.   Drains/Lines: R PIV, infusing NS @ 100 ml/hr  Skin: BLE peeling and scaling   Activity: Assist x 1-2 with GB/W.  Diet: Mod carb with thin liquids. Takes pills whole.   Therapies recs: TCU  Discharge: Pending medical readiness   Aggression Stoplight Tool: Green  End of shift summary: Pt transferred from short-stay this shift, no changes. Droplet precautions maintained for suspected meningitis- results pending. Plan for ID consult.

## 2024-02-17 NOTE — CONSULTS
Infectious Disease Consultation     Date of Admission:  2/13/2024  Date of Consult: 02/17/24      Assessment:  78YF who has been admitted with encephalopathy without fever/leukocytosis or other signs of infection. CSF studies are not consistent with bacterial meningitis. Encephalopathy may have been related to hyperglycemia and hypothyroidism and seems to be improving clinically    -Metabolic encephalopathy  -Uncontrolled diabetes HbA1c 11.4%  -Hypothyroidism , FT4 is 0.88  -Chronic medical condition - HTN, depression, edema, venous insufficiency, obesity    Recommendations  CSF was a bloody tap with only 10 WBC with monocytic predominance, not consistent with bacterial meningitis  CSF meningitis panel and cxs are negative  Discontinue Vancomycin, Ceftriaxone and Ampicillin  Discontinue droplet isolation  Added west nile serum and CSF serology  Could consider MRI brain to assess for stroke given uncontrolled diabetes, but would defer to Neurology    Recommendations were discussed with the hospitalist service, ID will sign off  María Elena Mccrary MD    Reason for Consult   Reason for consult: I was asked to evaluate this patient for meningitis.    Primary Care Physician   Myra Tesfaye    Chief Complaint   Mental status changes    History is obtained from the patient and medical records    History of Present Illness   Coretta Kolb is a 78 year old female with uncontrolled diabetes who was admitted due to confusion which was noted by her ophthalmologist. She has no history of fever, chills or other focal complaints, though she did report a headache.  She has been seen by Neurology and  CSF studies were checked which were consistent with bacterial meningitis.   She was hyperglycemic on admission and had a very low FT4. She has been initiated on vancomycin, ampicillin and ceftriaxone and ID has been asked to assist with further management    She is better today, EEG did not show  seizure activity.       Past Medical History   I have reviewed this patient's medical history and updated it with pertinent information if needed.   Past Medical History:   Diagnosis Date    Diabetes (H)     Hypertension     Hypothyroid        Past Surgical History   I have reviewed this patient's surgical history and updated it with pertinent information if needed.  Past Surgical History:   Procedure Laterality Date    ANESTHESIA OUT OF OR MRI N/A 1/11/2023    Procedure: ANESTHESIA OUT OF OR LUMBAR MRI;  Surgeon: GENERIC ANESTHESIA PROVIDER;  Location: [facility] OR    ORTHOPEDIC SURGERY         Prior to Admission Medications   Prior to Admission Medications   Prescriptions Last Dose Informant Patient Reported? Taking?   acetaminophen (TYLENOL) 325 MG tablet Past Week  No Yes   Sig: Take 3 tablets (975 mg) by mouth every 8 hours   Patient taking differently: Take 975 mg by mouth every 8 hours as needed   amLODIPine (NORVASC) 10 MG tablet Past Month Self Yes Yes   Sig: Take 10 mg by mouth daily   furosemide (LASIX) 20 MG tablet Past Month  Yes Yes   Sig: Take 1 Tablet (20 mg) by mouth every Monday, Wednesday,Friday. In the morning   gabapentin (NEURONTIN) 300 MG capsule Past Month Self Yes Yes   Sig: Take 900 mg by mouth 2 times daily   insulin glargine (LANTUS VIAL) 100 UNIT/ML soln Past Month Self Yes Yes   Sig: Inject 32 Units Subcutaneous every morning   insulin lispro (HUMALOG KWIKPEN) 100 UNIT/ML (1 unit dial) KWIKPEN Not Taking  No No   Sig: Inject insulin three times daily before meals and bedtime:  If Pre-meal Glucose  140 -164 give 1 unit  165 -189 give 2 units  190 -214 give 3 units  215 -239 give 4 units  240 -264 give 5 units  265 -289 give 6 units  290 -314 give 7 units  315 -339 give 8 units  340+ give 9 units    If Bedtime Glucose  200 -214 give 1 unit  215 -264 give 2 units  265 -314 give 3 units  315+ give 4 units   Patient not taking: Reported on 2/14/2024   insulin lispro protamine-insulin lispro  (HUMALOG MIX 75/25 KWIKPEN) (75-25) 100 UNIT/ML pen Past Month Self Yes Yes   Si units with breakfast in the morning and 60 units with dinner in the evening   levalbuterol (XOPENEX HFA) 45 MCG/ACT inhaler   Yes Yes   Sig: Inhale 2 puffs into the lungs every 4 hours as needed   levothyroxine (SYNTHROID/LEVOTHROID) 112 MCG tablet Past Month Self Yes Yes   Sig: Take 112 mcg by mouth daily   lisinopril (ZESTRIL) 30 MG tablet Past Month Self Yes Yes   Sig: Take 30 mg by mouth daily   rosuvastatin (CRESTOR) 20 MG tablet Past Month Self Yes Yes   Sig: Take 20 mg by mouth at bedtime   terbinafine (LAMISIL) 250 MG tablet More than a month Self Yes Yes   Sig: Take 250 mg by mouth Daily for one week each month   triamcinolone (KENALOG) 0.025 % external ointment Past Month Self Yes Yes   Sig: Apply topically 2 times daily To lower legs      Facility-Administered Medications: None     Allergies   Allergies   Allergen Reactions    Hydrocodone-Acetaminophen      constipated       Immunization History   Immunization History   Administered Date(s) Administered    COVID-19 Bivalent 12+ (Pfizer) 2022, 2023    COVID-19 Monovalent 12+ (Pfizer ) 2022    COVID-19 Monovalent 18+ (Moderna) 2022    COVID-19 Vaccine (Alis) 2021       Social History   I have reviewed this patient's social history and updated it with pertinent information if needed. Coretta DICKERSON Kennedi  reports that she has never smoked. She has never been exposed to tobacco smoke. She does not have any smokeless tobacco history on file. She reports current alcohol use. She reports that she does not use drugs.    Family History   I have reviewed this patient's family history and updated it with pertinent information if needed.   No family history on file.    Review of Systems   The 10 point Review of Systems is as per HPI    Physical Exam   Temp: 97.4  F (36.3  C) Temp src: Oral BP: (!) 147/77 Pulse: 67   Resp: 16 SpO2: 97 % O2 Device:  None (Room air)    Vital Signs with Ranges  Temp:  [97.3  F (36.3  C)-100.3  F (37.9  C)] 97.4  F (36.3  C)  Pulse:  [56-69] 67  Resp:  [10-22] 16  BP: ()/(49-77) 147/77  SpO2:  [91 %-97 %] 97 %  220 lbs 0 oz  Body mass index is 41.57 kg/m .    GENERAL APPEARANCE:  awake, less confused per family today  EYES: Eyes grossly normal to inspection  NECK: supple  RESP: non labored breathing  ABDOMEN: soft  MS: edema  SKIN: no suspicious lesions or rashes        Data   All laboratory data reviewed  Component      Latest Ref Rn 2/13/2024  8:12 PM   T4 Free      0.90 - 1.70 ng/dL 0.88 (L)    Hemoglobin A1C      <5.7 % 11.4 (H)         Component      Latest Ref Rn 2/16/2024  3:12 PM   Tube Number 2    Color CSF      Colorless  Colorless    Appearance CSF      Clear  Clear    Total Nucleated Cells      0 - 5 /uL 10 (H)    RBC CSF      0 - 2 /uL 420 (H)    % Neutrophils CSF      % 21    % Lymphocytes CSF      % 34    % Mono/Macros CSF      % 45    Glucose CSF      40 - 70 mg/dL 72 (H)    Protein total CSF      15.0 - 45.0 mg/dL 56.7 (H)       Component      Latest Ref Rn 2/16/2024  3:12 PM   Escherichia coli K1      Negative  Negative    Haemophilus influenzae      Negative  Negative    Listeria monocytogenes      Negative  Negative    Neisseria meningitidis      Negative  Negative    Streptococcus agalactiae (GBS)      Negative  Negative    Streptococcus pneumoniae      Negative  Negative    Cytomegalovirus      Negative  Negative    Enterovirus by PCR      Negative  Negative    Herpes Simplex Virus 1      Negative  Negative    Herpes Simplex Virus 2      Negative  Negative    Human Herpes Virus 6      Negative  Negative    Human Parechovirus      Negative  Negative    Varicella Zoster Virus      Negative  Negative    Cryptococcus neoformans/gattii      Negative  Negative      Microbiology  2/16 blood cx and csf cx with no growth    Imaging  CT SCAN OF THE HEAD WITHOUT CONTRAST February 16, 2024 11:16 AM      HISTORY:  Altered mental status, increasing lethargy.     TECHNIQUE: Axial images of the head and coronal reformations without  IV contrast material. Radiation dose for this scan was reduced using  automated exposure control, adjustment of the mA and/or kV according  to patient size, or iterative reconstruction technique.     COMPARISON: Head CT 2/13/2024.     FINDINGS: No evidence of hemorrhage, mass, or hydrocephalus.  Generalized parenchymal volume loss with background of nonspecific  white matter hypoattenuation likely represent chronic small vessel  ischemic change. Partially empty sella turcica, presumably incidental.  No acute osseous abnormality.                                                                      IMPRESSION: No acute intracranial abnormality.

## 2024-02-17 NOTE — PROVIDER NOTIFICATION
"Dr Estrada was notified by august this shift     At 1200: bp is high above 180, can you order a PRN? She is asymptomatic, fluids were just lowered to 75mL/hr an hour ago. HR is currently 69. INTERVENTION TO STOP FLUID, PRN HYDRALAZINE AVAILABLE.     at 1345: \"FYI: pt seems to be more confused than this am, some slight aphasia too. All other neuros are okay. Some aggression and agitation with the confusion too. I know we are getting an MRI to rule out stroke, and there are no acute changes currently, but wanted to let you know.\" Acknowledged message     "

## 2024-02-17 NOTE — PROVIDER NOTIFICATION
"MD Notification    Notified Person: MD    Notified Person Name: Wild Rush MD    Notification Date/Time: 2/16/24 at 2009    Notification Interaction: [employer] Messaging    Purpose of Notification: \"FYI: Call received from the infectious disease lab. The previous result of the CSF taken at 1512 showing 2+ gram positive cocci is now showing no organisms, 2+ WBCs's.\"    Orders Received: Per Dr. Rush, continue with current plan of care, awaiting culture results.  Bedside RN informed.  "

## 2024-02-18 LAB
ANION GAP SERPL CALCULATED.3IONS-SCNC: 10 MMOL/L (ref 7–15)
BASE EXCESS BLDV CALC-SCNC: 0.7 MMOL/L (ref -3–3)
BUN SERPL-MCNC: 20.1 MG/DL (ref 8–23)
CALCIUM SERPL-MCNC: 9.2 MG/DL (ref 8.8–10.2)
CHLORIDE SERPL-SCNC: 108 MMOL/L (ref 98–107)
CORTIS SERPL-MCNC: 94.2 UG/DL
CREAT SERPL-MCNC: 0.69 MG/DL (ref 0.51–0.95)
DEPRECATED HCO3 PLAS-SCNC: 23 MMOL/L (ref 22–29)
EGFRCR SERPLBLD CKD-EPI 2021: 89 ML/MIN/1.73M2
ERYTHROCYTE [DISTWIDTH] IN BLOOD BY AUTOMATED COUNT: 13.4 % (ref 10–15)
GLUCOSE BLDC GLUCOMTR-MCNC: 103 MG/DL (ref 70–99)
GLUCOSE BLDC GLUCOMTR-MCNC: 108 MG/DL (ref 70–99)
GLUCOSE BLDC GLUCOMTR-MCNC: 110 MG/DL (ref 70–99)
GLUCOSE BLDC GLUCOMTR-MCNC: 115 MG/DL (ref 70–99)
GLUCOSE BLDC GLUCOMTR-MCNC: 120 MG/DL (ref 70–99)
GLUCOSE BLDC GLUCOMTR-MCNC: 121 MG/DL (ref 70–99)
GLUCOSE BLDC GLUCOMTR-MCNC: 121 MG/DL (ref 70–99)
GLUCOSE BLDC GLUCOMTR-MCNC: 125 MG/DL (ref 70–99)
GLUCOSE BLDC GLUCOMTR-MCNC: 127 MG/DL (ref 70–99)
GLUCOSE BLDC GLUCOMTR-MCNC: 132 MG/DL (ref 70–99)
GLUCOSE BLDC GLUCOMTR-MCNC: 148 MG/DL (ref 70–99)
GLUCOSE BLDC GLUCOMTR-MCNC: 155 MG/DL (ref 70–99)
GLUCOSE BLDC GLUCOMTR-MCNC: 158 MG/DL (ref 70–99)
GLUCOSE BLDC GLUCOMTR-MCNC: 163 MG/DL (ref 70–99)
GLUCOSE BLDC GLUCOMTR-MCNC: 177 MG/DL (ref 70–99)
GLUCOSE BLDC GLUCOMTR-MCNC: 178 MG/DL (ref 70–99)
GLUCOSE BLDC GLUCOMTR-MCNC: 198 MG/DL (ref 70–99)
GLUCOSE BLDC GLUCOMTR-MCNC: 209 MG/DL (ref 70–99)
GLUCOSE BLDC GLUCOMTR-MCNC: 234 MG/DL (ref 70–99)
GLUCOSE BLDC GLUCOMTR-MCNC: 269 MG/DL (ref 70–99)
GLUCOSE BLDC GLUCOMTR-MCNC: 95 MG/DL (ref 70–99)
GLUCOSE SERPL-MCNC: 119 MG/DL (ref 70–99)
HCO3 BLDV-SCNC: 25 MMOL/L (ref 21–28)
HCT VFR BLD AUTO: 42.6 % (ref 35–47)
HGB BLD-MCNC: 14.5 G/DL (ref 11.7–15.7)
MAGNESIUM SERPL-MCNC: 2.4 MG/DL (ref 1.7–2.3)
MCH RBC QN AUTO: 29.6 PG (ref 26.5–33)
MCHC RBC AUTO-ENTMCNC: 34 G/DL (ref 31.5–36.5)
MCV RBC AUTO: 87 FL (ref 78–100)
O2/TOTAL GAS SETTING VFR VENT: 0 %
OXYHGB MFR BLDV: 72 % (ref 70–75)
PCO2 BLDV: 38 MM HG (ref 40–50)
PH BLDV: 7.43 [PH] (ref 7.32–7.43)
PHOSPHATE SERPL-MCNC: 2.4 MG/DL (ref 2.5–4.5)
PLATELET # BLD AUTO: 260 10E3/UL (ref 150–450)
PO2 BLDV: 38 MM HG (ref 25–47)
POTASSIUM SERPL-SCNC: 3.9 MMOL/L (ref 3.4–5.3)
RBC # BLD AUTO: 4.9 10E6/UL (ref 3.8–5.2)
SAO2 % BLDV: 73.1 % (ref 70–75)
SODIUM SERPL-SCNC: 141 MMOL/L (ref 135–145)
WBC # BLD AUTO: 16.3 10E3/UL (ref 4–11)

## 2024-02-18 PROCEDURE — 84100 ASSAY OF PHOSPHORUS: CPT | Performed by: HOSPITALIST

## 2024-02-18 PROCEDURE — 99233 SBSQ HOSP IP/OBS HIGH 50: CPT | Performed by: HOSPITALIST

## 2024-02-18 PROCEDURE — 85027 COMPLETE CBC AUTOMATED: CPT | Performed by: HOSPITALIST

## 2024-02-18 PROCEDURE — 250N000013 HC RX MED GY IP 250 OP 250 PS 637: Performed by: HOSPITALIST

## 2024-02-18 PROCEDURE — 82533 TOTAL CORTISOL: CPT | Performed by: HOSPITALIST

## 2024-02-18 PROCEDURE — 250N000009 HC RX 250: Performed by: HOSPITALIST

## 2024-02-18 PROCEDURE — 82805 BLOOD GASES W/O2 SATURATION: CPT | Performed by: HOSPITALIST

## 2024-02-18 PROCEDURE — 250N000011 HC RX IP 250 OP 636: Performed by: HOSPITALIST

## 2024-02-18 PROCEDURE — 120N000013 HC R&B IMCU

## 2024-02-18 PROCEDURE — 36415 COLL VENOUS BLD VENIPUNCTURE: CPT | Performed by: HOSPITALIST

## 2024-02-18 PROCEDURE — 83735 ASSAY OF MAGNESIUM: CPT | Performed by: HOSPITALIST

## 2024-02-18 PROCEDURE — 80048 BASIC METABOLIC PNL TOTAL CA: CPT | Performed by: HOSPITALIST

## 2024-02-18 RX ORDER — AMOXICILLIN 250 MG
1 CAPSULE ORAL 2 TIMES DAILY
Status: DISCONTINUED | OUTPATIENT
Start: 2024-02-18 | End: 2024-02-20

## 2024-02-18 RX ORDER — GABAPENTIN 100 MG/1
100 CAPSULE ORAL 2 TIMES DAILY
Status: DISCONTINUED | OUTPATIENT
Start: 2024-02-18 | End: 2024-02-23 | Stop reason: HOSPADM

## 2024-02-18 RX ORDER — LISINOPRIL 10 MG/1
10 TABLET ORAL ONCE
Status: COMPLETED | OUTPATIENT
Start: 2024-02-18 | End: 2024-02-18

## 2024-02-18 RX ADMIN — LEVOTHYROXINE SODIUM 112 MCG: 112 TABLET ORAL at 08:51

## 2024-02-18 RX ADMIN — HYDROCORTISONE SODIUM SUCCINATE 100 MG: 100 INJECTION, POWDER, FOR SOLUTION INTRAMUSCULAR; INTRAVENOUS at 22:44

## 2024-02-18 RX ADMIN — SENNOSIDES AND DOCUSATE SODIUM 1 TABLET: 50; 8.6 TABLET ORAL at 10:58

## 2024-02-18 RX ADMIN — GABAPENTIN 100 MG: 100 CAPSULE ORAL at 20:59

## 2024-02-18 RX ADMIN — LISINOPRIL 10 MG: 10 TABLET ORAL at 09:49

## 2024-02-18 RX ADMIN — POTASSIUM & SODIUM PHOSPHATES POWDER PACK 280-160-250 MG 1 PACKET: 280-160-250 PACK at 10:58

## 2024-02-18 RX ADMIN — AMLODIPINE BESYLATE 10 MG: 10 TABLET ORAL at 08:51

## 2024-02-18 RX ADMIN — SENNOSIDES AND DOCUSATE SODIUM 1 TABLET: 50; 8.6 TABLET ORAL at 20:59

## 2024-02-18 RX ADMIN — INSULIN HUMAN 2 UNITS/HR: 1 INJECTION, SOLUTION INTRAVENOUS at 13:58

## 2024-02-18 RX ADMIN — POTASSIUM & SODIUM PHOSPHATES POWDER PACK 280-160-250 MG 1 PACKET: 280-160-250 PACK at 18:46

## 2024-02-18 RX ADMIN — LISINOPRIL 10 MG: 10 TABLET ORAL at 08:51

## 2024-02-18 RX ADMIN — GABAPENTIN 100 MG: 100 CAPSULE ORAL at 10:58

## 2024-02-18 RX ADMIN — HYDROCORTISONE SODIUM SUCCINATE 100 MG: 100 INJECTION, POWDER, FOR SOLUTION INTRAMUSCULAR; INTRAVENOUS at 07:29

## 2024-02-18 RX ADMIN — HYDROCORTISONE SODIUM SUCCINATE 100 MG: 100 INJECTION, POWDER, FOR SOLUTION INTRAMUSCULAR; INTRAVENOUS at 15:02

## 2024-02-18 RX ADMIN — POTASSIUM & SODIUM PHOSPHATES POWDER PACK 280-160-250 MG 1 PACKET: 280-160-250 PACK at 15:02

## 2024-02-18 ASSESSMENT — ACTIVITIES OF DAILY LIVING (ADL)
ADLS_ACUITY_SCORE: 45
NUMBER_OF_TIMES_PATIENT_HAS_FALLEN_WITHIN_LAST_SIX_MONTHS: 1
ADLS_ACUITY_SCORE: 45
ADLS_ACUITY_SCORE: 44
ADLS_ACUITY_SCORE: 45
ADLS_ACUITY_SCORE: 45
WEAR_GLASSES_OR_BLIND: NO
ADLS_ACUITY_SCORE: 44
WALKING_OR_CLIMBING_STAIRS_DIFFICULTY: NO
TOILETING: 1-->ASSISTANCE (EQUIPMENT/PERSON) NEEDED (NOT DEVELOPMENTALLY APPROPRIATE)
DRESSING/BATHING_DIFFICULTY: NO
ADLS_ACUITY_SCORE: 44
ADLS_ACUITY_SCORE: 45
FALL_HISTORY_WITHIN_LAST_SIX_MONTHS: YES
ADLS_ACUITY_SCORE: 44
TOILETING: 1-->ASSISTANCE (EQUIPMENT/PERSON) NEEDED
CONCENTRATING,_REMEMBERING_OR_MAKING_DECISIONS_DIFFICULTY: NO
CHANGE_IN_FUNCTIONAL_STATUS_SINCE_ONSET_OF_CURRENT_ILLNESS/INJURY: YES
ADLS_ACUITY_SCORE: 45

## 2024-02-18 NOTE — PLAN OF CARE
Goal Outcome Evaluation:      Plan of Care Reviewed With: patient    Reason for Admission: AMS/Confusion, Metabolic encephalopathy     Cognitive/Mentation: A/Ox 4  Neuros/CMS: Intact   VS: Stable on RA.   Tele: SR with 1st degree AV block.  GI: Continent.  : Continent.  Pulmonary: LS clear  Pain: Denies.     Drains/Lines: SL PIV  Skin: Intact, robert BLE  Activity: Assist x 1 with GB/W.  Diet: Mod CHO with thin liquids. Takes pills whole with water.     Therapies recs: Pending  Discharge: Pending    Aggression Stoplight Tool: Yellow - impulsive/forgetful at times    End of shift summary: Mentation improved this evening. Insulin gtt to be initiated for continued elevated blood sugars.

## 2024-02-18 NOTE — PROGRESS NOTES
[facility]    Medicine Progress Note - Hospitalist Service    Date of Admission:  2/13/2024    Assessment & Plan   Coretta Kolb is a 78 year old female who was admitted on 2/13/2024.      Past medical history significant for HTN, HLP, DM2, Diabetic Neuropathy, Diabetic retinopathy, Obesity, Hypothyroidism, MDD with anxiety, Peripheral edema, Venous insufficiency who was registered to short-stay/observation due to hyperglycemia, pyuria, hypertension and encephalopathy.       Patient presented to the ED as her ophthalmology team was concerned with some increased confusion, as well as daughter.  Work-up in the ED revealed that patient's blood glucose was uncontrolled (glucose of 351 and on recheck at 508).  UA was grossly abnormal and concerning for UTI.  BP was elevated at 193/83.  Patient's daughter and family with some more longstanding concern about patient's safety to continue to live independently with her medication issues.     Per Admission H&P, patient with some increased confusion noted by her ophthalmology team earlier on day of presentation.  Daughter had also reported that she has had concerns about confusion in the past few weeks as well.  Given prior concern for patient's ability to safely live independently even 6 months ago before she signed out of her care facility and now medication nonadherence with increasing A1c, suspect this may be a more long-term issue.  Daughter feels the same. 1 fall reported at the end of summer since leaving care facility 6 months ago.    On 2/16 had worsening mentation, lethargy. Had work-up with LP which was negative for bacterial infection, but there was some confusion initially as culture was reported to contain gram + cocci and she was started on broad spectrum abx, IVF and steroids. On 2/17 she had relatively quick transition back to more awake, vibrant mental status with some remaining confusion. Perhaps she was in early myxedema crisis  and was pulled out with initiation of steroid (effectively treating her adrenal insufficiency).     Acute mental status change, possible early myxedema crisis 2/16  Metabolic encephalopathy  Presumed adrenal insufficiency  *Worsening mentation morning of 2/16.  Nurse paged regarding increased lethargy/somnolence. Patient had almost fell while on the toilet as she was falling asleep. She was unable to remain awake.  Glucose level was within normal limits at time of assessment (188) and BP was normotensive.  Assessed patient and had RRT called.  VBG pH of 7.44, pCO2 of 38, pO2 of 42. Strep negative. CXR negative for infiltrate. Procal 0.13. UA without findings of infection  *See House HO note  -Unclear how long patient was not taking medications and as such will adjust PTA medications to ensure they are not contributing to mentation changes.    *LP 2/16 initially with 2+ gram+ cocci (inaccurate report), corrected to report NO growth. Rest of LP not consistent with bacterial infection. Meningitis panel negative. Started on vancomycin, ceftriaxone, ampicillin, decadron 10mg q6h and had rapid transition of mental status AM of 2/17 (see above)  *2/16 EEG: toxic/metabolic encephalopathy, no seizures.  *2/16 CT head: no acute intracranial abnormality  *2/17 MRI: no stroke. Does show brain atrophy, chronic small vessel ischemic disease. Meningioma (known since 2014) slightly increased in size.  *2/18 cortisol checked, WNL, but has been receiving steroids for ~36h prior  - ID consulted with erroneous LP culture--Abx discontinued, checking [place]ile serum  -General Neurology appreciated, can follow up as outpatient with [facility] or [facility] regarding cognitive decline and tremors  - CPAP when sleeping.    - blood CX x2 NGTD 2/17  - continue hydrocortisone 100mg q8h, last dose 2/18 evening, then decrease to 50mg q8h 2/19 AM  - continue thyroid supplement (resumed on 2/14)  -Care management consultation for disposition  planning.  -OT following. On 2/15, her SLUMS was 13/30, reported to be below baseline with significant balance deficits and poor activity tolerance --> recommended TCU   -TCU is fully supported by myself as patient's home is not a safe discharge plan due to mobility, impulsivity and lack of supervision (particularly with medications)  -Likely benefit from Neuropsych referral at discharge.    -Encouraged patient to have her daughter, Janet, present at future appointments; she indicated that her daughter would be her healthcare decision-maker if she were unable to make decisions on her own.     Uncontrolled type 2 diabetes with neuropathy, retinopathy  Hyperglycemia  *Largely secondary to medication nonadherence.  Estimates that she misses her insulin twice per week.  Follows with endocrine through [facility].  Hemoglobin A1c has been increasing on outpatient follow-up; improved to 7.9 during her stay at TCU and subsequent assisted living facility, now greater than 11.4%  *Patient expressed financial concerns resulting in skipped insulin doses at times.    *2/15: was on Lantus 32 units in AM, humalog 75/25 50u AM and 60u PM  *2/16 AMS and limited PO intake. Humalog mix discontinued, Lantus decreased to 25 units.  -Recommend close follow-up with endocrine and consideration of oral hypoglycemic agents as attempting to wean from Lantus, which is likely the most expensive aspect of her diabetes regimen.    -Patient reports inability to take metformin, but cannot state why this was the case  -given ongoing high dose steroids, continue insulin gtt (started 2/17), ikely wean back to long/short acting insulins on 2/19 if BGs reasonably controlled  - added back lantus 10 units on 2/18, increase to 25 units in AM on 2/19  - IMC status while on insulin gtt  -decrease PTA gabapentin to 100mg BID and resume on 2/18--monitor for sedation (previously on 900mg BID)     Pyuria  *Patient with urinary frequency, pyuria on  "urinalysis.  Symptoms and findings could potentially be secondary to uncontrolled diabetes as well.  *Urine culture with mixed urogenital luisa.    -Stopped ceftriaxone 2/15.       Hypothyroidism  Early myxedema crisis (see above)  Adrenal insufficiency (see above)  -TSH elevated at 8.11 with low Free T4 of 0.88.    - see above with treatment with stress dose steroids, continuing thyroid supplement 112mcg daily    Hypophosphatemia  Replete per protocol     Depression  *Per Pharmacy med rec, patient no longer taking Cymbalta.       Hypotension  Hypertension  PTA amlodipine 10 mg/d and lisinopril 30 mg/d  - resumed PTA amlodipine 10mg  - PTA lisinopril increased back to 30mg/d on 2/19  - PRN hydralazine for SBP>180     HLP  -Hold PTA Crestor 20 mg/d.  Resume at discharge.       Peripheral edema  Venous insufficiency   - Hold PTA lasix 20 mg M/W/F and resume at discharge.       Obesity   Body mass index is 41.57 kg/m .  Increase in all-cause morbidity and mortality.   - Follow up with PCP regarding ongoing management.             Diet: Combination Diet Moderate Consistent Carb (60 g CHO per Meal) Diet; Low Saturated Fat Na <2400mg Diet  Room Service    DVT Prophylaxis: Pneumatic Compression Devices  Rodriguez Catheter: Not present  Lines: None     Cardiac Monitoring: ACTIVE order. Indication: Tachyarrhythmias, acute (48 hours)  Code Status: Full Code      Clinically Significant Risk Factors              # Hypoalbuminemia: Lowest albumin = 3.1 g/dL at 2/16/2024 11:58 AM, will monitor as appropriate            # Severe Obesity: Estimated body mass index is 41.57 kg/m  as calculated from the following:    Height as of this encounter: 1.549 m (5' 1\").    Weight as of this encounter: 99.8 kg (220 lb)., PRESENT ON ADMISSION       # Financial/Environmental Concerns: other (see comments) (pt denies issues affording medication but daughter states pt told the doctor in ED she doesn't take her insulin everyday due to cost)     "     Disposition Plan      Expected Discharge Date: 02/20/2024        Discharge Comments: UTI+   IV abx.   UC pending.   Parameters now on b/p meds            Mayte Estrada DO  Hospitalist Service  [facility]  Securely message with [employer] (more info)  Text page via [employer] Paging/Directory   ______________________________________________________________________    Interval History   Patient seen and examined. She is doing pretty good. Fully oriented today. A little hyperactive, but conversation is easier and she is making more sense. Still not back to her baseline. Tolerating insulin gtt. No BM in a few days--had been struggling with diarrhea at home, so will add bowel regimen here. Updated daughter Janet via phone.    Physical Exam   Vital Signs: Temp: 97.6  F (36.4  C) Temp src: Oral BP: (!) 164/78 Pulse: 66   Resp: 14 SpO2: 98 % O2 Device: None (Room air)    Weight: 220 lbs 0 oz    Constitutional: Awake, alert, cooperative, no apparent distress  Respiratory: Clear to auscultation bilaterally, no crackles or wheezing  Cardiovascular: Regular rate and rhythm, normal S1 and S2, and no murmur noted  GI: Normal bowel sounds, soft, non-distended, non-tender  Skin/Integumen: No rashes, no cyanosis, +1 nonpitting lower extremity edema  Neuro:  Oriented to self, place, date, why she is in hospital and recent events    Medical Decision Making       55 MINUTES SPENT BY ME on the date of service doing chart review, history, exam, documentation & further activities per the note.      Data     I have personally reviewed the following data over the past 24 hrs:    16.3 (H)  \   14.5   / 260     141 108 (H) 20.1 /  132 (H)   3.9 23 0.69 \       Imaging results reviewed over the past 24 hrs:   Recent Results (from the past 24 hour(s))   MR Brain w/o & w Contrast    Narrative    EXAM: MR BRAIN W/O and W CONTRAST  LOCATION: [facility]  DATE: 2/17/2024    INDICATION: Confusion,  slurred speech  COMPARISON:  MRI brain 10/27/2014.  CONTRAST: 9 mL Gadavist  TECHNIQUE: Routine multiplanar multisequence head MRI without and with intravenous contrast.    FINDINGS:  INTRACRANIAL CONTENTS: No acute or subacute infarct. No acute hemorrhage or extra-axial fluid collections. 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 Patchy   nonspecific T2/FLAIR hyperintensities within the cerebral white matter and christopher most consistent with mild to moderate chronic microvascular ischemic change. Mild to moderate generalized cerebral atrophy. No hydrocephalus. Normal position of the   cerebellar tonsils.    SELLA: Empty sella morphology with flattening of the pituitary gland along the sellar floor.    OSSEOUS STRUCTURES/SOFT TISSUES: Normal marrow signal. The major intracranial vascular flow voids are maintained.     ORBITS: Prior bilateral cataract surgery. Visualized portions of the orbits are otherwise unremarkable.     SINUSES/MASTOIDS: No paranasal sinus mucosal disease. Scattered fluid/membrane thickening in the right mastoid air cells. No apparent mass in the posterior nasopharynx or skull base.       Impression    IMPRESSION:  1.  No acute intracranial process.  2.  Generalized brain atrophy and presumed microvascular ischemic changes as detailed above.  3.  Slight increased size of the small left petrous meningioma since 2014.

## 2024-02-18 NOTE — PROGRESS NOTES
Care Management Follow Up    Length of Stay (days): 3    Expected Discharge Date: 02/20/2024     Concerns to be Addressed: discharge planning   Patient plan of care discussed at interdisciplinary rounds: Yes    Anticipated Discharge Disposition: [facility]    Additional Information:  SW called to update [facility] nursing staff that patient is definitely not discharging today to them. Per hospitalist, patient may be medically ready to discharge Tuesday at the earliest.     MEERA Roche, LGSW   Social Work   [facility]

## 2024-02-18 NOTE — PLAN OF CARE
Goal Outcome Evaluation:      Plan of Care Reviewed With: patient    Reason for Admission: AMS, possible myxedema crisis    Cognitive/Mentation: A/Ox 4 - forgetful at times  Neuros/CMS: Intact ex generalized weakness. BLE edema  VS: Stable on RA.   Tele: SR with 1st degree AV Block .  GI: Last BM PTA, stool softeners given today  : Continent.  Pulmonary: LS clear.  Pain: Denies.     Drains/Lines: PIV - infusing  Skin: Intact, robert/flaky BLE   Activity: Assist x 1 with GB.  Diet: Mod CHO with thin liquids. Takes pills whole with water.     Therapies recs: TCU  Discharge: Pending medical readiness    Aggression Stoplight Tool: Green    End of shift summary: Stable today. Continues on insulin gtt. Phos replaced, redraw with a.m labs.

## 2024-02-18 NOTE — PLAN OF CARE
Reason for Admission: AMS/confusion    Cognitive/Mentation: A/O x4   Neuros/CMS: Intact  VS: Stable on RA   Tele: SR with 1st degree SV block  GI:Continent   : Continent   Pulmonary: LS clear   Pain: Denies     Drains/Lines: Insulin gtt infusing, NS infusing @75 mL/hr   Skin: Intact with robert BLE  Activity: Assist x1 with GBW  Diet: Mod CHO with thin liquids. Takes pills whole with water.     Therapies recs: [facility]  Discharge: pending once medically ready     Aggression Spotlight Tool: Yellow for impulsivity and forgetfulness at times     End of shift summary: Insulin gtt initiated this shift.

## 2024-02-19 LAB
ANION GAP SERPL CALCULATED.3IONS-SCNC: 9 MMOL/L (ref 7–15)
BUN SERPL-MCNC: 17.6 MG/DL (ref 8–23)
CALCIUM SERPL-MCNC: 8.8 MG/DL (ref 8.8–10.2)
CHLORIDE SERPL-SCNC: 108 MMOL/L (ref 98–107)
CREAT SERPL-MCNC: 0.62 MG/DL (ref 0.51–0.95)
DEPRECATED HCO3 PLAS-SCNC: 24 MMOL/L (ref 22–29)
EGFRCR SERPLBLD CKD-EPI 2021: >90 ML/MIN/1.73M2
GLUCOSE BLDC GLUCOMTR-MCNC: 106 MG/DL (ref 70–99)
GLUCOSE BLDC GLUCOMTR-MCNC: 109 MG/DL (ref 70–99)
GLUCOSE BLDC GLUCOMTR-MCNC: 110 MG/DL (ref 70–99)
GLUCOSE BLDC GLUCOMTR-MCNC: 117 MG/DL (ref 70–99)
GLUCOSE BLDC GLUCOMTR-MCNC: 119 MG/DL (ref 70–99)
GLUCOSE BLDC GLUCOMTR-MCNC: 158 MG/DL (ref 70–99)
GLUCOSE BLDC GLUCOMTR-MCNC: 169 MG/DL (ref 70–99)
GLUCOSE BLDC GLUCOMTR-MCNC: 229 MG/DL (ref 70–99)
GLUCOSE BLDC GLUCOMTR-MCNC: 279 MG/DL (ref 70–99)
GLUCOSE BLDC GLUCOMTR-MCNC: 70 MG/DL (ref 70–99)
GLUCOSE BLDC GLUCOMTR-MCNC: 98 MG/DL (ref 70–99)
GLUCOSE SERPL-MCNC: 95 MG/DL (ref 70–99)
MAGNESIUM SERPL-MCNC: 2.2 MG/DL (ref 1.7–2.3)
PHOSPHATE SERPL-MCNC: 2.6 MG/DL (ref 2.5–4.5)
POTASSIUM SERPL-SCNC: 3.6 MMOL/L (ref 3.4–5.3)
SODIUM SERPL-SCNC: 141 MMOL/L (ref 135–145)
VDRL CSF QL: NON REACTIVE

## 2024-02-19 PROCEDURE — 84100 ASSAY OF PHOSPHORUS: CPT | Performed by: HOSPITALIST

## 2024-02-19 PROCEDURE — 36415 COLL VENOUS BLD VENIPUNCTURE: CPT | Performed by: HOSPITALIST

## 2024-02-19 PROCEDURE — 83735 ASSAY OF MAGNESIUM: CPT | Performed by: HOSPITALIST

## 2024-02-19 PROCEDURE — 120N000001 HC R&B MED SURG/OB

## 2024-02-19 PROCEDURE — 99418 PROLNG IP/OBS E/M EA 15 MIN: CPT | Performed by: HOSPITALIST

## 2024-02-19 PROCEDURE — 250N000013 HC RX MED GY IP 250 OP 250 PS 637: Performed by: HOSPITALIST

## 2024-02-19 PROCEDURE — 250N000011 HC RX IP 250 OP 636: Performed by: HOSPITALIST

## 2024-02-19 PROCEDURE — 80048 BASIC METABOLIC PNL TOTAL CA: CPT | Performed by: HOSPITALIST

## 2024-02-19 PROCEDURE — 99233 SBSQ HOSP IP/OBS HIGH 50: CPT | Performed by: HOSPITALIST

## 2024-02-19 RX ORDER — ROSUVASTATIN CALCIUM 20 MG/1
20 TABLET, COATED ORAL AT BEDTIME
Status: DISCONTINUED | OUTPATIENT
Start: 2024-02-19 | End: 2024-02-23 | Stop reason: HOSPADM

## 2024-02-19 RX ADMIN — SENNOSIDES AND DOCUSATE SODIUM 1 TABLET: 50; 8.6 TABLET ORAL at 08:22

## 2024-02-19 RX ADMIN — LEVOTHYROXINE SODIUM 112 MCG: 112 TABLET ORAL at 08:21

## 2024-02-19 RX ADMIN — SENNOSIDES AND DOCUSATE SODIUM 1 TABLET: 50; 8.6 TABLET ORAL at 20:18

## 2024-02-19 RX ADMIN — LISINOPRIL 30 MG: 20 TABLET ORAL at 08:22

## 2024-02-19 RX ADMIN — HYDROCORTISONE SODIUM SUCCINATE 50 MG: 100 INJECTION, POWDER, FOR SOLUTION INTRAMUSCULAR; INTRAVENOUS at 09:48

## 2024-02-19 RX ADMIN — GABAPENTIN 100 MG: 100 CAPSULE ORAL at 08:22

## 2024-02-19 RX ADMIN — GABAPENTIN 100 MG: 100 CAPSULE ORAL at 20:18

## 2024-02-19 RX ADMIN — ROSUVASTATIN CALCIUM 20 MG: 20 TABLET, FILM COATED ORAL at 22:29

## 2024-02-19 RX ADMIN — HYDROCORTISONE SODIUM SUCCINATE 50 MG: 100 INJECTION, POWDER, FOR SOLUTION INTRAMUSCULAR; INTRAVENOUS at 16:02

## 2024-02-19 RX ADMIN — AMLODIPINE BESYLATE 10 MG: 10 TABLET ORAL at 08:22

## 2024-02-19 ASSESSMENT — ACTIVITIES OF DAILY LIVING (ADL)
ADLS_ACUITY_SCORE: 31
ADLS_ACUITY_SCORE: 31
ADLS_ACUITY_SCORE: 33
ADLS_ACUITY_SCORE: 31

## 2024-02-19 NOTE — PROGRESS NOTES
Pt here with AMS. A&O X4. Neuros intact- some generalized weakness. VSS- BP<180. Tele NSR with 1st degree AV block. Mod Carb diet, thin liquids. Takes pills whole. Up with Ax1 GB/W. Denies pain. Pt scoring green on the Aggression Stop Light Tool. Insulin gtt stopped this morning- will continue to monitor ACHS. Skin is robert/flaky on BLE along with slight edema, +1. Discharge to TCU pending.

## 2024-02-19 NOTE — PROGRESS NOTES
[facility]    Medicine Progress Note - Hospitalist Service    Date of Admission:  2/13/2024    Assessment & Plan   Coretta Kolb is a 78 year old female who was admitted on 2/13/2024.      Past medical history significant for HTN, HLP, DM2, Diabetic Neuropathy, Diabetic retinopathy, Obesity, Hypothyroidism, MDD with anxiety, Peripheral edema, Venous insufficiency who was registered to short-stay/observation due to hyperglycemia, pyuria, hypertension and encephalopathy.       Patient presented to the ED as her ophthalmology team was concerned with some increased confusion, as well as daughter.  Work-up in the ED revealed that patient's blood glucose was uncontrolled (glucose of 351 and on recheck at 508).  UA was grossly abnormal and concerning for UTI.  BP was elevated at 193/83.  Patient's daughter and family with some more longstanding concern about patient's safety to continue to live independently with her medication issues.     Per Admission H&P, patient with some increased confusion noted by her ophthalmology team earlier on day of presentation.  Daughter had also reported that she has had concerns about confusion in the past few weeks as well.  Given prior concern for patient's ability to safely live independently even 6 months ago before she signed out of her care facility and now medication nonadherence with increasing A1c, suspect this may be a more long-term issue.  Daughter feels the same. 1 fall reported at the end of summer since leaving care facility 6 months ago.    On 2/16 had worsening mentation, lethargy. Had work-up with LP which was negative for bacterial infection, but there was some confusion initially as culture was reported to contain gram + cocci and she was started on broad spectrum abx, IVF and steroids. On 2/17 she had relatively quick transition back to more awake, vibrant mental status with some remaining confusion. Perhaps she was in early myxedema crisis  and was pulled out with initiation of steroid (effectively treating her adrenal insufficiency).     Acute mental status change, possible early myxedema crisis 2/16  Metabolic encephalopathy  Presumed adrenal insufficiency  *Worsening mentation morning of 2/16.  Nurse paged regarding increased lethargy/somnolence. Patient had almost fell while on the toilet as she was falling asleep. She was unable to remain awake.  Glucose level was within normal limits at time of assessment (188) and BP was normotensive.  Assessed patient and had RRT called.  VBG pH of 7.44, pCO2 of 38, pO2 of 42. Strep negative. CXR negative for infiltrate. Procal 0.13. UA without findings of infection  *See House HO note  *LP 2/16 initially with 2+ gram+ cocci (inaccurate report), corrected to report NO growth. Rest of LP not consistent with bacterial infection. Meningitis panel negative. Started on vancomycin, ceftriaxone, ampicillin, decadron 10mg q6h and had rapid transition of mental status AM of 2/17 (see above). Abx discontinued 2/17  *2/16 EEG: toxic/metabolic encephalopathy, no seizures.  *2/16 CT head: no acute intracranial abnormality  *2/17 MRI: no stroke. Does show brain atrophy, chronic small vessel ischemic disease. Meningioma (known since 2014) slightly increased in size.  *2/18 cortisol checked, WNL, but has been receiving steroids for ~36h prior  - ID consulted with erroneous LP culture--Abx discontinued, checking [place] serum  -General Neurology appreciated, can follow up as outpatient with [facility] or [facility] regarding cognitive decline and tremors  - CPAP when sleeping.    - blood CX x2 NGTD  - continue hydrocortisone taper, now 50mg q8h since 2/19, continue to decrease as mental status stabilizes   -slightly more tired on 2/19, will keep current dose  - continue thyroid supplement (resumed on 2/14)  - OT following. On 2/15, her SLUMS was 13/30, reported to be below baseline with significant balance deficits and poor  activity tolerance   - Plan Neuropsych referral at discharge.    - TCU on discharge     Uncontrolled type 2 diabetes with neuropathy, retinopathy  Hyperglycemia  *Largely secondary to medication nonadherence.  Estimates that she misses her insulin twice per week.  [email or web address]  Hemoglobin A1c has been increasing on outpatient follow-up; improved to 7.9 during her stay at [facility] and subsequent assisted living facility, now greater than 11.4%  *Patient expressed financial concerns resulting in skipped insulin doses at times.    *2/15: was on Lantus 32 units in AM, humalog 75/25 50u AM and 60u PM  *2/16 AMS and limited PO intake. Humalog mix discontinued, Lantus decreased to 25 units.  -Recommend close follow-up with endocrine and consideration of oral hypoglycemic agents as attempting to wean from Lantus (due to expense)  - lantus 25u qam-->30u qam on 2/20  - give additional 10 units lantus evening of 2/19 while still on high dose steroids (due at midnight)  -decrease PTA gabapentin to 100mg BID, resumed 2/19     Pyuria  *Patient with urinary frequency, pyuria on urinalysis.  Symptoms and findings could potentially be secondary to uncontrolled diabetes as well.  *Urine culture with mixed urogenital luisa.    -Stopped ceftriaxone 2/15.       Hypothyroidism  Early myxedema crisis (see above)  Adrenal insufficiency (see above)  -TSH elevated at 8.11 with low Free T4 of 0.88.    - see above with treatment with stress dose steroids, continuing thyroid supplement 112mcg daily    Hypophosphatemia  Replete per protocol     Depression  *patient no longer taking Cymbalta.       Hypotension  Hypertension  PTA amlodipine 10 mg/d and lisinopril 30 mg/d  - resumed PTA amlodipine 10mg  - PTA lisinopril increased back to 30mg/d on 2/19  - PRN hydralazine for SBP>180     HLP  -Hold PTA Crestor 20 mg/d.  Resume at discharge.       Peripheral edema  Venous insufficiency   - Hold PTA lasix 20 mg  "M/W/F and resume at discharge.       Obesity   Body mass index is 41.57 kg/m .  Increase in all-cause morbidity and mortality.   - Follow up with PCP regarding ongoing management.             Diet: Combination Diet Moderate Consistent Carb (60 g CHO per Meal) Diet; Low Saturated Fat Na <2400mg Diet  Room Service    DVT Prophylaxis: Pneumatic Compression Devices  Rodriguez Catheter: Not present  Lines: None     Cardiac Monitoring: ACTIVE order. Indication: Tachyarrhythmias, acute (48 hours)  Code Status: Full Code      Clinically Significant Risk Factors              # Hypoalbuminemia: Lowest albumin = 3.1 g/dL at 2/16/2024 11:58 AM, will monitor as appropriate            # Severe Obesity: Estimated body mass index is 41.57 kg/m  as calculated from the following:    Height as of this encounter: 1.549 m (5' 1\").    Weight as of this encounter: 99.8 kg (220 lb).        # Financial/Environmental Concerns: other (see comments) (pt denies issues affording medication but daughter states pt told the doctor in ED she doesn't take her insulin everyday due to cost)         Disposition Plan      Expected Discharge Date: 02/22/2024    Discharge Delays: Placement - TCU    Discharge Comments: Weaning from IV steroids            Mayte Estrada DO  Hospitalist Service  [facility]  Securely message with [employer] (more info)  Text page via [employer] Paging/Directory   ______________________________________________________________________    Interval History   Patient seen and examined. Slightly slower to respond today. Reports poor sleep. Daughter flores present at bedside. Discussed plan of care with TCU and steroids. Spent 40 minutes at bedside reviewing plan of care, evaluating patient.    Physical Exam   Vital Signs: Temp: 97.8  F (36.6  C) Temp src: Oral BP: (!) 156/71 Pulse: 73   Resp: 16 SpO2: 97 % O2 Device: None (Room air)    Weight: 220 lbs 0 oz    Constitutional: Awake, alert, cooperative, no apparent " distress  Respiratory: Clear to auscultation bilaterally, no crackles or wheezing  Cardiovascular: Regular rate and rhythm, normal S1 and S2, and no murmur noted  GI: Normal bowel sounds, soft, non-distended, non-tender  Skin/Integumen: No rashes, no cyanosis, +1 nonpitting lower extremity edema  Neuro:  Oriented to self, place, date, why she is in hospital and recent events. Speech slightly slower to respond    Medical Decision Making       65 MINUTES SPENT BY ME on the date of service doing chart review, history, exam, documentation & further activities per the note.      Data     I have personally reviewed the following data over the past 24 hrs:    N/A  \   N/A   / N/A     141 108 (H) 17.6 /  229 (H)   3.6 24 0.62 \       Imaging results reviewed over the past 24 hrs:   No results found for this or any previous visit (from the past 24 hour(s)).

## 2024-02-19 NOTE — PLAN OF CARE
Reason for Admission: AMS/possible myxedema crisis     Cognitive/Mentation: A/O x4, forgetful   Neuros/CMS: Intact ex generalized weakness   VS: Stable on RA   Tele: SR with 1st degree AV block  GI: Continent, no BM since PTA- pt took senna but refused PRN miralax   : Continent   Pulmonary: LS clear   Pain: Denies     Drains/Lines: Insulin gtt infusing, NS infusing @10 mL/hr   Skin: Intact with robert BLE   Activity: Assist x1 with GBW  Diet: Mod CHO with thin liquids. Takes pills whole with water.     Therapies recs: [facility]  Discharge: pending once medically ready     Aggression Spotlight Tool: Green     End of shift summary: Pt stable and continues on insulin gtt. Phos and MG redraws this morning.

## 2024-02-19 NOTE — PROGRESS NOTES
Care Management Follow Up    Length of Stay (days): 4    Expected Discharge Date: 02/20/2024     Concerns to be Addressed: cognitive/perceptual, compliance issue, discharge planning, medication     Patient plan of care discussed at interdisciplinary rounds: Yes  Per rounds, pt is not medically ready to discharge today. She is anticipated to be ready for discharge tomorrow    Anticipated Discharge Disposition: Home, Home Care (possible home care, awaiting OT evaluation)     Anticipated Discharge Services:    Anticipated Discharge DME:      Patient/family educated on Medicare website which has current facility and service quality ratings:    Education Provided on the Discharge Plan:    Patient/Family in Agreement with the Plan: yes    Referrals Placed by CM/EVENS:    Private pay costs discussed:     Additional Information:  EVENS received call from Tisha with [facility] admissions stating that they are able to accept pt IF she is ready today but cannot hold the room after today.  EVENS confirmed that pt is not medically ready to discharge today and updated Tisha at [facility] that she is not discharging today.      BERENICE Wasserman

## 2024-02-20 ENCOUNTER — APPOINTMENT (OUTPATIENT)
Dept: OCCUPATIONAL THERAPY | Facility: CLINIC | Age: 79
DRG: 637 | End: 2024-02-20
Attending: HOSPITALIST
Payer: MEDICARE

## 2024-02-20 LAB
GLUCOSE BLDC GLUCOMTR-MCNC: 186 MG/DL (ref 70–99)
GLUCOSE BLDC GLUCOMTR-MCNC: 197 MG/DL (ref 70–99)
GLUCOSE BLDC GLUCOMTR-MCNC: 257 MG/DL (ref 70–99)
GLUCOSE BLDC GLUCOMTR-MCNC: 257 MG/DL (ref 70–99)
GLUCOSE BLDC GLUCOMTR-MCNC: 273 MG/DL (ref 70–99)
PHOSPHATE SERPL-MCNC: 2.3 MG/DL (ref 2.5–4.5)
POTASSIUM SERPL-SCNC: 3.7 MMOL/L (ref 3.4–5.3)

## 2024-02-20 PROCEDURE — 99233 SBSQ HOSP IP/OBS HIGH 50: CPT | Performed by: HOSPITALIST

## 2024-02-20 PROCEDURE — 120N000001 HC R&B MED SURG/OB

## 2024-02-20 PROCEDURE — 250N000011 HC RX IP 250 OP 636: Performed by: HOSPITALIST

## 2024-02-20 PROCEDURE — 97535 SELF CARE MNGMENT TRAINING: CPT | Mod: GO

## 2024-02-20 PROCEDURE — 250N000013 HC RX MED GY IP 250 OP 250 PS 637: Performed by: PHYSICIAN ASSISTANT

## 2024-02-20 PROCEDURE — 84132 ASSAY OF SERUM POTASSIUM: CPT | Performed by: HOSPITALIST

## 2024-02-20 PROCEDURE — 36415 COLL VENOUS BLD VENIPUNCTURE: CPT | Performed by: HOSPITALIST

## 2024-02-20 PROCEDURE — 250N000013 HC RX MED GY IP 250 OP 250 PS 637: Performed by: HOSPITALIST

## 2024-02-20 PROCEDURE — 84100 ASSAY OF PHOSPHORUS: CPT | Performed by: HOSPITALIST

## 2024-02-20 RX ORDER — FUROSEMIDE 20 MG
20 TABLET ORAL
Status: DISCONTINUED | OUTPATIENT
Start: 2024-02-21 | End: 2024-02-23 | Stop reason: HOSPADM

## 2024-02-20 RX ORDER — AMOXICILLIN 250 MG
2 CAPSULE ORAL 2 TIMES DAILY
Status: DISCONTINUED | OUTPATIENT
Start: 2024-02-20 | End: 2024-02-23 | Stop reason: HOSPADM

## 2024-02-20 RX ADMIN — POTASSIUM & SODIUM PHOSPHATES POWDER PACK 280-160-250 MG 1 PACKET: 280-160-250 PACK at 12:51

## 2024-02-20 RX ADMIN — ROSUVASTATIN CALCIUM 20 MG: 20 TABLET, FILM COATED ORAL at 22:10

## 2024-02-20 RX ADMIN — SENNOSIDES AND DOCUSATE SODIUM 2 TABLET: 50; 8.6 TABLET ORAL at 20:37

## 2024-02-20 RX ADMIN — BISACODYL 10 MG: 10 SUPPOSITORY RECTAL at 15:36

## 2024-02-20 RX ADMIN — GABAPENTIN 100 MG: 100 CAPSULE ORAL at 10:49

## 2024-02-20 RX ADMIN — AMLODIPINE BESYLATE 10 MG: 10 TABLET ORAL at 07:54

## 2024-02-20 RX ADMIN — HYDROCORTISONE SODIUM SUCCINATE 50 MG: 100 INJECTION, POWDER, FOR SOLUTION INTRAMUSCULAR; INTRAVENOUS at 01:03

## 2024-02-20 RX ADMIN — LEVOTHYROXINE SODIUM 112 MCG: 112 TABLET ORAL at 07:50

## 2024-02-20 RX ADMIN — POTASSIUM & SODIUM PHOSPHATES POWDER PACK 280-160-250 MG 1 PACKET: 280-160-250 PACK at 17:23

## 2024-02-20 RX ADMIN — LISINOPRIL 30 MG: 20 TABLET ORAL at 07:50

## 2024-02-20 RX ADMIN — POTASSIUM & SODIUM PHOSPHATES POWDER PACK 280-160-250 MG 1 PACKET: 280-160-250 PACK at 20:37

## 2024-02-20 RX ADMIN — HYDROCORTISONE SODIUM SUCCINATE 50 MG: 100 INJECTION, POWDER, FOR SOLUTION INTRAMUSCULAR; INTRAVENOUS at 07:55

## 2024-02-20 RX ADMIN — GABAPENTIN 100 MG: 100 CAPSULE ORAL at 20:37

## 2024-02-20 RX ADMIN — SENNOSIDES AND DOCUSATE SODIUM 1 TABLET: 50; 8.6 TABLET ORAL at 10:50

## 2024-02-20 RX ADMIN — HYDROCORTISONE SODIUM SUCCINATE 50 MG: 100 INJECTION, POWDER, FOR SOLUTION INTRAMUSCULAR; INTRAVENOUS at 20:39

## 2024-02-20 ASSESSMENT — ACTIVITIES OF DAILY LIVING (ADL)
ADLS_ACUITY_SCORE: 33

## 2024-02-20 NOTE — PROGRESS NOTES
[facility]    Medicine Progress Note - Hospitalist Service    Date of Admission:  2/13/2024    Assessment & Plan   Coretta Kolb is a 78 year old female who was admitted on 2/13/2024.      Past medical history significant for HTN, HLP, DM2, Diabetic Neuropathy, Diabetic retinopathy, Obesity, Hypothyroidism, MDD with anxiety, Peripheral edema, Venous insufficiency who was registered to short-stay/observation due to hyperglycemia, pyuria, hypertension and encephalopathy.       Patient presented to the ED as her ophthalmology team was concerned with some increased confusion, as well as daughter.  Work-up in the ED revealed that patient's blood glucose was uncontrolled (glucose of 351 and on recheck at 508).  UA was grossly abnormal and concerning for UTI.  BP was elevated at 193/83.  Patient's daughter and family with some more longstanding concern about patient's safety to continue to live independently with her medication issues.     Per Admission H&P, patient with some increased confusion noted by her ophthalmology team earlier on day of presentation.  Daughter had also reported that she has had concerns about confusion in the past few weeks as well.  Given prior concern for patient's ability to safely live independently even 6 months ago before she signed out of her care facility and now medication nonadherence with increasing A1c, suspect this may be a more long-term issue.  Daughter feels the same. 1 fall reported at the end of summer since leaving care facility 6 months ago.    On 2/16 had worsening mentation, lethargy. Had work-up with LP which was negative for bacterial infection, but there was some confusion initially as culture was reported to contain gram + cocci and she was started on broad spectrum abx, IVF and steroids. On 2/17 she had relatively quick transition back to more awake, vibrant mental status with some remaining confusion. Perhaps she was in early myxedema crisis  and was pulled out with initiation of steroid (effectively treating her adrenal insufficiency).     Acute mental status change, possible early myxedema crisis 2/16--resolving  Metabolic encephalopathy-resolving  Presumed adrenal insufficiency  *Worsening mentation morning of 2/16.  Nurse paged regarding increased lethargy/somnolence. Patient had almost fell while on the toilet as she was falling asleep. She was unable to remain awake.  Glucose level was within normal limits at time of assessment (188) and BP was normotensive.  Assessed patient and had RRT called.  VBG pH of 7.44, pCO2 of 38, pO2 of 42. Strep negative. CXR negative for infiltrate. Procal 0.13. UA without findings of infection  *See House HO note  *LP 2/16 initially with 2+ gram+ cocci (inaccurate report), corrected to report NO growth. Rest of LP not consistent with bacterial infection. Meningitis panel negative. Started on vancomycin, ceftriaxone, ampicillin, decadron 10mg q6h and had rapid transition of mental status AM of 2/17 (see above). Abx discontinued 2/17  *2/16 EEG: toxic/metabolic encephalopathy, no seizures.  *2/16 CT head: no acute intracranial abnormality  *2/17 MRI: no stroke. Does show brain atrophy, chronic small vessel ischemic disease. Meningioma (known since 2014) slightly increased in size.  *2/18 cortisol checked, WNL, but has been receiving steroids for ~36h prior  - ID consulted with erroneous LP culture--Abx discontinued, checking [place] serum  -General Neurology appreciated, can follow up as outpatient with [facility] or [facility] regarding cognitive decline and tremors  - CPAP when sleeping.    - blood CX x2 NGTD  - continue hydrocortisone taper, 50mg q12h on 2/20, wean to once daily on 2/21, continue to decrease as mental status stabilizes, hopeful for low dose steroid for a few days on discharge  - continue thyroid supplement (resumed on 2/14)  - OT following. On 2/15, her SLUMS was 13/30, reported to be below baseline  with significant balance deficits and poor activity tolerance. MACE was 19/30 on 2/20  - Neuropsych referral at discharge.    - TCU on discharge--SW will need to resend referrals     Uncontrolled type 2 diabetes with neuropathy, retinopathy  Hyperglycemia  *Largely secondary to medication nonadherence.  Estimates that she misses her insulin twice per week.  [facility]  Hemoglobin A1c has been increasing on outpatient follow-up; improved to 7.9 during her stay at [facility] and subsequent assisted living facility, now greater than 11.4%  *Patient expressed financial concerns resulting in skipped insulin doses at times.    *2/15: was on Lantus 32 units in AM, humalog 75/25 50u AM and 60u PM  *2/16 AMS and limited PO intake. Humalog mix discontinued, Lantus decreased to 25 units.  -Recommend close follow-up with endocrine and consideration of oral hypoglycemic agents as attempting to wean from Lantus (due to expense)  - lantus 30u qam-->35u on 2/21 AM  - give additional 10 units lantus evening of 2/20 while still on high dose steroids  -decreased PTA gabapentin to 100mg BID--continue reduced dose on discharge     Pyuria  *Patient with urinary frequency, pyuria on urinalysis. Symptoms and findings could potentially be secondary to uncontrolled diabetes as well.  *Urine culture with mixed urogenital luisa.    -Stopped ceftriaxone 2/15.       Hypothyroidism  Early myxedema crisis (see above)  Adrenal insufficiency (see above)  -TSH elevated at 8.11 with low free T4 of 0.88.    - see above with treatment with stress dose steroids, continuing thyroid supplement 112mcg daily    Hypophosphatemia  Replete per protocol     Depression  *patient no longer taking Cymbalta.       Hypotension  Hypertension  - PTA amlodipine 10 mg/d and lisinopril 30 mg/d resumed  - PRN hydralazine for SBP>180     HLP  -Resumed PTA crestor     Peripheral edema  Venous insufficiency   - resume PTA lasix MWF on  "2/20/24    Constipation  Continue senna BID, PRN miralax/suppository     Obesity   Body mass index is 41.57 kg/m .  Increase in all-cause morbidity and mortality.   - Follow up with PCP regarding ongoing management.             Diet: Combination Diet Moderate Consistent Carb (60 g CHO per Meal) Diet; Low Saturated Fat Na <2400mg Diet  Room Service    DVT Prophylaxis: Pneumatic Compression Devices  Rodriguez Catheter: Not present  Lines: None     Cardiac Monitoring: None  Code Status: Full Code      Clinically Significant Risk Factors              # Hypoalbuminemia: Lowest albumin = 3.1 g/dL at 2/16/2024 11:58 AM, will monitor as appropriate            # Severe Obesity: Estimated body mass index is 41.57 kg/m  as calculated from the following:    Height as of this encounter: 1.549 m (5' 1\").    Weight as of this encounter: 99.8 kg (220 lb).        # Financial/Environmental Concerns: other (see comments) (pt denies issues affording medication but daughter states pt told the doctor in ED she doesn't take her insulin everyday due to cost)         Disposition Plan     Expected Discharge Date: 02/22/2024    Discharge Delays: Placement - TCU    Discharge Comments: Weaning from IV steroids            Mayte Estrada DO  Hospitalist Service  [facility]  Securely message with [employer] (more info)  Text page via [employer] Paging/Directory   ______________________________________________________________________    Interval History   Patient seen and examined. Slept well overnight. She is a little hyperactive, but not lethargic like she was prior day's afternoon. No chest pain, shortness of breath. Fully oriented. Explained plan of care and she is in agreement. Discussed care plan with patient's daughter Janet for 10 minutes. Discussed care plan with RN on 7th and 6th floor.    Physical Exam   Vital Signs: Temp: 98  F (36.7  C) Temp src: Oral BP: (!) 171/84 Pulse: 74   Resp: 18 SpO2: 95 % O2 Device: None (Room " air)    Weight: 220 lbs 0 oz    Constitutional: Awake, alert, cooperative, no apparent distress  Respiratory: Clear to auscultation bilaterally, no crackles or wheezing  Cardiovascular: Regular rate and rhythm, normal S1 and S2, and no murmur noted  GI: Normal bowel sounds, soft, non-distended, non-tender  Skin/Integumen: No rashes, no cyanosis, +1 nonpitting lower extremity edema  Neuro:  Oriented to self, place, date, why she is in hospital and recent events.    Medical Decision Making       50 MINUTES SPENT BY ME on the date of service doing chart review, history, exam, documentation & further activities per the note.      Data     I have personally reviewed the following data over the past 24 hrs:    N/A  \   N/A   / N/A     N/A N/A N/A /  273 (H)   3.7 N/A N/A \       Imaging results reviewed over the past 24 hrs:   No results found for this or any previous visit (from the past 24 hour(s)).

## 2024-02-20 NOTE — PLAN OF CARE
Orientation/Cognitive: A/Ox4  Mobility Level/Assist Equipment: Ax1 GB and walker  Fall Risk (Y/N): yes  Behavior Concerns: none  Pain Management: none reported this shift  Tele/VS/O2: VSS on RA,  ABNL Lab/BG: K 2.7, Phos 2.3, phos replaced and recheck in am  Diet: mod carb  Bowel/Bladder: continent, No BM today. Suppository given  Skin Concerns: bilateral robert lower extremities   Drains/Devices: Right PIV SL  Tests/Procedures for next shift: ID following, neuro checks Q8 hrs, TCU pending  Anticipated DC date & active delays: tbd

## 2024-02-20 NOTE — PLAN OF CARE
Reason for Admission: AMS/possible myxedema crisis      Cognitive/Mentation: A/O x4, forgetful, int mild confusion   Neuros/CMS: Intact ex generalized weakness   VS: Stable on RA- htn within parameters   Tele: SR with 1st degree AV block  GI: Continent, no BM since PTA- pt refusing interventions   : Continent   Pulmonary: LS clear   Pain: Denies   Drains/Lines: SL  Skin: Intact with robert BLE   Activity: Assist x1 with GBW  Diet: Mod CHO with thin liquids. Takes pills whole with water.      Therapies recs: [facility]  Discharge: pending once medically ready      Aggression Spotlight Tool: Green      End of shift summary: BG checks AC HS, edematous

## 2024-02-20 NOTE — PLAN OF CARE
[id number]  Pt here with AMS, Metabolic encephalopathy. A&O. Neuros generalized weakness. VSS. Tele SR with 1st degree AVB. Mod carb diet, thin liquids. Takes pills whole. Up with A1/BGW. Denies pain. Pt scoring green on the Aggression Stop Light Tool. Plan is for TCU. Discharge pending.

## 2024-02-21 ENCOUNTER — APPOINTMENT (OUTPATIENT)
Dept: OCCUPATIONAL THERAPY | Facility: CLINIC | Age: 79
DRG: 637 | End: 2024-02-21
Attending: HOSPITALIST
Payer: MEDICARE

## 2024-02-21 LAB
ANION GAP SERPL CALCULATED.3IONS-SCNC: 6 MMOL/L (ref 7–15)
BACTERIA BLD CULT: NO GROWTH
BACTERIA BLD CULT: NO GROWTH
BACTERIA CSF CULT: NO GROWTH
BUN SERPL-MCNC: 14.6 MG/DL (ref 8–23)
CALCIUM SERPL-MCNC: 9 MG/DL (ref 8.8–10.2)
CHLORIDE SERPL-SCNC: 104 MMOL/L (ref 98–107)
CREAT SERPL-MCNC: 0.62 MG/DL (ref 0.51–0.95)
DEPRECATED HCO3 PLAS-SCNC: 30 MMOL/L (ref 22–29)
EGFRCR SERPLBLD CKD-EPI 2021: >90 ML/MIN/1.73M2
GLUCOSE BLDC GLUCOMTR-MCNC: 142 MG/DL (ref 70–99)
GLUCOSE BLDC GLUCOMTR-MCNC: 169 MG/DL (ref 70–99)
GLUCOSE BLDC GLUCOMTR-MCNC: 214 MG/DL (ref 70–99)
GLUCOSE BLDC GLUCOMTR-MCNC: 214 MG/DL (ref 70–99)
GLUCOSE BLDC GLUCOMTR-MCNC: 252 MG/DL (ref 70–99)
GLUCOSE SERPL-MCNC: 133 MG/DL (ref 70–99)
GRAM STAIN RESULT: NORMAL
GRAM STAIN RESULT: NORMAL
PHOSPHATE SERPL-MCNC: 2.3 MG/DL (ref 2.5–4.5)
POTASSIUM SERPL-SCNC: 3.3 MMOL/L (ref 3.4–5.3)
POTASSIUM SERPL-SCNC: 3.5 MMOL/L (ref 3.4–5.3)
SODIUM SERPL-SCNC: 140 MMOL/L (ref 135–145)
WNV IGG CSF IA-ACNC: 0.2 IV
WNV IGM CSF IA-ACNC: 0 IV

## 2024-02-21 PROCEDURE — 97530 THERAPEUTIC ACTIVITIES: CPT | Mod: GO

## 2024-02-21 PROCEDURE — 250N000011 HC RX IP 250 OP 636: Performed by: INTERNAL MEDICINE

## 2024-02-21 PROCEDURE — 250N000013 HC RX MED GY IP 250 OP 250 PS 637: Performed by: PHYSICIAN ASSISTANT

## 2024-02-21 PROCEDURE — 36415 COLL VENOUS BLD VENIPUNCTURE: CPT | Performed by: HOSPITALIST

## 2024-02-21 PROCEDURE — 84132 ASSAY OF SERUM POTASSIUM: CPT | Performed by: INTERNAL MEDICINE

## 2024-02-21 PROCEDURE — 250N000013 HC RX MED GY IP 250 OP 250 PS 637: Performed by: INTERNAL MEDICINE

## 2024-02-21 PROCEDURE — 80048 BASIC METABOLIC PNL TOTAL CA: CPT | Performed by: HOSPITALIST

## 2024-02-21 PROCEDURE — 36415 COLL VENOUS BLD VENIPUNCTURE: CPT | Performed by: INTERNAL MEDICINE

## 2024-02-21 PROCEDURE — 99233 SBSQ HOSP IP/OBS HIGH 50: CPT | Performed by: INTERNAL MEDICINE

## 2024-02-21 PROCEDURE — 120N000001 HC R&B MED SURG/OB

## 2024-02-21 PROCEDURE — 250N000011 HC RX IP 250 OP 636: Performed by: HOSPITALIST

## 2024-02-21 PROCEDURE — 84100 ASSAY OF PHOSPHORUS: CPT | Performed by: HOSPITALIST

## 2024-02-21 PROCEDURE — 250N000013 HC RX MED GY IP 250 OP 250 PS 637: Performed by: HOSPITALIST

## 2024-02-21 RX ORDER — ENOXAPARIN SODIUM 100 MG/ML
40 INJECTION SUBCUTANEOUS EVERY 24 HOURS
Status: DISCONTINUED | OUTPATIENT
Start: 2024-02-21 | End: 2024-02-22

## 2024-02-21 RX ORDER — DEXTROSE MONOHYDRATE 25 G/50ML
25-50 INJECTION, SOLUTION INTRAVENOUS
Status: DISCONTINUED | OUTPATIENT
Start: 2024-02-21 | End: 2024-02-21

## 2024-02-21 RX ORDER — LISINOPRIL 20 MG/1
40 TABLET ORAL DAILY
Status: DISCONTINUED | OUTPATIENT
Start: 2024-02-22 | End: 2024-02-23 | Stop reason: HOSPADM

## 2024-02-21 RX ORDER — NICOTINE POLACRILEX 4 MG
15-30 LOZENGE BUCCAL
Status: DISCONTINUED | OUTPATIENT
Start: 2024-02-21 | End: 2024-02-21

## 2024-02-21 RX ORDER — POTASSIUM CHLORIDE 1500 MG/1
40 TABLET, EXTENDED RELEASE ORAL ONCE
Status: COMPLETED | OUTPATIENT
Start: 2024-02-21 | End: 2024-02-21

## 2024-02-21 RX ADMIN — POTASSIUM & SODIUM PHOSPHATES POWDER PACK 280-160-250 MG 1 PACKET: 280-160-250 PACK at 21:15

## 2024-02-21 RX ADMIN — GABAPENTIN 100 MG: 100 CAPSULE ORAL at 21:15

## 2024-02-21 RX ADMIN — ROSUVASTATIN CALCIUM 20 MG: 20 TABLET, FILM COATED ORAL at 21:20

## 2024-02-21 RX ADMIN — SENNOSIDES AND DOCUSATE SODIUM 2 TABLET: 50; 8.6 TABLET ORAL at 08:16

## 2024-02-21 RX ADMIN — AMLODIPINE BESYLATE 10 MG: 10 TABLET ORAL at 08:16

## 2024-02-21 RX ADMIN — POTASSIUM CHLORIDE 40 MEQ: 1500 TABLET, EXTENDED RELEASE ORAL at 11:43

## 2024-02-21 RX ADMIN — POLYETHYLENE GLYCOL 3350 17 G: 17 POWDER, FOR SOLUTION ORAL at 12:14

## 2024-02-21 RX ADMIN — LEVOTHYROXINE SODIUM 112 MCG: 112 TABLET ORAL at 08:16

## 2024-02-21 RX ADMIN — POTASSIUM & SODIUM PHOSPHATES POWDER PACK 280-160-250 MG 1 PACKET: 280-160-250 PACK at 16:27

## 2024-02-21 RX ADMIN — LISINOPRIL 30 MG: 20 TABLET ORAL at 08:16

## 2024-02-21 RX ADMIN — FUROSEMIDE 20 MG: 20 TABLET ORAL at 08:23

## 2024-02-21 RX ADMIN — GABAPENTIN 100 MG: 100 CAPSULE ORAL at 08:16

## 2024-02-21 RX ADMIN — POTASSIUM & SODIUM PHOSPHATES POWDER PACK 280-160-250 MG 1 PACKET: 280-160-250 PACK at 11:43

## 2024-02-21 RX ADMIN — HYDROCORTISONE SODIUM SUCCINATE 50 MG: 100 INJECTION, POWDER, FOR SOLUTION INTRAMUSCULAR; INTRAVENOUS at 11:42

## 2024-02-21 RX ADMIN — Medication 1 LOZENGE: at 18:03

## 2024-02-21 RX ADMIN — INSULIN GLARGINE 35 UNITS: 100 INJECTION, SOLUTION SUBCUTANEOUS at 08:17

## 2024-02-21 RX ADMIN — ENOXAPARIN SODIUM 40 MG: 40 INJECTION SUBCUTANEOUS at 18:03

## 2024-02-21 ASSESSMENT — ACTIVITIES OF DAILY LIVING (ADL)
ADLS_ACUITY_SCORE: 34
ADLS_ACUITY_SCORE: 33
ADLS_ACUITY_SCORE: 34
ADLS_ACUITY_SCORE: 33

## 2024-02-21 NOTE — PLAN OF CARE
Goal Outcome Evaluation:  DATE & TIME: 02/20-02/21  [id number]    Cognitive Concerns/ Orientation : A&O x4.Calm , cooperative.   BEHAVIOR & AGGRESSION TOOL COLOR: Green  CIWA SCORE: n/a   ABNL VS/O2: VSS on RA  MOBILITY: SBA w/W  PAIN MANAGMENT: Denies  DIET: Mod Carb  BOWEL/BLADDER: Cont B/B  ABNL LAB/BG: See chart  DRAIN/DEVICES: R PIV SL  TELEMETRY RHYTHM: n/a  SKIN: Mirza on BLE. Rash on L&R shin.   TESTS/PROCEDURES:   D/C DATE: Awaiting TCU placement.  Discharge Barriers:   OTHER IMPORTANT INFO: BS AC/HS.

## 2024-02-21 NOTE — PROGRESS NOTES
[facility]    Medicine Progress Note - Hospitalist Service    Date of Admission:  2/13/2024    Assessment & Plan   Coretta Kolb is a 78 year old female who was admitted on 2/13/2024.      Past medical history significant for HTN, HLP, DM2, Diabetic Neuropathy, Diabetic retinopathy, Obesity, Hypothyroidism, MDD with anxiety, Peripheral edema, Venous insufficiency who was registered to short-stay/observation due to hyperglycemia, pyuria, hypertension and encephalopathy.       Patient presented to the ED as her ophthalmology team was concerned with some increased confusion, as well as daughter.  Work-up in the ED revealed that patient's blood glucose was uncontrolled (glucose of 351 and on recheck at 508).  UA was grossly abnormal and concerning for UTI.  BP was elevated at 193/83.  Patient's daughter and family with some more longstanding concern about patient's safety to continue to live independently with her medication issues.     Per Admission H&P, patient with some increased confusion noted by her ophthalmology team earlier on day of presentation.  Daughter had also reported that she has had concerns about confusion in the past few weeks as well.  Given prior concern for patient's ability to safely live independently even 6 months ago before she signed out of her care facility and now medication nonadherence with increasing A1c, suspect this may be a more long-term issue.  Daughter feels the same. 1 fall reported at the end of summer since leaving care facility 6 months ago.     On 2/16 had worsening mentation, lethargy. Had work-up with LP which was negative for bacterial infection, but there was some confusion initially as culture was reported to contain gram + cocci and she was started on broad spectrum abx, IVF and steroids. On 2/17, she had relatively quick transition back to more awake, vibrant mental status with some remaining confusion. Perhaps she was in early myxedema  crisis and was pulled out with initiation of steroid (effectively treating her adrenal insufficiency).     Acute mental status change, possible early myxedema crisis 2/16--resolving  Metabolic encephalopathy-resolving  *Worsening mentation morning of 2/16.  Nurse paged regarding increased lethargy/somnolence. Patient had almost fell while on the toilet as she was falling asleep. She was unable to remain awake.  Glucose level was within normal limits at time of assessment (188) and BP was normotensive.  Assessed patient and had RRT called.  VBG pH of 7.44, pCO2 of 38, pO2 of 42. Strep negative. CXR negative for infiltrate. Procal 0.13. UA without findings of infection  *See House HO note  *LP 2/16 initially with 2+ gram+ cocci (inaccurate report), corrected to report NO growth. Rest of LP not consistent with bacterial infection. Meningitis panel negative. Started on vancomycin, ceftriaxone, ampicillin, decadron 10mg q6h and had rapid transition of mental status AM of 2/17 (see above). Abx discontinued 2/17  *2/16 EEG: toxic/metabolic encephalopathy, no seizures.  *2/16 CT head: no acute intracranial abnormality  *2/17 MRI: no stroke. Does show brain atrophy, chronic small vessel ischemic disease. Meningioma (known since 2014) slightly increased in size.  *2/18 cortisol checked, WNL, but has been receiving steroids for ~36h prior  - ID consulted with erroneous LP culture--Abx discontinued  -General Neurology appreciated, can follow up as outpatient with [facility] or [facility] regarding cognitive decline and tremors  - with her mental status change on 2/16 and improvement noted on 2/17, it was presumed that it may be early myxedema crisis. However, with FT4 of 0.88 (low normal range 0.9) and TSH of 8, this is questionable.   - In addition, there is concern for possible adrenal insufficiency and currently tapering of hydrocortisone stress dose. Last cortisole level check was on 2/18 which was normal, but on steroids.      - has received hydrocortisone 50mg daily x 1 dose today around noon, hold further hydrocortisone at this time  - check am cortisol level on 2/23 after being off hydrocortisone for >24 hrs  - CPAP when sleeping.    - blood CX x2 NGTD  - continue thyroid supplement (resumed on 2/14)  - OT following. On 2/15, her SLUMS was 13/30, reported to be below baseline with significant balance deficits and poor activity tolerance. MACE was 19/30 on 2/20  - Neuropsych referral at discharge.    - TCU on discharge--SW will need to resend referrals     Uncontrolled type 2 diabetes with neuropathy, retinopathy  Hyperglycemia  *Largely secondary to medication nonadherence.  Estimates that she misses her insulin twice per week.  Follows with endocrine through [facility].  Hemoglobin A1c has been increasing on outpatient follow-up; improved to 7.9 during her stay at [facility] and subsequent assisted living facility, now greater than 11.4%  *Patient expressed financial concerns resulting in skipped insulin doses at times.    *2/15: was on Lantus 32 units in AM, humalog 75/25 50u AM and 60u PM  *2/16 AMS and limited PO intake. Humalog mix discontinued, Lantus decreased to 25 units.  -Recommend close follow-up with endocrine   - continue Lantus 35u  qam starting 2/21  - start Aspart 1u/20gm carb TID w/meals  - continue sliding scale insulin   -decreased PTA gabapentin to 100mg BID--continue reduced dose on discharge     Pyuria  *Patient with urinary frequency, pyuria on urinalysis. Symptoms and findings could potentially be secondary to uncontrolled diabetes as well.  *Urine culture with mixed urogenital luisa.    -Stopped ceftriaxone 2/15.       Hypothyroidism  Early myxedema crisis (see above)  -TSH elevated at 8.11 with low free T4 of 0.88.    - see above with treatment with stress dose steroids, continuing thyroid supplement 112mcg daily     Hypophosphatemia  Hypokalemia  Replete per protocol     Depression  *patient no longer  "taking Cymbalta.       Relative hypotension: resolved  Hypertension  - PTA amlodipine 10 mg/d continued  - Increase PTA Lisinopril 40 mg/d  starting 2/22  - BP is currently on higher side  - PRN hydralazine for SBP>180     HLP  -Resumed PTA crestor     Peripheral edema  Venous insufficiency   - resume PTA lasix MWF on 2/20/24     Constipation  Continue senna BID, PRN miralax/suppository     Obesity   Body mass index is 41.57 kg/m .  Increase in all-cause morbidity and mortality.   - Follow up with PCP regarding ongoing management.             Diet: Combination Diet Moderate Consistent Carb (60 g CHO per Meal) Diet; Low Saturated Fat Na <2400mg Diet  Room Service    DVT Prophylaxis: Enoxaparin (Lovenox) subcutaneous--start on 5/21  Rodriguez Catheter: Not present  Lines: None     Cardiac Monitoring: None  Code Status: Full Code      Clinically Significant Risk Factors        # Hypokalemia: Lowest K = 3.3 mmol/L in last 2 days, will replace as needed       # Hypoalbuminemia: Lowest albumin = 3.1 g/dL at 2/16/2024 11:58 AM, will monitor as appropriate            # Severe Obesity: Estimated body mass index is 42.21 kg/m  as calculated from the following:    Height as of this encounter: 1.549 m (5' 1\").    Weight as of this encounter: 101.3 kg (223 lb 6.4 oz).      # Financial/Environmental Concerns: other (see comments) (pt denies issues affording medication but daughter states pt told the doctor in ED she doesn't take her insulin everyday due to cost)         Disposition Plan  likely in 2 days, need to check am cortisol level on 2/23 once off hydrocortisone for at minimum 24 hours     Expected Discharge Date: 02/23/2024, 11:00 AM  Discharge Delays: Placement - TCU    Discharge Comments: Weaning from IV steroids            Carley Mendez MD  Hospitalist Service  [facility]  Securely message with [employer] (more info)  Text page via [employer] Paging/Directory "   ______________________________________________________________________    Interval History   Patient reports she is doing good.   Denies nausea, vomiting or abdominal pain.     Physical Exam   Vital Signs: Temp: 98.3  F (36.8  C) Temp src: Oral BP: (!) 167/83 Pulse: 61   Resp: 16 SpO2: 97 % O2 Device: None (Room air)    Weight: 223 lbs 6.4 oz    General Appearance: Alert, awake and no apparent distress  Respiratory: clear to auscultation bilaterally, no wheezing  Cardiovascular: regular rate and rhythm  GI: soft and non-tender  Skin: warm and dry      Medical Decision Making       50 MINUTES SPENT BY ME on the date of service doing chart review, history, exam, documentation & further activities per the note.      Data     I have personally reviewed the following data over the past 24 hrs:    N/A  \   N/A   / N/A     140 104 14.6 /  169 (H)   3.5 30 (H) 0.62 \       Imaging results reviewed over the past 24 hrs:   No results found for this or any previous visit (from the past 24 hour(s)).

## 2024-02-21 NOTE — PROGRESS NOTES
Care Management Follow Up    Length of Stay (days): 6    Expected Discharge Date: 02/22/2024     Concerns to be Addressed: cognitive/perceptual, compliance issue, discharge planning, medication     Patient plan of care discussed at interdisciplinary rounds: Yes    Anticipated Discharge Disposition: Home, Home Care (possible home care, awaiting OT evaluation)     Anticipated Discharge Services:    Anticipated Discharge DME:      Patient/family educated on Medicare website which has current facility and service quality ratings:    Education Provided on the Discharge Plan:    Patient/Family in Agreement with the Plan: yes    Referrals Placed by CM/SW:    Private pay costs discussed: private room fee at [facility]    Additional Information:  Writer reviewed social work notes from patient's time on [facility].  Appears the plan has been to [facility] and patient has been accepted by [facility] pending bed availability.  Writer called [facility] admissions and left a message stating patient's anticipated discharge date is 2/22.   Now waiting for a call back to know if they have will a vacancy.    Update at 1600 Spoke with Tisha at [facility].  She will have a private room available  for patient on 2/23 and they can accept patient anytime after 1100    NATALY Butler

## 2024-02-21 NOTE — PROGRESS NOTES
Pt here with altered mental status and metabolic encephalopathy due to myxedema. A&O, can be confused and forgetful at times. Generalized weakness. VSS. Denies pain. Continent. Up with A1/GBW. Mod carb diet, thin liquids and pills whole. Plan is for TCU. Report called to receiving nurse, and transferred to 66 this afternoon.

## 2024-02-21 NOTE — PLAN OF CARE
Goal Outcome Evaluation:DATE & TIME: -02/21 3184-3122  Cognitive Concerns/ Orientation : A&O x4.Calm , cooperative.   BEHAVIOR & AGGRESSION TOOL COLOR: Green  CIWA SCORE: n/a   ABNL VS/O2: VSS on RA, /83. Given scheduled BP meds  MOBILITY: SBA /W  PAIN MANAGMENT: Denies  DIET: Mod Carb  BOWEL/BLADDER: Cont B/B  ABNL LAB/BG:K 3.3 replaced, recheck at 1545,Phos 2.3- replacement in progress, recheck in AM  DRAIN/DEVICES: R PIV SL  TELEMETRY RHYTHM: n/a  SKIN: Mirza on BLE. Rash on L&R shin.   TESTS/PROCEDURES:   D/C DATE: Awaiting TCU placement.  OTHER IMPORTANT INFO:  c/o of constipation, given senna and miralax, no results yet,SW following for placement.

## 2024-02-22 ENCOUNTER — APPOINTMENT (OUTPATIENT)
Dept: OCCUPATIONAL THERAPY | Facility: CLINIC | Age: 79
DRG: 637 | End: 2024-02-22
Attending: HOSPITALIST
Payer: MEDICARE

## 2024-02-22 LAB
ANION GAP SERPL CALCULATED.3IONS-SCNC: 7 MMOL/L (ref 7–15)
BUN SERPL-MCNC: 11.2 MG/DL (ref 8–23)
CALCIUM SERPL-MCNC: 8.7 MG/DL (ref 8.8–10.2)
CHLORIDE SERPL-SCNC: 105 MMOL/L (ref 98–107)
CREAT SERPL-MCNC: 0.61 MG/DL (ref 0.51–0.95)
DEPRECATED HCO3 PLAS-SCNC: 28 MMOL/L (ref 22–29)
EGFRCR SERPLBLD CKD-EPI 2021: >90 ML/MIN/1.73M2
ERYTHROCYTE [DISTWIDTH] IN BLOOD BY AUTOMATED COUNT: 13.3 % (ref 10–15)
GLUCOSE BLDC GLUCOMTR-MCNC: 107 MG/DL (ref 70–99)
GLUCOSE BLDC GLUCOMTR-MCNC: 140 MG/DL (ref 70–99)
GLUCOSE BLDC GLUCOMTR-MCNC: 164 MG/DL (ref 70–99)
GLUCOSE BLDC GLUCOMTR-MCNC: 206 MG/DL (ref 70–99)
GLUCOSE SERPL-MCNC: 124 MG/DL (ref 70–99)
HCT VFR BLD AUTO: 40.4 % (ref 35–47)
HGB BLD-MCNC: 13.7 G/DL (ref 11.7–15.7)
MAGNESIUM SERPL-MCNC: 2.1 MG/DL (ref 1.7–2.3)
MCH RBC QN AUTO: 29.8 PG (ref 26.5–33)
MCHC RBC AUTO-ENTMCNC: 33.9 G/DL (ref 31.5–36.5)
MCV RBC AUTO: 88 FL (ref 78–100)
PHOSPHATE SERPL-MCNC: 2.4 MG/DL (ref 2.5–4.5)
PLATELET # BLD AUTO: 210 10E3/UL (ref 150–450)
POTASSIUM SERPL-SCNC: 3.3 MMOL/L (ref 3.4–5.3)
POTASSIUM SERPL-SCNC: 3.7 MMOL/L (ref 3.4–5.3)
RBC # BLD AUTO: 4.6 10E6/UL (ref 3.8–5.2)
SODIUM SERPL-SCNC: 140 MMOL/L (ref 135–145)
WBC # BLD AUTO: 11.4 10E3/UL (ref 4–11)

## 2024-02-22 PROCEDURE — 84132 ASSAY OF SERUM POTASSIUM: CPT | Performed by: INTERNAL MEDICINE

## 2024-02-22 PROCEDURE — 85014 HEMATOCRIT: CPT | Performed by: INTERNAL MEDICINE

## 2024-02-22 PROCEDURE — 97530 THERAPEUTIC ACTIVITIES: CPT | Mod: GO

## 2024-02-22 PROCEDURE — 250N000013 HC RX MED GY IP 250 OP 250 PS 637: Performed by: INTERNAL MEDICINE

## 2024-02-22 PROCEDURE — 36415 COLL VENOUS BLD VENIPUNCTURE: CPT | Performed by: INTERNAL MEDICINE

## 2024-02-22 PROCEDURE — 250N000013 HC RX MED GY IP 250 OP 250 PS 637: Performed by: PHYSICIAN ASSISTANT

## 2024-02-22 PROCEDURE — 84100 ASSAY OF PHOSPHORUS: CPT | Performed by: INTERNAL MEDICINE

## 2024-02-22 PROCEDURE — 250N000013 HC RX MED GY IP 250 OP 250 PS 637: Performed by: HOSPITALIST

## 2024-02-22 PROCEDURE — 80048 BASIC METABOLIC PNL TOTAL CA: CPT | Performed by: INTERNAL MEDICINE

## 2024-02-22 PROCEDURE — 83735 ASSAY OF MAGNESIUM: CPT | Performed by: HOSPITALIST

## 2024-02-22 PROCEDURE — 99232 SBSQ HOSP IP/OBS MODERATE 35: CPT | Performed by: INTERNAL MEDICINE

## 2024-02-22 PROCEDURE — 97535 SELF CARE MNGMENT TRAINING: CPT | Mod: GO

## 2024-02-22 PROCEDURE — 120N000001 HC R&B MED SURG/OB

## 2024-02-22 PROCEDURE — 250N000011 HC RX IP 250 OP 636: Performed by: INTERNAL MEDICINE

## 2024-02-22 RX ORDER — POTASSIUM CHLORIDE 1500 MG/1
40 TABLET, EXTENDED RELEASE ORAL ONCE
Status: COMPLETED | OUTPATIENT
Start: 2024-02-22 | End: 2024-02-22

## 2024-02-22 RX ORDER — ENOXAPARIN SODIUM 100 MG/ML
40 INJECTION SUBCUTANEOUS EVERY 12 HOURS
Status: DISCONTINUED | OUTPATIENT
Start: 2024-02-22 | End: 2024-02-23 | Stop reason: HOSPADM

## 2024-02-22 RX ORDER — ACYCLOVIR 400 MG/1
400 TABLET ORAL 3 TIMES DAILY
Status: DISCONTINUED | OUTPATIENT
Start: 2024-02-22 | End: 2024-02-23 | Stop reason: HOSPADM

## 2024-02-22 RX ADMIN — AMLODIPINE BESYLATE 10 MG: 10 TABLET ORAL at 09:25

## 2024-02-22 RX ADMIN — ROSUVASTATIN CALCIUM 20 MG: 20 TABLET, FILM COATED ORAL at 21:36

## 2024-02-22 RX ADMIN — POTASSIUM & SODIUM PHOSPHATES POWDER PACK 280-160-250 MG 1 PACKET: 280-160-250 PACK at 21:36

## 2024-02-22 RX ADMIN — INSULIN GLARGINE 35 UNITS: 100 INJECTION, SOLUTION SUBCUTANEOUS at 09:35

## 2024-02-22 RX ADMIN — ACYCLOVIR 400 MG: 400 TABLET ORAL at 21:36

## 2024-02-22 RX ADMIN — ACYCLOVIR 400 MG: 400 TABLET ORAL at 10:54

## 2024-02-22 RX ADMIN — ENOXAPARIN SODIUM 40 MG: 40 INJECTION SUBCUTANEOUS at 21:36

## 2024-02-22 RX ADMIN — GABAPENTIN 100 MG: 100 CAPSULE ORAL at 09:25

## 2024-02-22 RX ADMIN — ACYCLOVIR 400 MG: 400 TABLET ORAL at 17:36

## 2024-02-22 RX ADMIN — LEVOTHYROXINE SODIUM 112 MCG: 112 TABLET ORAL at 09:25

## 2024-02-22 RX ADMIN — ENOXAPARIN SODIUM 40 MG: 40 INJECTION SUBCUTANEOUS at 10:54

## 2024-02-22 RX ADMIN — POTASSIUM & SODIUM PHOSPHATES POWDER PACK 280-160-250 MG 1 PACKET: 280-160-250 PACK at 17:36

## 2024-02-22 RX ADMIN — LISINOPRIL 40 MG: 20 TABLET ORAL at 09:25

## 2024-02-22 RX ADMIN — GABAPENTIN 100 MG: 100 CAPSULE ORAL at 21:36

## 2024-02-22 RX ADMIN — POTASSIUM CHLORIDE 40 MEQ: 1500 TABLET, EXTENDED RELEASE ORAL at 13:39

## 2024-02-22 RX ADMIN — SENNOSIDES AND DOCUSATE SODIUM 2 TABLET: 50; 8.6 TABLET ORAL at 09:25

## 2024-02-22 RX ADMIN — POTASSIUM & SODIUM PHOSPHATES POWDER PACK 280-160-250 MG 1 PACKET: 280-160-250 PACK at 13:39

## 2024-02-22 ASSESSMENT — ACTIVITIES OF DAILY LIVING (ADL)
ADLS_ACUITY_SCORE: 34
ADLS_ACUITY_SCORE: 33
ADLS_ACUITY_SCORE: 34
ADLS_ACUITY_SCORE: 34
ADLS_ACUITY_SCORE: 33
ADLS_ACUITY_SCORE: 34
ADLS_ACUITY_SCORE: 33
ADLS_ACUITY_SCORE: 34
ADLS_ACUITY_SCORE: 33
ADLS_ACUITY_SCORE: 33
ADLS_ACUITY_SCORE: 34
ADLS_ACUITY_SCORE: 33
ADLS_ACUITY_SCORE: 34

## 2024-02-22 NOTE — PROGRESS NOTES
Care Management Follow Up    Length of Stay (days): 7    Expected Discharge Date: 02/23/2024     Concerns to be Addressed: cognitive/perceptual, compliance issue, discharge planning, medication     Patient plan of care discussed at interdisciplinary rounds: Yes    Anticipated Discharge Disposition: Home, Home Care (possible home care, awaiting OT evaluation)     Anticipated Discharge Services:    Anticipated Discharge DME:      Patient/family educated on Medicare website which has current facility and service quality ratings:    Education Provided on the Discharge Plan: yes   Patient/Family in Agreement with the Plan: yes    Referrals Placed by CM/SW:  [facility] contacted [facility] to inquire about openings as this is pt's first choice. Vielka in admissions has indicated that they do not have current openings in their TCU. Pt updated.  Private pay costs discussed: transportation.    Additional Information:  [facility] contacted Tisha with [facility] and confirmed pts room for tomorrow . They will be ready to admit pt after 11:00am on 2/23/24.  [facility] spoke with pt's daughter, Janet, regarding discharge plan for tomorrow to [facility] and she is agreeable.  [facility] spoke with pt and daughter regarding transnport and a tentative ride is scheduled via [facility] w/c between 13:36-14:21 Friday.  ..PAS-RR    D: Per [facility] regulation, [facility] completed and submitted PAS-RR to [place] [employer] Direct Connect via the [employer].  PAS-RR confirmation # is : [id number]    P: Further questions may be directed to [employer] at #[phone], option #4 for PAS-RR staff.      BERENICE Wasserman

## 2024-02-22 NOTE — PLAN OF CARE
Goal Outcome Evaluation:       DATE & TIME: 02/20/24  9075-1405   Cognitive Concerns/ Orientation : A&O x4.Calm , cooperative.   BEHAVIOR & AGGRESSION TOOL COLOR: Green   ABNL VS/O2: VSS on RA, /80.   MOBILITY: SBA G/W- walked hallways x1, to help with constipation. Refused offer to walk again  PAIN MANAGMENT: Denies  DIET: Mod Carb, carb counting.   BOWEL/BLADDER: Cont B/B, up to Br. BM x1 this shift, pt didn't let nursing staff assess BM but said it was adequate. Refused HS senna  ABNL LAB/BG:K 3.5, Phos 2.3- replaced, recheck in AM. , 252  DRAIN/DEVICES: PIV dislodged, MD ok'd to not have IV tonight. Reassess necessity in AM  TELEMETRY RHYTHM: n/a  SKIN: Mirza on BLE. Rash on L&R shin.   TESTS/PROCEDURES: none  D/C DATE: Awaiting TCU placement.  OTHER IMPORTANT INFO: [facility] following for placement.

## 2024-02-22 NOTE — PROVIDER NOTIFICATION
MD Notification    Notified Person: MD    Notified Person Name: Sandie MD    Notification Date/Time: 2126 2/21/24    Notification Interaction: [employer] messaging    Purpose of Notification: lost IV, hard stick. No IV meds ordered, BG is under control. can we leave PIV out tonight?     Orders Received: yes    Comments:

## 2024-02-22 NOTE — PROGRESS NOTES
Informal Recommendations Note    I was called by  on 02/21/24 at  6:24 PM to provide input for Coretta Kolb. The nature of this request for input does not permit comprehensive review of health records or patient interview.  I was not requested or am not able to personally examine the patient at this time.    Recommendation regarding Steroid Management.     Coretta is a 78 year old female who HTN, HLP, DM2, Diabetic Neuropathy, Diabetic retinopathy, Obesity, Hypothyroidism, MDD with anxiety, Peripheral edema, Venous insufficiency who was registered to short-stay/observation due to hyperglycemia, pyuria, hypertension and encephalopathy.       No history of prior steroid use.     There were initial concerns of Meningitis. Patient was started on Dexamethasone Premptively(10 MG on 16th and 17th Feb 2024)    Hydrocortisone 100mg q 8h ( 17th and 18th) ,   Hydrocortisone 50 q8h (19th and 20th)   Hydrocortisone 50 mg every day (21st - onwards)       Patient has not been Hypotensive, no hypoglycemia or any electrolyte distrubance to hint towards Adrenal insufficiency.     The Altered mentation likely 2/2 UTI and hyperglycemia. Per the provider the mentation is improving now.     Based on the information provided, my recommendations are as follows:     - No reason to suspect adrenal insufficiency, would recommend to stop the steroid from tomorrow.   - no need to check cortisol.   - If the patient becomes hypotensive and worsening mentation, can send 8 am cortisol       These recommendations are not intended to take the place of the care team's clinical judgement, which should always be utilized to provide the most appropriate care to meet the unique needs of each patient.     Discussed with on call attending   Robby Montemayor MD

## 2024-02-22 NOTE — PLAN OF CARE
DATE & TIME: 02/20 6042-2784 Cognitive Concerns/ Orientation : A&O x4.Calm , cooperative.   BEHAVIOR & AGGRESSION TOOL COLOR: Green   ABNL VS/O2: VSS on RA, /83. Given scheduled BP meds  MOBILITY: SBA G/W- walked hallways x1, to help with constipation. Refused offer to walk again  PAIN MANAGMENT: Denies  DIET: Mod Carb, carb counting.   BOWEL/BLADDER: Cont B/B, up to Br. BM x1 this shift, pt didn't let nursing staff assess BM but said it was adequate. Refused HS senna  ABNL LAB/BG:K 3.5, Phos 2.3- replaced, recheck in AM. , 252  DRAIN/DEVICES: PIV dislodged, MD ok'd to not have IV tonight. Reassess necessity in AM  TELEMETRY RHYTHM: n/a  SKIN: Mirza on BLE. Rash on L&R shin.   TESTS/PROCEDURES: none  D/C DATE: Awaiting TCU placement.  OTHER IMPORTANT INFO: [facility] following for placement.

## 2024-02-22 NOTE — PROGRESS NOTES
[facility]    Medicine Progress Note - Hospitalist Service    Date of Admission:  2/13/2024    Assessment & Plan   Coretta Kolb is a 78 year old female who was admitted on 2/13/2024.      Past medical history significant for HTN, HLP, DM2, Diabetic Neuropathy, Diabetic retinopathy, Obesity, Hypothyroidism, MDD with anxiety, Peripheral edema, Venous insufficiency who was registered to short-stay/observation due to hyperglycemia, pyuria, hypertension and encephalopathy.       Patient presented to the ED as her ophthalmology team was concerned with some increased confusion, as well as daughter.  Work-up in the ED revealed that patient's blood glucose was uncontrolled (glucose of 351 and on recheck at 508).  UA was grossly abnormal and concerning for UTI.  BP was elevated at 193/83.  Patient's daughter and family with some more longstanding concern about patient's safety to continue to live independently with her medication issues.     Per Admission H&P, patient with some increased confusion noted by her ophthalmology team earlier on day of presentation.  Daughter had also reported that she has had concerns about confusion in the past few weeks as well.  Given prior concern for patient's ability to safely live independently even 6 months ago before she signed out of her care facility and now medication nonadherence with increasing A1c, suspect this may be a more long-term issue.  Daughter feels the same. 1 fall reported at the end of summer since leaving care facility 6 months ago.     On 2/16 had worsening mentation, lethargy. Had work-up with LP which was negative for bacterial infection, but there was some confusion initially as culture was reported to contain gram + cocci and she was started on broad spectrum abx, IVF and steroids. On 2/17, she had relatively quick transition back to more awake, vibrant mental status with some remaining confusion. Perhaps she was in early myxedema  crisis and was pulled out with initiation of steroid (effectively treating her adrenal insufficiency).     Acute mental status change, possible early myxedema crisis 2/16--resolving  Metabolic encephalopathy-resolving  *Worsening mentation morning of 2/16.  Nurse paged regarding increased lethargy/somnolence. Patient had almost fell while on the toilet as she was falling asleep. She was unable to remain awake.  Glucose level was within normal limits at time of assessment (188) and BP was normotensive.  Assessed patient and had RRT called.  VBG pH of 7.44, pCO2 of 38, pO2 of 42. Strep negative. CXR negative for infiltrate. Procal 0.13. UA without findings of infection  *See House HO note  *LP 2/16 initially with 2+ gram+ cocci (inaccurate report), corrected to report NO growth. Rest of LP not consistent with bacterial infection. Meningitis panel negative. Started on vancomycin, ceftriaxone, ampicillin, decadron 10mg q6h and had rapid transition of mental status AM of 2/17 (see above). Abx discontinued 2/17  *2/16 EEG: toxic/metabolic encephalopathy, no seizures.  *2/16 CT head: no acute intracranial abnormality  *2/17 MRI: no stroke. Does show brain atrophy, chronic small vessel ischemic disease. Meningioma (known since 2014) slightly increased in size.  *2/18 cortisol checked, WNL, but has been receiving steroids for ~36h prior  - ID consulted with erroneous LP culture--Abx discontinued  -General Neurology appreciated, can follow up as outpatient with [facility] or [facility] regarding cognitive decline and tremors  - with her mental status change on 2/16 and improvement noted on 2/17, it was presumed that it may be early myxedema crisis. However, with FT4 of 0.88 (low normal range 0.9) and TSH of 8, this is questionable.   - In addition, there is concern for possible adrenal insufficiency and currently tapering of hydrocortisone stress dose. Last cortisole level check was on 2/18 which was normal, but on steroids.  Discussed informally with [facility] endocrinology fellow on 2/21, low suspicion for adrenal insufficiency. Recommended stopping hydrocortisone and check cortisol level after 24 hrs of being off hydrocortisone.     - has received hydrocortisone 50mg daily x 1 dose on 2/21 around noon, hold further hydrocortisone at this time  - will check am cortisol level on 2/23 after being off hydrocortisone for >24 hrs  - CPAP when sleeping.    - blood CX x2 NGTD  - continue thyroid supplement (resumed on 2/14)  - OT following. On 2/15, her SLUMS was 13/30, reported to be below baseline with significant balance deficits and poor activity tolerance. MACE was 19/30 on 2/20  - Neuropsych referral at discharge.    - TCU on discharge--SW will need to resend referrals     Uncontrolled type 2 diabetes with neuropathy, retinopathy  Hyperglycemia  *Largely secondary to medication nonadherence.  Estimates that she misses her insulin twice per week.  Follows with endocrine through [employer].  Hemoglobin A1c has been increasing on outpatient follow-up; improved to 7.9 during her stay at [facility] and subsequent assisted living facility, now greater than 11.4%  *Patient expressed financial concerns resulting in skipped insulin doses at times.    *2/15: was on Lantus 32 units in AM, humalog 75/25 50u AM and 60u PM  *2/16 AMS and limited PO intake. Humalog mix discontinued, Lantus dose decreased  - Recommend close follow-up with endocrine after discharge  - continue Lantus 35u  qam starting 2/21  - start Aspart 1u/20gm carb TID w/meals on 2/21, continue at this time  - continue sliding scale insulin   -decreased PTA gabapentin to 100mg BID--continue reduced dose on discharge     Pyuria  *Patient with urinary frequency, pyuria on urinalysis. Symptoms and findings could potentially be secondary to uncontrolled diabetes as well.  *Urine culture with mixed urogenital luisa.    -Stopped ceftriaxone 2/15.       Hypothyroidism  Questionable early  "myxedema crisis (see above)  -TSH elevated at 8.11 with low free T4 of 0.88.    - see above with treatment with stress dose steroids, continuing thyroid supplement 112mcg daily    Cold sores   C/o cold sores on upper lip on 2/22.  - Acyclovir 400mg TID x 5-7 days     Hypophosphatemia  Hypokalemia  Replete per protocol     Depression  *patient no longer taking Cymbalta.       Relative hypotension: resolved  Hypertension  - PTA amlodipine 10 mg/d continued  - Increase PTA Lisinopril 40 mg/d  starting 2/22  - PRN hydralazine for SBP>180     HLP  -Resumed PTA crestor     Peripheral edema  Venous insufficiency   - resume PTA lasix MWF on 2/20/24     Constipation  Continue senna BID, PRN miralax/suppository     Obesity   Body mass index is 41.57 kg/m .  Increase in all-cause morbidity and mortality.   - Follow up with PCP regarding ongoing management.             Diet: Combination Diet Moderate Consistent Carb (60 g CHO per Meal) Diet; Low Saturated Fat Na <2400mg Diet  Room Service    DVT Prophylaxis: Enoxaparin (Lovenox) subcutaneous--start on 2/21  Rodriguez Catheter: Not present  Lines: None     Cardiac Monitoring: None  Code Status: Full Code      Clinically Significant Risk Factors        # Hypokalemia: Lowest K = 3.3 mmol/L in last 2 days, will replace as needed       # Hypoalbuminemia: Lowest albumin = 3.1 g/dL at 2/16/2024 11:58 AM, will monitor as appropriate            # Severe Obesity: Estimated body mass index is 42.21 kg/m  as calculated from the following:    Height as of this encounter: 1.549 m (5' 1\").    Weight as of this encounter: 101.3 kg (223 lb 6.4 oz).        # Financial/Environmental Concerns: other (see comments) (pt denies issues affording medication but daughter states pt told the doctor in ED she doesn't take her insulin everyday due to cost)         Disposition Plan  likely tomorrow, need to check am cortisol level on 2/23 once off hydrocortisone for at minimum 24 hours     Expected Discharge " Date: 02/23/2024, 11:00 AM  Discharge Delays: Placement - TCU    Discharge Comments: Weaning from IV steroids            Carley Mendez MD  Hospitalist Service  [facility]  Securely message with [employer] (more info)  Text page via webtide Paging/Directory   ______________________________________________________________________    Interval History   Patient states she is doing good. Denies n/v or abdominal pain.   C/o cold sores for on her lips and tongue. Could not appreciate much of a lesion on her tongue during evaluation.   She is afebrile.     Physical Exam   Vital Signs: Temp: 98.1  F (36.7  C) Temp src: Oral BP: (!) 157/83 Pulse: 65   Resp: 18 SpO2: 95 % O2 Device: None (Room air)    Weight: 223 lbs 6.4 oz    General Appearance: Alert, awake and no apparent distress  Respiratory: clear to auscultation bilaterally, no wheezing  Cardiovascular: regular rate and rhythm  GI: soft and non-tender  Skin: warm and dry      Medical Decision Making       45 MINUTES SPENT BY ME on the date of service doing chart review, history, exam, documentation & further activities per the note.      Data     I have personally reviewed the following data over the past 24 hrs:    N/A  \   N/A   / N/A     N/A N/A N/A /  107 (H)   3.5 N/A N/A \       Imaging results reviewed over the past 24 hrs:   No results found for this or any previous visit (from the past 24 hour(s)).

## 2024-02-22 NOTE — PLAN OF CARE
Goal Outcome Evaluation:      Plan of Care Reviewed With: patient  Summary: Presented with hyperglycemia, pyuria , HTN, and encephalopathy  DATE & TIME: 02/22/24, [id number]   Cognitive Concerns/ Orientation : A&O x4.Calm , cooperative.   BEHAVIOR & AGGRESSION TOOL COLOR: Green   ABNL VS/O2: VSS on Ra ex. BP elevated 160s-scheduled BP meds given.   MOBILITY: SBA G/W- walked hallways today with staff.   PAIN MANAGMENT: Denies  DIET: Mod Carb, carb counting. Tolerating  BOWEL/BLADDER: Cont B/B, had a Bm last evening, senna given.   ABNL LAB/BG:K 3.3 (replaced, re-check at 1800), Phos 2.3 (replaced, recheck in AM). , 164.   DRAIN/DEVICES: No PIV - MD okay with this  TELEMETRY RHYTHM: n/a  SKIN: Mirza on BLE. Rash on L&R shin- per patient has improved significantly  TESTS/PROCEDURES: none  D/C DATE: Awaiting TCU placement. Possibly tomorrow if cortisol levels WDL.   OTHER IMPORTANT INFO: SW following for placement. Neuros intact/unchanged.

## 2024-02-23 ENCOUNTER — APPOINTMENT (OUTPATIENT)
Dept: OCCUPATIONAL THERAPY | Facility: CLINIC | Age: 79
DRG: 637 | End: 2024-02-23
Attending: HOSPITALIST
Payer: MEDICARE

## 2024-02-23 VITALS
RESPIRATION RATE: 16 BRPM | BODY MASS INDEX: 42.18 KG/M2 | HEART RATE: 96 BPM | SYSTOLIC BLOOD PRESSURE: 166 MMHG | TEMPERATURE: 98.2 F | HEIGHT: 61 IN | OXYGEN SATURATION: 95 % | WEIGHT: 223.4 LBS | DIASTOLIC BLOOD PRESSURE: 76 MMHG

## 2024-02-23 LAB
CORTIS SERPL-MCNC: 11.9 UG/DL
CREAT SERPL-MCNC: 0.67 MG/DL (ref 0.51–0.95)
EGFRCR SERPLBLD CKD-EPI 2021: 90 ML/MIN/1.73M2
GLUCOSE BLDC GLUCOMTR-MCNC: 163 MG/DL (ref 70–99)
GLUCOSE BLDC GLUCOMTR-MCNC: 193 MG/DL (ref 70–99)
PHOSPHATE SERPL-MCNC: 2.8 MG/DL (ref 2.5–4.5)
PLATELET # BLD AUTO: 241 10E3/UL (ref 150–450)
POTASSIUM SERPL-SCNC: 3.7 MMOL/L (ref 3.4–5.3)

## 2024-02-23 PROCEDURE — 85049 AUTOMATED PLATELET COUNT: CPT | Performed by: HOSPITALIST

## 2024-02-23 PROCEDURE — 250N000013 HC RX MED GY IP 250 OP 250 PS 637: Performed by: PHYSICIAN ASSISTANT

## 2024-02-23 PROCEDURE — 36415 COLL VENOUS BLD VENIPUNCTURE: CPT | Performed by: INTERNAL MEDICINE

## 2024-02-23 PROCEDURE — 82565 ASSAY OF CREATININE: CPT | Performed by: HOSPITALIST

## 2024-02-23 PROCEDURE — 250N000013 HC RX MED GY IP 250 OP 250 PS 637: Performed by: HOSPITALIST

## 2024-02-23 PROCEDURE — 97530 THERAPEUTIC ACTIVITIES: CPT | Mod: GO

## 2024-02-23 PROCEDURE — 250N000011 HC RX IP 250 OP 636: Performed by: INTERNAL MEDICINE

## 2024-02-23 PROCEDURE — 250N000012 HC RX MED GY IP 250 OP 636 PS 637: Performed by: HOSPITALIST

## 2024-02-23 PROCEDURE — 82533 TOTAL CORTISOL: CPT | Performed by: INTERNAL MEDICINE

## 2024-02-23 PROCEDURE — 84100 ASSAY OF PHOSPHORUS: CPT | Performed by: INTERNAL MEDICINE

## 2024-02-23 PROCEDURE — 97535 SELF CARE MNGMENT TRAINING: CPT | Mod: GO

## 2024-02-23 PROCEDURE — 84132 ASSAY OF SERUM POTASSIUM: CPT | Performed by: INTERNAL MEDICINE

## 2024-02-23 PROCEDURE — 99239 HOSP IP/OBS DSCHRG MGMT >30: CPT | Performed by: INTERNAL MEDICINE

## 2024-02-23 PROCEDURE — 250N000013 HC RX MED GY IP 250 OP 250 PS 637: Performed by: INTERNAL MEDICINE

## 2024-02-23 RX ORDER — ACETAMINOPHEN 325 MG/1
650 TABLET ORAL EVERY 6 HOURS PRN
DISCHARGE
Start: 2024-02-23

## 2024-02-23 RX ORDER — INSULIN LISPRO 100 [IU]/ML
4 INJECTION, SOLUTION INTRAVENOUS; SUBCUTANEOUS
DISCHARGE
Start: 2024-02-23

## 2024-02-23 RX ORDER — HYDRALAZINE HYDROCHLORIDE 10 MG/1
10 TABLET, FILM COATED ORAL 3 TIMES DAILY
DISCHARGE
Start: 2024-02-23

## 2024-02-23 RX ORDER — LISINOPRIL 40 MG/1
40 TABLET ORAL DAILY
DISCHARGE
Start: 2024-02-24

## 2024-02-23 RX ORDER — INSULIN GLARGINE 100 [IU]/ML
35 INJECTION, SOLUTION SUBCUTANEOUS EVERY MORNING
DISCHARGE
Start: 2024-02-23

## 2024-02-23 RX ORDER — GABAPENTIN 100 MG/1
100 CAPSULE ORAL 2 TIMES DAILY
DISCHARGE
Start: 2024-02-23

## 2024-02-23 RX ORDER — ACYCLOVIR 400 MG/1
400 TABLET ORAL 3 TIMES DAILY
DISCHARGE
Start: 2024-02-23 | End: 2024-02-27

## 2024-02-23 RX ADMIN — SENNOSIDES AND DOCUSATE SODIUM 2 TABLET: 50; 8.6 TABLET ORAL at 09:03

## 2024-02-23 RX ADMIN — INSULIN GLARGINE 35 UNITS: 100 INJECTION, SOLUTION SUBCUTANEOUS at 12:13

## 2024-02-23 RX ADMIN — GABAPENTIN 100 MG: 100 CAPSULE ORAL at 09:03

## 2024-02-23 RX ADMIN — LISINOPRIL 40 MG: 20 TABLET ORAL at 09:03

## 2024-02-23 RX ADMIN — AMLODIPINE BESYLATE 10 MG: 10 TABLET ORAL at 09:03

## 2024-02-23 RX ADMIN — ENOXAPARIN SODIUM 40 MG: 40 INJECTION SUBCUTANEOUS at 09:04

## 2024-02-23 RX ADMIN — FUROSEMIDE 20 MG: 20 TABLET ORAL at 09:17

## 2024-02-23 RX ADMIN — LEVOTHYROXINE SODIUM 112 MCG: 112 TABLET ORAL at 09:03

## 2024-02-23 RX ADMIN — ACYCLOVIR 400 MG: 400 TABLET ORAL at 09:03

## 2024-02-23 ASSESSMENT — ACTIVITIES OF DAILY LIVING (ADL)
ADLS_ACUITY_SCORE: 33
ADLS_ACUITY_SCORE: 33
ADLS_ACUITY_SCORE: 34
ADLS_ACUITY_SCORE: 33
ADLS_ACUITY_SCORE: 34
ADLS_ACUITY_SCORE: 33

## 2024-02-23 NOTE — DISCHARGE SUMMARY
"[facility]  Hospitalist Discharge Summary      Date of Admission:  2/13/2024  Date of Discharge:  2/23/2024  Discharging Provider: Carley Mendez MD  Discharge Service: Hospitalist Service    Discharge Diagnoses   See below    Clinically Significant Risk Factors     # Severe Obesity: Estimated body mass index is 42.21 kg/m  as calculated from the following:    Height as of this encounter: 1.549 m (5' 1\").    Weight as of this encounter: 101.3 kg (223 lb 6.4 oz).       Follow-ups Needed After Discharge   Follow-up Appointments     Follow Up and recommended labs and tests      Follow up with senior living physician.  The following labs/tests are   recommended: basic metabolic panel and cbc in a week.        Follow-up and recommended labs and tests       Keep your upcoming [facility] appointments;     Appointment: 03/05/2024 12:40 PM with Myra Tesfaye DO   Location: [facility]   Address: [address] [place] [address]   Phone: [phone]    Appointment: 03/11/2024 1:15 PM with Jennie Woods MD   Location: [facility] [place]   Address: [address] [place] [address]   Phone: [phone]            Unresulted Labs Ordered in the Past 30 Days of this Admission       Date and Time Order Name Status Description    2/23/2024 10:56 AM Potassium In process     2/17/2024 10:25 AM Arbovirus Antibodies, IgG and IgM, Serum In process             Discharge Disposition   Discharged to short-term care facility  Condition at discharge: Stable    Hospital Course   Coretta Kolb is a 78 year old female who was admitted on 2/13/2024.      Past medical history significant for HTN, HLP, DM2, Diabetic Neuropathy, Diabetic retinopathy, Obesity, Hypothyroidism, MDD with anxiety, Peripheral edema, Venous insufficiency who was registered to short-stay/observation due to hyperglycemia, pyuria, hypertension and " encephalopathy.       Patient presented to the ED as her ophthalmology team was concerned with some increased confusion, as well as daughter.  Work-up in the ED revealed that patient's blood glucose was uncontrolled (glucose of 351 and on recheck at 508).  UA was grossly abnormal and concerning for UTI.  BP was elevated at 193/83.  Patient's daughter and family with some more longstanding concern about patient's safety to continue to live independently with her medication issues.     Per Admission H&P, patient with some increased confusion noted by her ophthalmology team earlier on day of presentation.  Daughter had also reported that she has had concerns about confusion in the past few weeks as well.  Given prior concern for patient's ability to safely live independently even 6 months ago before she signed out of her care facility and now medication nonadherence with increasing A1c, suspect this may be a more long-term issue.  Daughter feels the same. 1 fall reported at the end of summer since leaving care facility 6 months ago.     On 2/16 had worsening mentation, lethargy. Had work-up with LP which was negative for bacterial infection, but there was some confusion initially as culture was reported to contain gram + cocci and she was started on broad spectrum abx, IVF and steroids. On 2/17, she had relatively quick transition back to more awake, vibrant mental status with some remaining confusion. Perhaps she was in early myxedema crisis and was pulled out with initiation of steroid (effectively treating her adrenal insufficiency).     Acute mental status change, possible /questionable early myxedema crisis 2/16--resolved  Metabolic encephalopathy-resolving  *Worsening mentation morning of 2/16.  Nurse paged regarding increased lethargy/somnolence. Patient had almost fell while on the toilet as she was falling asleep. She was unable to remain awake.  Glucose level was within normal limits at time of assessment  (188) and BP was normotensive.  Assessed patient and had RRT called.  VBG pH of 7.44, pCO2 of 38, pO2 of 42. Strep negative. CXR negative for infiltrate. Procal 0.13. UA without findings of infection  *See House HO note  *LP 2/16 initially with 2+ gram+ cocci (inaccurate report), corrected to report NO growth. Rest of LP not consistent with bacterial infection. Meningitis panel negative. Started on vancomycin, ceftriaxone, ampicillin, decadron 10mg q6h and had rapid transition of mental status AM of 2/17 (see above). Abx discontinued 2/17  *2/16 EEG: toxic/metabolic encephalopathy, no seizures.  *2/16 CT head: no acute intracranial abnormality  *2/17 MRI: no stroke. Does show brain atrophy, chronic small vessel ischemic disease. Meningioma (known since 2014) slightly increased in size.  *2/18 cortisol checked, WNL, but has been receiving steroids for ~36h prior  - ID consulted with erroneous LP culture--Abx discontinued  -General Neurology appreciated, can follow up as [facility] with [facility] or [facility] regarding cognitive decline and tremors  - with her mental status change on 2/16 and improvement noted on 2/17, it was presumed that it may be early myxedema crisis. However, with FT4 of 0.88 (low normal range 0.9) and TSH of 8, this is questionable.   - In addition, there is concern for possible adrenal insufficiency and  tapering of hydrocortisone stress dose. Last cortisole level check was on 2/18 which was normal, but on steroids. Discussed informally with [facility] endocrinology fellow on 2/21, low suspicion for adrenal insufficiency. Recommended stopping hydrocortisone and check cortisol level after 24 hrs of being off hydrocortisone.   -Hydrocortisone stopped on 2/21  - am Cortisol check 11 on 2/23 and rules out adrenal insufficiency  - patient will continue PTA thryoid supplement and check TSH in ~8 weeks. Medication adherence strongly advised  - OT following. On 2/15, her SLUMS was 13/30, reported to be  below baseline with significant balance deficits and poor activity tolerance. MACE was 19/30 on 2/20  - Neuropsych referral at discharge and can be arranged through PCP    - discharging to TCU     Uncontrolled type 2 diabetes with neuropathy, retinopathy  Hyperglycemia  *Largely secondary to medication nonadherence.  Estimates that she misses her insulin twice per week.  Follows with endocrine through [employer].  Hemoglobin A1c has been increasing on outpatient follow-up; improved to 7.9 during her stay at [facility] and subsequent assisted living facility, now greater than 11.4%  *Patient expressed financial concerns resulting in skipped insulin doses at times.    *2/15: was on Lantus 32 units in AM, humalog 75/25 50u AM and 60u PM  *2/16 AMS and limited PO intake. Humalog mix discontinued, Lantus dose decreased  - Recommend close follow-up with endocrine after discharge  - will discharge on Lantus 35u  qam  - added Lispiro 4units TID w/meals to hold for BG <100  - resume PTA sliding scale insulin lispiro at discharge  - humalog Mix 75/25 discontinued  - has endocrinology follow up arranged in March and will defer further adjustment at that time  - QID BG checks at TCU  -decreased PTA gabapentin to 100mg BID--continue reduced dose on discharge     Pyuria  *Patient with urinary frequency, pyuria on urinalysis. Symptoms and findings could potentially be secondary to uncontrolled diabetes as well.  *Urine culture with mixed urogenital luisa.    -Stopped ceftriaxone 2/15.       Hypothyroidism  Questionable early myxedema crisis (see above)  -TSH elevated at 8.11 with low free T4 of 0.88.    - see above with treatment with stress dose steroids, continuing thyroid supplement 112mcg daily    Cold sores/suspected herpes labials  C/o cold sores on upper lip on 2/22.  - Acyclovir 400mg TID for total of 5 days (started on 2/22).      Hypophosphatemia  Hypokalemia  S/p replacement     Depression  *patient no longer taking  Cymbalta.       Relative hypotension: resolved  Hypertension  *presented with elevated bp. After admission, has had low normal bp. This has improved and has been having higher blood pressure with PTA regimen  - PTA amlodipine 10 mg/d continued  - PTA Lisinopril increased to 40 mg/d on 2/22 (previously on 30mg daily)  - will add hydralazine 10mg TID at discharge for better bp control     HLP  -Resumed PTA crestor     Peripheral edema  Venous insufficiency   - resume PTA lasix MWF on 2/20/24     Constipation-improved     Obesity   Body mass index is 41.57 kg/m .  Increase in all-cause morbidity and mortality.   - Follow up with PCP regarding ongoing management.       Daughter, Janet, updated on day of discharge.     Consultations This Hospital Stay   OCCUPATIONAL THERAPY ADULT IP CONSULT  CARE MANAGEMENT / SOCIAL WORK IP CONSULT  PHARMACY LIAISON FOR MEDICATION COVERAGE CONSULT  NEUROLOGY IP CONSULT  VASCULAR ACCESS ADULT IP CONSULT  PHARMACY IP CONSULT  PHARMACY TO DOSE VANCO  INFECTIOUS DISEASES IP CONSULT  PHARMACY IP CONSULT  VASCULAR ACCESS ADULT IP CONSULT  PHYSICAL THERAPY ADULT IP CONSULT  OCCUPATIONAL THERAPY ADULT IP CONSULT    Code Status   Full Code    Time Spent on this Encounter   I, Carley Mendez MD, personally saw the patient today and spent 50 minutes discharging this patient.       Carley Mendez MD  [place] [address] [facility]  [address] [place] [address]  Phone: [phone]  ______________________________________________________________________    Physical Exam   Vital Signs: Temp: 98.2  F (36.8  C) Temp src: Oral BP: (!) 166/76 Pulse: 96   Resp: 16 SpO2: 95 % O2 Device: None (Room air)    Weight: 223 lbs 6.4 oz  General Appearance: Alert, awake and no apparent distress  Respiratory: clear to auscultation bilaterally, no wheezing  Cardiovascular: regular rate and rhythm  GI: soft and non-tender  Skin: warm and dry         Primary Care Physician    Myra Tesfaye    Discharge Orders      Follow-up and recommended labs and tests     Keep your upcoming [facility] appointments;     Appointment: 03/05/2024 12:40 PM with Myra Tesfaye DO   Location: [facility]   Address: [address] [place] [address]   Phone: [phone]    Appointment: 03/11/2024 1:15 PM with Jennei Woods MD   Location: [facility]   Address: [address] [place] [address]   Phone: [phone]     General info for SNF    Length of Stay Estimate: Short Term Care: Estimated # of Days 31-90  Condition at Discharge: Improving  Level of care:skilled   Rehabilitation Potential: Good  Admission H&P remains valid and up-to-date: Yes  Recent Chemotherapy: N/A  Use Nursing Home Standing Orders: Yes     Mantoux instructions    Give two-step Mantoux (PPD) Per Facility Policy Yes     Follow Up and recommended labs and tests    Follow up with assisted physician.  The following labs/tests are recommended: basic metabolic panel and cbc in a week.     Reason for your hospital stay    You were admitted for hyperglycemia and mental status changes.     Glucose monitor nursing POCT    Before meals and at bedtime     Activity - Up with assistive device     Activity - Up with nursing assistance     Physical Therapy Adult Consult    Evaluate and treat as clinically indicated.    Reason:  Physical deconditioning     Occupational Therapy Adult Consult    Evaluate and treat as clinically indicated.    Reason:  physical deconditioning     Diet    Follow this diet upon discharge: Orders Placed This Encounter      Room Service      Combination Diet Moderate Consistent Carb (60 g CHO per Meal) Diet; Low Saturated Fat Na <2400mg Diet       Significant Results and Procedures   Most Recent 3 CBC's:  Recent Labs   Lab Test 02/23/24  0610 02/22/24  0857 02/18/24  0752 02/17/24  0852   WBC  --  11.4* 16.3* 9.9   HGB  --  13.7 14.5  14.0   MCV  --  88 87 88    210 260 202     Most Recent 3 BMP's:  Recent Labs   Lab Test 02/23/24  1202 02/23/24  0744 02/23/24  0610 02/22/24  2106 02/22/24  1820 02/22/24  1209 02/22/24  0857 02/21/24  1730 02/21/24  1532 02/21/24  1149 02/21/24  0828 02/19/24  0811 02/19/24  0659   NA  --   --   --   --   --   --  140  --   --   --  140  --  141   POTASSIUM  --   --   --   --  3.7  --  3.3*  --  3.5  --  3.3*   < > 3.6   CHLORIDE  --   --   --   --   --   --  105  --   --   --  104  --  108*   CO2  --   --   --   --   --   --  28  --   --   --  30*  --  24   BUN  --   --   --   --   --   --  11.2  --   --   --  14.6  --  17.6   CR  --   --  0.67  --   --   --  0.61  --   --   --  0.62  --  0.62   ANIONGAP  --   --   --   --   --   --  7  --   --   --  6*  --  9   KATERIN  --   --   --   --   --   --  8.7*  --   --   --  9.0  --  8.8   * 163*  --  206*  --    < > 124*   < >  --    < > 133*   < > 95    < > = values in this interval not displayed.   ,   Results for orders placed or performed during the hospital encounter of 02/13/24   CT Head w/o Contrast    Narrative    EXAM: CT HEAD W/O CONTRAST  LOCATION: [facility]  DATE: 2/13/2024    INDICATION: increased confusion  COMPARISON: None.  TECHNIQUE: Routine CT Head without IV contrast. Multiplanar reformats. Dose reduction techniques were used.    FINDINGS:  INTRACRANIAL CONTENTS: No intracranial hemorrhage, extraaxial collection, or mass effect.  No CT evidence of acute infarct. Mild presumed chronic small vessel ischemic changes. Normal ventricles and sulci.     VISUALIZED ORBITS/SINUSES/MASTOIDS: No intraorbital abnormality. No paranasal sinus mucosal disease. No middle ear or mastoid effusion.    BONES/SOFT TISSUES: No acute abnormality.      Impression    IMPRESSION:  1.  No acute intracranial process.   CT Head w/o Contrast    Narrative    CT SCAN OF THE HEAD WITHOUT CONTRAST February 16, 2024 11:16 AM     HISTORY: Altered  mental status, increasing lethargy.    TECHNIQUE: Axial images of the head and coronal reformations without  IV contrast material. Radiation dose for this scan was reduced using  automated exposure control, adjustment of the mA and/or kV according  to patient size, or iterative reconstruction technique.    COMPARISON: Head CT 2/13/2024.    FINDINGS: No evidence of hemorrhage, mass, or hydrocephalus.  Generalized parenchymal volume loss with background of nonspecific  white matter hypoattenuation likely represent chronic small vessel  ischemic change. Partially empty sella turcica, presumably incidental.  No acute osseous abnormality.      Impression    IMPRESSION: No acute intracranial abnormality.    AL OCHOA MD         SYSTEM ID:  [id number]   XR Chest Port 1 View    Narrative    XR CHEST PORT 1 VIEW  2/16/2024 12:45 PM       INDICATION: acute mentation changes with slight leukocytosis  COMPARISON: None       Impression    IMPRESSION: Negative chest.    DARRON RIDDLE MD         SYSTEM ID:  [id number]   XR Lumbar Puncture Spinal Tap Diag    Narrative    LUMBAR PUNCTURE WITH FLUOROSCOPIC GUIDANCE 2/16/2024 3:32 PM     HISTORY: encephalopathy    PROCEDURE:  Informed consent was obtained including discussion of  risks, benefits and potential complications of the procedure. The  patient was placed in prone position. The L3-4 interspace was  identified and the overlying skin was marked. The patient was then  prepped, draped and anesthetized using sterile technique. Local  anesthesia was performed using 1% lidocaine.    A 22-gauge 3 1/2 inch spinal needle was inserted under fluoroscopic  guidance into the CSF space with return of clear fluid. 8 mL of clear  cerebrospinal fluid was sent to the laboratory for analysis. The  stylet was reinserted and the spinal needle was removed. There were no  complications.     FLUOROSCOPY TIME: 0.1 minutes.   SPOT IMAGES OR CINE RUNS: 1      Impression    IMPRESSION: Successful  fluoroscopic-guided lumbar puncture.    AL OCHOA MD         SYSTEM ID:  [id number]   MR Brain w/o & w Contrast    Narrative    EXAM: MR BRAIN W/O and W CONTRAST  LOCATION: [facility]  DATE: 2/17/2024    INDICATION: Confusion, slurred speech  COMPARISON:  MRI brain 10/27/2014.  CONTRAST: 9 mL Gadavist  TECHNIQUE: Routine multiplanar multisequence head MRI without and with intravenous contrast.    FINDINGS:  INTRACRANIAL CONTENTS: No acute or subacute infarct. No acute hemorrhage or extra-axial fluid collections. 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 Patchy   nonspecific T2/FLAIR hyperintensities within the cerebral white matter and christopher most consistent with mild to moderate chronic microvascular ischemic change. Mild to moderate generalized cerebral atrophy. No hydrocephalus. Normal position of the   cerebellar tonsils.    SELLA: Empty sella morphology with flattening of the pituitary gland along the sellar floor.    OSSEOUS STRUCTURES/SOFT TISSUES: Normal marrow signal. The major intracranial vascular flow voids are maintained.     ORBITS: Prior bilateral cataract surgery. Visualized portions of the orbits are otherwise unremarkable.     SINUSES/MASTOIDS: No paranasal sinus mucosal disease. Scattered fluid/membrane thickening in the right mastoid air cells. No apparent mass in the posterior nasopharynx or skull base.       Impression    IMPRESSION:  1.  No acute intracranial process.  2.  Generalized brain atrophy and presumed microvascular ischemic changes as detailed above.  3.  Slight increased size of the small left petrous meningioma since 2014.       Discharge Medications   Current Discharge Medication List        START taking these medications    Details   acyclovir (ZOVIRAX) 400 MG tablet Take 1 tablet (400 mg) by mouth 3 times daily for 4 days    Associated Diagnoses: Herpes labialis       hydrALAZINE (APRESOLINE) 10 MG tablet Take 1 tablet (10 mg) by mouth 3 times daily    Associated Diagnoses: Benign essential hypertension      !! insulin lispro (HUMALOG KWIKPEN) 100 UNIT/ML (1 unit dial) KWIKPEN Inject 4 Units Subcutaneous 3 times daily (before meals) Hold for premeal glucose <100    Associated Diagnoses: Type 2 diabetes mellitus with diabetic polyneuropathy, with long-term current use of insulin (H)       !! - Potential duplicate medications found. Please discuss with provider.        CONTINUE these medications which have CHANGED    Details   acetaminophen (TYLENOL) 325 MG tablet Take 2 tablets (650 mg) by mouth every 6 hours as needed for mild pain or other (and adjunct with moderate or severe pain or per patient request)    Associated Diagnoses: Pain      gabapentin (NEURONTIN) 100 MG capsule Take 1 capsule (100 mg) by mouth 2 times daily    Associated Diagnoses: Pain      insulin glargine (LANTUS VIAL) 100 UNIT/ML vial Inject 35 Units Subcutaneous every morning    Associated Diagnoses: Type 2 diabetes mellitus with diabetic polyneuropathy, with long-term current use of insulin (H)      lisinopril (ZESTRIL) 40 MG tablet Take 1 tablet (40 mg) by mouth daily    Associated Diagnoses: Benign essential hypertension           CONTINUE these medications which have NOT CHANGED    Details   amLODIPine (NORVASC) 10 MG tablet Take 10 mg by mouth daily      furosemide (LASIX) 20 MG tablet Take 1 Tablet (20 mg) by mouth every Monday, Wednesday,Friday. In the morning      levalbuterol (XOPENEX HFA) 45 MCG/ACT inhaler Inhale 2 puffs into the lungs every 4 hours as needed      levothyroxine (SYNTHROID/LEVOTHROID) 112 MCG tablet Take 112 mcg by mouth daily      rosuvastatin (CRESTOR) 20 MG tablet Take 20 mg by mouth at bedtime      terbinafine (LAMISIL) 250 MG tablet Take 250 mg by mouth Daily for one week each month      triamcinolone (KENALOG) 0.025 % external ointment Apply topically 2 times daily To  lower legs      !! insulin lispro (HUMALOG KWIKPEN) 100 UNIT/ML (1 unit dial) KWIKPEN Inject insulin three times daily before meals and bedtime:  If Pre-meal Glucose  140 -164 give 1 unit  165 -189 give 2 units  190 -214 give 3 units  215 -239 give 4 units  240 -264 give 5 units  265 -289 give 6 units  290 -314 give 7 units  315 -339 give 8 units  340+ give 9 units    If Bedtime Glucose  200 -214 give 1 unit  215 -264 give 2 units  265 -314 give 3 units  315+ give 4 units  Qty: 15 mL    Associated Diagnoses: Type 2 diabetes mellitus with diabetic polyneuropathy, with long-term current use of insulin (H)       !! - Potential duplicate medications found. Please discuss with provider.        STOP taking these medications       insulin lispro protamine-insulin lispro (HUMALOG MIX 75/25 KWIKPEN) (75-25) 100 UNIT/ML pen Comments:   Reason for Stopping:             Allergies   Allergies   Allergen Reactions    Hydrocodone-Acetaminophen      constipated

## 2024-02-23 NOTE — PLAN OF CARE
Goal Outcome Evaluation:DATE & TIME: 02/23/24,0700- 1400  Cognitive Concerns/ Orientation : A&O x4.Calm, cooperative.   BEHAVIOR & AGGRESSION TOOL COLOR: Green   ABNL VS/O2: VSS on RA - slightly elevated BP  MOBILITY: SBA G/W- offered to walk in hallway, pt refused. Up in chair.  PAIN MANAGMENT: Denies  DIET: Mod Carb, carb counting.   BOWEL/BLADDER: Cont B/B.    ABNL LAB/BG:K 3.7, Phos 2.8 / 193  DRAIN/DEVICES: No PIV   TELEMETRY RHYTHM: n/a  SKIN: Mirza on BLE. Rash on L&R shin- per patient has improved significantly  TESTS/PROCEDURES: none  D/C DATE: plan to discharge TCU  if cortisol levels WDL.   OTHER IMPORTANT INFO: SW following for placement. Neuros intact/unchanged.        Discharge    Patient discharged to TCU via    Care plan note;Done  Listed belongings gathered and given to patient (including from security/pharmacy). yes  Care Plan and Patient education resolved: yes  Prescriptions if needed, hard copies sent with patient  NA  Medication Bin checked and emptied on discharge Yes  SW/care coordinator/charge RN aware of discharge: Yes

## 2024-02-23 NOTE — PROGRESS NOTES
Care Management Discharge Note    Discharge Date: 02/23/2024       Discharge Disposition: Transitional Care at [facility]    Discharge Services:      Discharge DME:      Discharge Transportation: family or friend will provide    Private pay costs discussed: transportation costs    Does the patient's insurance plan have a 3 day qualifying hospital stay waiver?  No    PAS Confirmation Code: [id number]  Patient/family educated on Medicare website which has current facility and service quality ratings:      Education Provided on the Discharge Plan: yes   Persons Notified of Discharge Plans: hospitalist, CTRN,RN,BB  Patient/Family in Agreement with the Plan: yes    Handoff Referral Completed: Yes    Additional Information:  Pt scheduled via [facility] w/c transport to discharge to [facility] today.  In the process of discharging pt , daughter, Janet express serious concern regarding pt's home and living situation.  SW followed up with discussion with daughter and found that Janet had to hire a hazardous waste cleaning service to clean pt's home as it was covered in trash, rotten food and stool from pt having diarrhea prior to being hospitalized. Waste [employer] advised daughter not to enter pt's home before they can clean it.  Matt states that this has been a pattern of self neglect that has gone on for years and pt did stop taking her medications that resulted in hospitalization.  Vulnerable Adult report [id number] filed with [facility].    BERENICE Wasserman

## 2024-02-23 NOTE — PLAN OF CARE
Summary: Presented with hyperglycemia, pyuria , HTN, and encephalopathy  DATE & TIME: 02/22/24, [id number]  Cognitive Concerns/ Orientation : A&O x4.Calm , cooperative.   BEHAVIOR & AGGRESSION TOOL COLOR: Green   ABNL VS/O2: VSS on RA - slightly elevated BP  MOBILITY: SBA G/W- offered to walk in hallway, pt refused. Up in chair entire shift  PAIN MANAGMENT: Denies  DIET: Mod Carb, carb counting.   BOWEL/BLADDER: Cont B/B, had a Bm last evening, refused HS senna.   ABNL LAB/BG:K 3.7, Phos 2.3 (replaced, recheck in AM).  (at HS)   DRAIN/DEVICES: No PIV - MD okay with this  TELEMETRY RHYTHM: n/a  SKIN: Mirza on BLE. Rash on L&R shin- per patient has improved significantly  TESTS/PROCEDURES: none  D/C DATE: Awaiting TCU placement. Possibly tomorrow if cortisol levels WDL.   OTHER IMPORTANT INFO: SW following for placement. Neuros intact/unchanged.

## 2024-02-23 NOTE — PROGRESS NOTES
[id number] 2/22/24    Summary:hyperglycemia, pyuria , HTN, and encephalopathy     Orientation:A&Ox4    Vitals/Tele:VSS on RA    IV Access/drains:None    Diet:Mod Carb    Mobility:SBA /gait belt walker,ambulated to  this shift    GI/:Continent of B/B no B/M this shift    Wound/Skin:Rash on L & R shin    Consults:SW/OT    Discharge Plan:Pending TCU.

## 2024-02-24 LAB
EEEV IGG TITR SER IF: NORMAL {TITER}
EEEV IGM TITR SER IF: NORMAL {TITER}
LACV IGG TITR SER IF: NORMAL {TITER}
LACV IGM TITR SER IF: NORMAL {TITER}
SLEV IGG TITR SER IF: NORMAL {TITER}
SLEV IGM TITR SER IF: NORMAL {TITER}
WEEV IGG TITR SER IF: NORMAL {TITER}
WEEV IGM TITR SER IF: NORMAL {TITER}
WNV IGG SER IA-ACNC: 0.45 IV
WNV IGM SER IA-ACNC: 0.01 IV

## 2024-02-26 NOTE — PLAN OF CARE
Occupational Therapy Discharge Summary    Reason for therapy discharge:    Discharged to transitional care facility.    Progress towards therapy goal(s). See goals on Care Plan in [facility] electronic health record for goal details.  Goals partially met.  Barriers to achieving goals:   discharge from facility.    Therapy recommendation(s):    Continued therapy is recommended.  Rationale/Recommendations:  to increase independence with functional mobility and self cares.

## (undated) RX ORDER — DEXAMETHASONE SODIUM PHOSPHATE 10 MG/ML
INJECTION, SOLUTION INTRAMUSCULAR; INTRAVENOUS
Status: DISPENSED
Start: 2023-02-09

## (undated) RX ORDER — ACETAMINOPHEN 325 MG/1
TABLET ORAL
Status: DISPENSED
Start: 2023-01-11

## (undated) RX ORDER — LIDOCAINE HYDROCHLORIDE 10 MG/ML
INJECTION, SOLUTION EPIDURAL; INFILTRATION; INTRACAUDAL; PERINEURAL
Status: DISPENSED
Start: 2024-02-16

## (undated) RX ORDER — FENTANYL CITRATE 50 UG/ML
INJECTION, SOLUTION INTRAMUSCULAR; INTRAVENOUS
Status: DISPENSED
Start: 2023-01-12

## (undated) RX ORDER — BETAMETHASONE SODIUM PHOSPHATE AND BETAMETHASONE ACETATE 3; 3 MG/ML; MG/ML
INJECTION, SUSPENSION INTRA-ARTICULAR; INTRALESIONAL; INTRAMUSCULAR; SOFT TISSUE
Status: DISPENSED
Start: 2023-01-12

## (undated) RX ORDER — LIDOCAINE HYDROCHLORIDE 10 MG/ML
INJECTION, SOLUTION EPIDURAL; INFILTRATION; INTRACAUDAL; PERINEURAL
Status: DISPENSED
Start: 2023-03-10

## (undated) RX ORDER — LIDOCAINE HYDROCHLORIDE 10 MG/ML
INJECTION, SOLUTION EPIDURAL; INFILTRATION; INTRACAUDAL; PERINEURAL
Status: DISPENSED
Start: 2023-02-09

## (undated) RX ORDER — DEXAMETHASONE SODIUM PHOSPHATE 10 MG/ML
INJECTION, SOLUTION INTRAMUSCULAR; INTRAVENOUS
Status: DISPENSED
Start: 2023-03-10

## (undated) RX ORDER — LIDOCAINE HYDROCHLORIDE 10 MG/ML
INJECTION, SOLUTION EPIDURAL; INFILTRATION; INTRACAUDAL; PERINEURAL
Status: DISPENSED
Start: 2023-01-12

## (undated) RX ORDER — DEXAMETHASONE SODIUM PHOSPHATE 10 MG/ML
INJECTION, SOLUTION INTRAMUSCULAR; INTRAVENOUS
Status: DISPENSED
Start: 2023-01-12

## (undated) RX ORDER — FLUMAZENIL 0.1 MG/ML
INJECTION, SOLUTION INTRAVENOUS
Status: DISPENSED
Start: 2023-01-12

## (undated) RX ORDER — FENTANYL CITRATE 0.05 MG/ML
INJECTION, SOLUTION INTRAMUSCULAR; INTRAVENOUS
Status: DISPENSED
Start: 2023-01-11

## (undated) RX ORDER — NALOXONE HYDROCHLORIDE 0.4 MG/ML
INJECTION, SOLUTION INTRAMUSCULAR; INTRAVENOUS; SUBCUTANEOUS
Status: DISPENSED
Start: 2023-01-12